# Patient Record
Sex: FEMALE | Race: WHITE | Employment: UNEMPLOYED | URBAN - NONMETROPOLITAN AREA
[De-identification: names, ages, dates, MRNs, and addresses within clinical notes are randomized per-mention and may not be internally consistent; named-entity substitution may affect disease eponyms.]

---

## 2017-09-12 ENCOUNTER — HOSPITAL ENCOUNTER (INPATIENT)
Age: 47
LOS: 5 days | Discharge: HOME | DRG: 291 | End: 2017-09-17
Attending: EMERGENCY MEDICINE | Admitting: INTERNAL MEDICINE

## 2017-09-12 ENCOUNTER — APPOINTMENT (OUTPATIENT)
Dept: GENERAL RADIOLOGY | Age: 47
DRG: 291 | End: 2017-09-12

## 2017-09-12 DIAGNOSIS — R60.0 PEDAL EDEMA: ICD-10-CM

## 2017-09-12 DIAGNOSIS — E11.65 TYPE 2 DIABETES MELLITUS WITH HYPERGLYCEMIA, UNSPECIFIED LONG TERM INSULIN USE STATUS: ICD-10-CM

## 2017-09-12 DIAGNOSIS — J18.9 PNEUMONIA DUE TO ORGANISM: ICD-10-CM

## 2017-09-12 DIAGNOSIS — I50.9 CONGESTIVE HEART FAILURE, UNSPECIFIED CONGESTIVE HEART FAILURE CHRONICITY, UNSPECIFIED CONGESTIVE HEART FAILURE TYPE: Primary | ICD-10-CM

## 2017-09-12 DIAGNOSIS — E87.3 METABOLIC ALKALOSIS: ICD-10-CM

## 2017-09-12 LAB
ALBUMIN SERPL-MCNC: 2.8 G/DL (ref 3.5–5.2)
ALP BLD-CCNC: 441 U/L (ref 35–104)
ALT SERPL-CCNC: 54 U/L (ref 5–33)
ANION GAP SERPL CALCULATED.3IONS-SCNC: 13 MMOL/L (ref 7–19)
AST SERPL-CCNC: 36 U/L (ref 5–32)
BACTERIA: NEGATIVE /HPF
BASE EXCESS ARTERIAL: 5 MMOL/L (ref -2–2)
BASOPHILS ABSOLUTE: 0.1 K/UL (ref 0–0.2)
BASOPHILS RELATIVE PERCENT: 0.7 % (ref 0–1)
BILIRUB SERPL-MCNC: 0.4 MG/DL (ref 0.2–1.2)
BILIRUBIN URINE: NEGATIVE
BLOOD, URINE: ABNORMAL
BUN BLDV-MCNC: 9 MG/DL (ref 6–20)
CALCIUM SERPL-MCNC: 8.8 MG/DL (ref 8.6–10)
CARBOXYHEMOGLOBIN ARTERIAL: 3.7 % (ref 0–5)
CHLORIDE BLD-SCNC: 95 MMOL/L (ref 98–111)
CLARITY: CLEAR
CO2: 26 MMOL/L (ref 22–29)
COLOR: YELLOW
CREAT SERPL-MCNC: 0.5 MG/DL (ref 0.5–0.9)
EOSINOPHILS ABSOLUTE: 0.1 K/UL (ref 0–0.6)
EOSINOPHILS RELATIVE PERCENT: 0.7 % (ref 0–5)
EPITHELIAL CELLS, UA: 1 /HPF (ref 0–5)
GFR NON-AFRICAN AMERICAN: >60
GLUCOSE BLD-MCNC: 333 MG/DL (ref 74–109)
GLUCOSE URINE: >=1000 MG/DL
HCO3 ARTERIAL: 27.9 MMOL/L (ref 22–26)
HCT VFR BLD CALC: 46.7 % (ref 37–47)
HEMOGLOBIN, ART, EXTENDED: 15.4 G/DL (ref 12–16)
HEMOGLOBIN: 15.2 G/DL (ref 12–16)
HYALINE CASTS: 0 /HPF (ref 0–8)
INR BLD: 1.01 (ref 0.88–1.18)
KETONES, URINE: NEGATIVE MG/DL
LACTIC ACID: 1.6 MG/DL (ref 0.5–1.9)
LEUKOCYTE ESTERASE, URINE: NEGATIVE
LIPASE: 20 U/L (ref 13–60)
LYMPHOCYTES ABSOLUTE: 2.1 K/UL (ref 1.1–4.5)
LYMPHOCYTES RELATIVE PERCENT: 20.6 % (ref 20–40)
MCH RBC QN AUTO: 27.5 PG (ref 27–31)
MCHC RBC AUTO-ENTMCNC: 32.5 G/DL (ref 33–37)
MCV RBC AUTO: 84.6 FL (ref 81–99)
METHEMOGLOBIN ARTERIAL: 1 %
MONOCYTES ABSOLUTE: 1 K/UL (ref 0–0.9)
MONOCYTES RELATIVE PERCENT: 9.5 % (ref 0–10)
NEUTROPHILS ABSOLUTE: 6.9 K/UL (ref 1.5–7.5)
NEUTROPHILS RELATIVE PERCENT: 68 % (ref 50–65)
NITRITE, URINE: NEGATIVE
O2 CONTENT ARTERIAL: 20.2 ML/DL
O2 SAT, ARTERIAL: 93.2 %
O2 THERAPY: ABNORMAL
PCO2 ARTERIAL: 35 MMHG (ref 35–45)
PDW BLD-RTO: 14.4 % (ref 11.5–14.5)
PERFORMED ON: NORMAL
PH ARTERIAL: 7.51 (ref 7.35–7.45)
PH UA: 7.5
PLATELET # BLD: 378 K/UL (ref 130–400)
PMV BLD AUTO: 9.4 FL (ref 9.4–12.3)
PO2 ARTERIAL: 81 MMHG (ref 80–100)
POC TROPONIN I: 0.01 NG/ML (ref 0–0.08)
POTASSIUM SERPL-SCNC: 4 MMOL/L (ref 3.5–5)
POTASSIUM, WHOLE BLOOD: 3.8
PRO-BNP: 5489 PG/ML (ref 0–450)
PROTEIN UA: 30 MG/DL
PROTHROMBIN TIME: 13.2 SEC (ref 12–14.6)
RBC # BLD: 5.52 M/UL (ref 4.2–5.4)
RBC UA: 10 /HPF (ref 0–4)
SODIUM BLD-SCNC: 134 MMOL/L (ref 136–145)
SPECIFIC GRAVITY UA: 1.01
TOTAL PROTEIN: 7.2 G/DL (ref 6.6–8.7)
TSH SERPL DL<=0.05 MIU/L-ACNC: 2.88 UIU/ML (ref 0.27–4.2)
UROBILINOGEN, URINE: 1 E.U./DL
WBC # BLD: 10.2 K/UL (ref 4.8–10.8)
WBC UA: 0 /HPF (ref 0–5)

## 2017-09-12 PROCEDURE — 99284 EMERGENCY DEPT VISIT MOD MDM: CPT | Performed by: EMERGENCY MEDICINE

## 2017-09-12 PROCEDURE — 82803 BLOOD GASES ANY COMBINATION: CPT

## 2017-09-12 PROCEDURE — 36415 COLL VENOUS BLD VENIPUNCTURE: CPT

## 2017-09-12 PROCEDURE — 99285 EMERGENCY DEPT VISIT HI MDM: CPT

## 2017-09-12 PROCEDURE — 85025 COMPLETE CBC W/AUTO DIFF WBC: CPT

## 2017-09-12 PROCEDURE — 80053 COMPREHEN METABOLIC PANEL: CPT

## 2017-09-12 PROCEDURE — 84443 ASSAY THYROID STIM HORMONE: CPT

## 2017-09-12 PROCEDURE — 83690 ASSAY OF LIPASE: CPT

## 2017-09-12 PROCEDURE — 81001 URINALYSIS AUTO W/SCOPE: CPT

## 2017-09-12 PROCEDURE — 2140000000 HC CCU INTERMEDIATE R&B

## 2017-09-12 PROCEDURE — 93005 ELECTROCARDIOGRAM TRACING: CPT

## 2017-09-12 PROCEDURE — 83605 ASSAY OF LACTIC ACID: CPT

## 2017-09-12 PROCEDURE — 71020 XR CHEST STANDARD TWO VW: CPT

## 2017-09-12 PROCEDURE — 36600 WITHDRAWAL OF ARTERIAL BLOOD: CPT

## 2017-09-12 PROCEDURE — 84132 ASSAY OF SERUM POTASSIUM: CPT

## 2017-09-12 PROCEDURE — 6360000002 HC RX W HCPCS: Performed by: EMERGENCY MEDICINE

## 2017-09-12 PROCEDURE — 87040 BLOOD CULTURE FOR BACTERIA: CPT

## 2017-09-12 PROCEDURE — 85610 PROTHROMBIN TIME: CPT

## 2017-09-12 PROCEDURE — 84484 ASSAY OF TROPONIN QUANT: CPT

## 2017-09-12 PROCEDURE — 83880 ASSAY OF NATRIURETIC PEPTIDE: CPT

## 2017-09-12 RX ORDER — FUROSEMIDE 10 MG/ML
80 INJECTION INTRAMUSCULAR; INTRAVENOUS ONCE
Status: COMPLETED | OUTPATIENT
Start: 2017-09-12 | End: 2017-09-12

## 2017-09-12 RX ORDER — FUROSEMIDE 40 MG/1
40 TABLET ORAL 2 TIMES DAILY
Status: ON HOLD | COMMUNITY
End: 2017-09-17 | Stop reason: HOSPADM

## 2017-09-12 RX ORDER — POTASSIUM CHLORIDE 1.5 G/1.77G
20 POWDER, FOR SOLUTION ORAL 2 TIMES DAILY
Status: ON HOLD | COMMUNITY
End: 2017-09-17

## 2017-09-12 RX ORDER — ENALAPRIL MALEATE 20 MG/1
20 TABLET ORAL 2 TIMES DAILY
COMMUNITY

## 2017-09-12 RX ORDER — SERTRALINE HYDROCHLORIDE 100 MG/1
100 TABLET, FILM COATED ORAL DAILY
COMMUNITY

## 2017-09-12 RX ORDER — SUCRALFATE 1 G/1
1 TABLET ORAL 4 TIMES DAILY
COMMUNITY

## 2017-09-12 RX ORDER — AMLODIPINE BESYLATE 5 MG/1
5 TABLET ORAL DAILY
Status: ON HOLD | COMMUNITY
End: 2017-09-17

## 2017-09-12 RX ORDER — ASPIRIN 81 MG/1
81 TABLET, CHEWABLE ORAL DAILY
COMMUNITY

## 2017-09-12 RX ORDER — LEVOTHYROXINE SODIUM 0.15 MG/1
150 TABLET ORAL DAILY
COMMUNITY

## 2017-09-12 RX ORDER — HYDROCHLOROTHIAZIDE 25 MG/1
25 TABLET ORAL DAILY
Status: ON HOLD | COMMUNITY
End: 2017-09-17 | Stop reason: HOSPADM

## 2017-09-12 RX ORDER — OMEPRAZOLE 20 MG/1
20 CAPSULE, DELAYED RELEASE ORAL 2 TIMES DAILY
COMMUNITY

## 2017-09-12 RX ORDER — METOPROLOL TARTRATE 100 MG/1
100 TABLET ORAL 2 TIMES DAILY
Status: ON HOLD | COMMUNITY
End: 2017-09-17

## 2017-09-12 RX ADMIN — TAZOBACTAM SODIUM AND PIPERACILLIN SODIUM 3.38 G: 375; 3 INJECTION, SOLUTION INTRAVENOUS at 22:19

## 2017-09-12 ASSESSMENT — ENCOUNTER SYMPTOMS
SHORTNESS OF BREATH: 1
VOMITING: 0
NAUSEA: 0
BACK PAIN: 0
DIARRHEA: 0
CHOKING: 0
COUGH: 0
ABDOMINAL PAIN: 1

## 2017-09-13 ENCOUNTER — APPOINTMENT (OUTPATIENT)
Dept: ULTRASOUND IMAGING | Age: 47
DRG: 291 | End: 2017-09-13

## 2017-09-13 PROBLEM — R60.1 GENERALIZED EDEMA: Status: ACTIVE | Noted: 2017-09-13

## 2017-09-13 PROBLEM — R74.01 TRANSAMINITIS: Status: ACTIVE | Noted: 2017-09-13

## 2017-09-13 PROBLEM — E88.09 HYPOALBUMINEMIA: Status: ACTIVE | Noted: 2017-09-13

## 2017-09-13 PROBLEM — J18.9 PNEUMONIA OF RIGHT LUNG DUE TO INFECTIOUS ORGANISM: Status: ACTIVE | Noted: 2017-09-13

## 2017-09-13 PROBLEM — I10 ESSENTIAL HYPERTENSION: Status: ACTIVE | Noted: 2017-09-13

## 2017-09-13 PROBLEM — K76.89 HEPATOCELLULAR DYSFUNCTION: Status: ACTIVE | Noted: 2017-09-13

## 2017-09-13 LAB
ANION GAP SERPL CALCULATED.3IONS-SCNC: 11 MMOL/L (ref 7–19)
BUN BLDV-MCNC: 7 MG/DL (ref 6–20)
CALCIUM SERPL-MCNC: 8.4 MG/DL (ref 8.6–10)
CHLORIDE BLD-SCNC: 94 MMOL/L (ref 98–111)
CO2: 30 MMOL/L (ref 22–29)
CREAT SERPL-MCNC: 0.4 MG/DL (ref 0.5–0.9)
GFR NON-AFRICAN AMERICAN: >60
GLUCOSE BLD-MCNC: 189 MG/DL (ref 70–99)
GLUCOSE BLD-MCNC: 262 MG/DL (ref 70–99)
GLUCOSE BLD-MCNC: 298 MG/DL (ref 70–99)
GLUCOSE BLD-MCNC: 341 MG/DL (ref 70–99)
GLUCOSE BLD-MCNC: 361 MG/DL (ref 74–109)
LV EF: 23 %
LVEF MODALITY: NORMAL
MAGNESIUM: 1.6 MG/DL (ref 1.6–2.6)
PERFORMED ON: ABNORMAL
POTASSIUM SERPL-SCNC: 3.6 MMOL/L (ref 3.5–5)
SODIUM BLD-SCNC: 135 MMOL/L (ref 136–145)
TROPONIN: <0.01 NG/ML (ref 0–0.03)
TROPONIN: <0.01 NG/ML (ref 0–0.03)
TSH SERPL DL<=0.05 MIU/L-ACNC: 2.44 UIU/ML (ref 0.27–4.2)

## 2017-09-13 PROCEDURE — 2140000000 HC CCU INTERMEDIATE R&B

## 2017-09-13 PROCEDURE — 93306 TTE W/DOPPLER COMPLETE: CPT

## 2017-09-13 PROCEDURE — 76705 ECHO EXAM OF ABDOMEN: CPT

## 2017-09-13 PROCEDURE — 84145 PROCALCITONIN (PCT): CPT

## 2017-09-13 PROCEDURE — 83735 ASSAY OF MAGNESIUM: CPT

## 2017-09-13 PROCEDURE — 82948 REAGENT STRIP/BLOOD GLUCOSE: CPT

## 2017-09-13 PROCEDURE — 84484 ASSAY OF TROPONIN QUANT: CPT

## 2017-09-13 PROCEDURE — 83036 HEMOGLOBIN GLYCOSYLATED A1C: CPT

## 2017-09-13 PROCEDURE — 84443 ASSAY THYROID STIM HORMONE: CPT

## 2017-09-13 PROCEDURE — 99223 1ST HOSP IP/OBS HIGH 75: CPT | Performed by: INTERNAL MEDICINE

## 2017-09-13 PROCEDURE — 80048 BASIC METABOLIC PNL TOTAL CA: CPT

## 2017-09-13 PROCEDURE — 6370000000 HC RX 637 (ALT 250 FOR IP): Performed by: INTERNAL MEDICINE

## 2017-09-13 PROCEDURE — 6360000002 HC RX W HCPCS: Performed by: INTERNAL MEDICINE

## 2017-09-13 PROCEDURE — 36415 COLL VENOUS BLD VENIPUNCTURE: CPT

## 2017-09-13 PROCEDURE — 99232 SBSQ HOSP IP/OBS MODERATE 35: CPT | Performed by: INTERNAL MEDICINE

## 2017-09-13 PROCEDURE — 2580000003 HC RX 258: Performed by: INTERNAL MEDICINE

## 2017-09-13 RX ORDER — ASPIRIN 81 MG/1
81 TABLET ORAL DAILY
Status: DISCONTINUED | OUTPATIENT
Start: 2017-09-13 | End: 2017-09-17 | Stop reason: HOSPADM

## 2017-09-13 RX ORDER — POTASSIUM CHLORIDE 20 MEQ/1
40 TABLET, EXTENDED RELEASE ORAL PRN
Status: DISCONTINUED | OUTPATIENT
Start: 2017-09-13 | End: 2017-09-17 | Stop reason: HOSPADM

## 2017-09-13 RX ORDER — SERTRALINE HYDROCHLORIDE 100 MG/1
100 TABLET, FILM COATED ORAL DAILY
Status: DISCONTINUED | OUTPATIENT
Start: 2017-09-13 | End: 2017-09-17 | Stop reason: HOSPADM

## 2017-09-13 RX ORDER — OMEPRAZOLE 20 MG/1
20 CAPSULE, DELAYED RELEASE ORAL
Status: DISCONTINUED | OUTPATIENT
Start: 2017-09-13 | End: 2017-09-17 | Stop reason: HOSPADM

## 2017-09-13 RX ORDER — MAGNESIUM SULFATE 1 G/100ML
1 INJECTION INTRAVENOUS PRN
Status: DISCONTINUED | OUTPATIENT
Start: 2017-09-13 | End: 2017-09-17 | Stop reason: HOSPADM

## 2017-09-13 RX ORDER — SODIUM CHLORIDE 0.9 % (FLUSH) 0.9 %
10 SYRINGE (ML) INJECTION PRN
Status: DISCONTINUED | OUTPATIENT
Start: 2017-09-13 | End: 2017-09-17 | Stop reason: HOSPADM

## 2017-09-13 RX ORDER — PANTOPRAZOLE SODIUM 40 MG/1
40 TABLET, DELAYED RELEASE ORAL
Status: DISCONTINUED | OUTPATIENT
Start: 2017-09-13 | End: 2017-09-13

## 2017-09-13 RX ORDER — SODIUM CHLORIDE 0.9 % (FLUSH) 0.9 %
10 SYRINGE (ML) INJECTION EVERY 12 HOURS SCHEDULED
Status: DISCONTINUED | OUTPATIENT
Start: 2017-09-13 | End: 2017-09-17 | Stop reason: HOSPADM

## 2017-09-13 RX ORDER — ACETAMINOPHEN 325 MG/1
650 TABLET ORAL EVERY 4 HOURS PRN
Status: DISCONTINUED | OUTPATIENT
Start: 2017-09-13 | End: 2017-09-17 | Stop reason: HOSPADM

## 2017-09-13 RX ORDER — ONDANSETRON 2 MG/ML
4 INJECTION INTRAMUSCULAR; INTRAVENOUS EVERY 6 HOURS PRN
Status: DISCONTINUED | OUTPATIENT
Start: 2017-09-13 | End: 2017-09-17 | Stop reason: HOSPADM

## 2017-09-13 RX ORDER — POTASSIUM CHLORIDE 20MEQ/15ML
40 LIQUID (ML) ORAL PRN
Status: DISCONTINUED | OUTPATIENT
Start: 2017-09-13 | End: 2017-09-17 | Stop reason: HOSPADM

## 2017-09-13 RX ORDER — DEXTROSE MONOHYDRATE 25 G/50ML
12.5 INJECTION, SOLUTION INTRAVENOUS PRN
Status: DISCONTINUED | OUTPATIENT
Start: 2017-09-13 | End: 2017-09-17 | Stop reason: HOSPADM

## 2017-09-13 RX ORDER — SUCRALFATE 1 G/1
1 TABLET ORAL 4 TIMES DAILY
Status: DISCONTINUED | OUTPATIENT
Start: 2017-09-13 | End: 2017-09-17 | Stop reason: HOSPADM

## 2017-09-13 RX ORDER — FUROSEMIDE 10 MG/ML
40 INJECTION INTRAMUSCULAR; INTRAVENOUS DAILY
Status: DISCONTINUED | OUTPATIENT
Start: 2017-09-13 | End: 2017-09-16

## 2017-09-13 RX ORDER — LEVOTHYROXINE SODIUM 0.15 MG/1
150 TABLET ORAL DAILY
Status: DISCONTINUED | OUTPATIENT
Start: 2017-09-13 | End: 2017-09-17 | Stop reason: HOSPADM

## 2017-09-13 RX ORDER — NICOTINE POLACRILEX 4 MG
15 LOZENGE BUCCAL PRN
Status: DISCONTINUED | OUTPATIENT
Start: 2017-09-13 | End: 2017-09-17 | Stop reason: HOSPADM

## 2017-09-13 RX ORDER — AMLODIPINE BESYLATE 5 MG/1
5 TABLET ORAL DAILY
Status: DISCONTINUED | OUTPATIENT
Start: 2017-09-13 | End: 2017-09-17

## 2017-09-13 RX ORDER — POTASSIUM CHLORIDE 7.45 MG/ML
10 INJECTION INTRAVENOUS PRN
Status: DISCONTINUED | OUTPATIENT
Start: 2017-09-13 | End: 2017-09-17 | Stop reason: HOSPADM

## 2017-09-13 RX ORDER — METOPROLOL TARTRATE 100 MG/1
100 TABLET ORAL 2 TIMES DAILY
Status: DISCONTINUED | OUTPATIENT
Start: 2017-09-13 | End: 2017-09-14

## 2017-09-13 RX ORDER — DEXTROSE MONOHYDRATE 50 MG/ML
100 INJECTION, SOLUTION INTRAVENOUS PRN
Status: DISCONTINUED | OUTPATIENT
Start: 2017-09-13 | End: 2017-09-17 | Stop reason: HOSPADM

## 2017-09-13 RX ORDER — ENALAPRIL MALEATE 20 MG/1
20 TABLET ORAL 2 TIMES DAILY
Status: DISCONTINUED | OUTPATIENT
Start: 2017-09-13 | End: 2017-09-17 | Stop reason: HOSPADM

## 2017-09-13 RX ADMIN — AMLODIPINE BESYLATE 5 MG: 5 TABLET ORAL at 09:27

## 2017-09-13 RX ADMIN — ACETAMINOPHEN 650 MG: 325 TABLET, FILM COATED ORAL at 06:27

## 2017-09-13 RX ADMIN — METOPROLOL TARTRATE 100 MG: 100 TABLET ORAL at 09:25

## 2017-09-13 RX ADMIN — ENALAPRIL MALEATE 20 MG: 20 TABLET ORAL at 20:45

## 2017-09-13 RX ADMIN — INSULIN LISPRO 3 UNITS: 100 INJECTION, SOLUTION INTRAVENOUS; SUBCUTANEOUS at 21:58

## 2017-09-13 RX ADMIN — LEVOTHYROXINE SODIUM 150 MCG: 0.15 TABLET ORAL at 06:27

## 2017-09-13 RX ADMIN — SUCRALFATE 1 G: 1 TABLET ORAL at 09:26

## 2017-09-13 RX ADMIN — FUROSEMIDE 40 MG: 10 INJECTION, SOLUTION INTRAMUSCULAR; INTRAVENOUS at 09:28

## 2017-09-13 RX ADMIN — TAZOBACTAM SODIUM AND PIPERACILLIN SODIUM 3.38 G: 375; 3 INJECTION, SOLUTION INTRAVENOUS at 17:58

## 2017-09-13 RX ADMIN — ACETAMINOPHEN 650 MG: 325 TABLET, FILM COATED ORAL at 20:44

## 2017-09-13 RX ADMIN — Medication 10 ML: at 09:28

## 2017-09-13 RX ADMIN — TAZOBACTAM SODIUM AND PIPERACILLIN SODIUM 3.38 G: 375; 3 INJECTION, SOLUTION INTRAVENOUS at 09:27

## 2017-09-13 RX ADMIN — ASPIRIN 81 MG: 81 TABLET ORAL at 09:26

## 2017-09-13 RX ADMIN — INSULIN LISPRO 8 UNITS: 100 INJECTION, SOLUTION INTRAVENOUS; SUBCUTANEOUS at 09:29

## 2017-09-13 RX ADMIN — SERTRALINE HYDROCHLORIDE 100 MG: 100 TABLET, FILM COATED ORAL at 09:27

## 2017-09-13 RX ADMIN — SUCRALFATE 1 G: 1 TABLET ORAL at 12:51

## 2017-09-13 RX ADMIN — OMEPRAZOLE 20 MG: 20 CAPSULE, DELAYED RELEASE ORAL at 17:58

## 2017-09-13 RX ADMIN — METOPROLOL TARTRATE 100 MG: 100 TABLET ORAL at 20:45

## 2017-09-13 RX ADMIN — ENALAPRIL MALEATE 20 MG: 20 TABLET ORAL at 09:26

## 2017-09-13 RX ADMIN — INSULIN LISPRO 25 UNITS: 100 INJECTION, SUSPENSION SUBCUTANEOUS at 20:41

## 2017-09-13 RX ADMIN — INSULIN LISPRO 2 UNITS: 100 INJECTION, SOLUTION INTRAVENOUS; SUBCUTANEOUS at 17:59

## 2017-09-13 RX ADMIN — Medication 10 ML: at 20:48

## 2017-09-13 RX ADMIN — SUCRALFATE 1 G: 1 TABLET ORAL at 17:59

## 2017-09-13 RX ADMIN — SUCRALFATE 1 G: 1 TABLET ORAL at 20:45

## 2017-09-13 RX ADMIN — ENOXAPARIN SODIUM 40 MG: 40 INJECTION SUBCUTANEOUS at 09:27

## 2017-09-13 RX ADMIN — INSULIN LISPRO 6 UNITS: 100 INJECTION, SOLUTION INTRAVENOUS; SUBCUTANEOUS at 12:51

## 2017-09-13 RX ADMIN — OMEPRAZOLE 20 MG: 20 CAPSULE, DELAYED RELEASE ORAL at 06:27

## 2017-09-13 ASSESSMENT — PAIN SCALES - GENERAL
PAINLEVEL_OUTOF10: 0
PAINLEVEL_OUTOF10: 0
PAINLEVEL_OUTOF10: 5
PAINLEVEL_OUTOF10: 0
PAINLEVEL_OUTOF10: 5
PAINLEVEL_OUTOF10: 0

## 2017-09-13 ASSESSMENT — ENCOUNTER SYMPTOMS
ORTHOPNEA: 1
ABDOMINAL PAIN: 0
DIARRHEA: 0
VOMITING: 0
PHOTOPHOBIA: 0
BACK PAIN: 0
BLURRED VISION: 0
NAUSEA: 0
EYE PAIN: 0
HEARTBURN: 0
DOUBLE VISION: 0
SHORTNESS OF BREATH: 1

## 2017-09-14 PROBLEM — I50.23 ACUTE ON CHRONIC SYSTOLIC CONGESTIVE HEART FAILURE (HCC): Status: ACTIVE | Noted: 2017-09-14

## 2017-09-14 LAB
ANION GAP SERPL CALCULATED.3IONS-SCNC: 11 MMOL/L (ref 7–19)
BUN BLDV-MCNC: 12 MG/DL (ref 6–20)
CALCIUM SERPL-MCNC: 8.5 MG/DL (ref 8.6–10)
CHLORIDE BLD-SCNC: 100 MMOL/L (ref 98–111)
CO2: 31 MMOL/L (ref 22–29)
CREAT SERPL-MCNC: 0.5 MG/DL (ref 0.5–0.9)
GFR NON-AFRICAN AMERICAN: >60
GLUCOSE BLD-MCNC: 108 MG/DL (ref 70–99)
GLUCOSE BLD-MCNC: 142 MG/DL (ref 70–99)
GLUCOSE BLD-MCNC: 160 MG/DL (ref 74–109)
GLUCOSE BLD-MCNC: 182 MG/DL (ref 70–99)
GLUCOSE BLD-MCNC: 242 MG/DL (ref 70–99)
PERFORMED ON: ABNORMAL
POTASSIUM SERPL-SCNC: 3.7 MMOL/L (ref 3.5–5)
PROCALCITONIN: <0.07 NG/ML
PROCALCITONIN: <0.07 NG/ML
SODIUM BLD-SCNC: 142 MMOL/L (ref 136–145)

## 2017-09-14 PROCEDURE — 82948 REAGENT STRIP/BLOOD GLUCOSE: CPT

## 2017-09-14 PROCEDURE — 6360000002 HC RX W HCPCS: Performed by: INTERNAL MEDICINE

## 2017-09-14 PROCEDURE — 80048 BASIC METABOLIC PNL TOTAL CA: CPT

## 2017-09-14 PROCEDURE — 36415 COLL VENOUS BLD VENIPUNCTURE: CPT

## 2017-09-14 PROCEDURE — 2580000003 HC RX 258: Performed by: INTERNAL MEDICINE

## 2017-09-14 PROCEDURE — 2140000000 HC CCU INTERMEDIATE R&B

## 2017-09-14 PROCEDURE — 2500000003 HC RX 250 WO HCPCS: Performed by: INTERNAL MEDICINE

## 2017-09-14 PROCEDURE — 99232 SBSQ HOSP IP/OBS MODERATE 35: CPT | Performed by: INTERNAL MEDICINE

## 2017-09-14 PROCEDURE — 6370000000 HC RX 637 (ALT 250 FOR IP): Performed by: INTERNAL MEDICINE

## 2017-09-14 RX ORDER — METOPROLOL TARTRATE 100 MG/1
50 TABLET ORAL 2 TIMES DAILY
Status: DISCONTINUED | OUTPATIENT
Start: 2017-09-14 | End: 2017-09-15

## 2017-09-14 RX ORDER — METOPROLOL TARTRATE 50 MG/1
50 TABLET, FILM COATED ORAL 2 TIMES DAILY
Status: DISCONTINUED | OUTPATIENT
Start: 2017-09-14 | End: 2017-09-14

## 2017-09-14 RX ADMIN — TAZOBACTAM SODIUM AND PIPERACILLIN SODIUM 3.38 G: 375; 3 INJECTION, SOLUTION INTRAVENOUS at 01:59

## 2017-09-14 RX ADMIN — SUCRALFATE 1 G: 1 TABLET ORAL at 17:25

## 2017-09-14 RX ADMIN — INSULIN LISPRO 25 UNITS: 100 INJECTION, SUSPENSION SUBCUTANEOUS at 08:19

## 2017-09-14 RX ADMIN — METOPROLOL TARTRATE 50 MG: 100 TABLET ORAL at 22:43

## 2017-09-14 RX ADMIN — METOPROLOL TARTRATE 100 MG: 100 TABLET ORAL at 08:19

## 2017-09-14 RX ADMIN — ENOXAPARIN SODIUM 40 MG: 40 INJECTION SUBCUTANEOUS at 08:34

## 2017-09-14 RX ADMIN — FUROSEMIDE 40 MG: 10 INJECTION, SOLUTION INTRAMUSCULAR; INTRAVENOUS at 08:35

## 2017-09-14 RX ADMIN — LEVOTHYROXINE SODIUM 150 MCG: 0.15 TABLET ORAL at 05:35

## 2017-09-14 RX ADMIN — INSULIN LISPRO 2 UNITS: 100 INJECTION, SOLUTION INTRAVENOUS; SUBCUTANEOUS at 12:51

## 2017-09-14 RX ADMIN — TAZOBACTAM SODIUM AND PIPERACILLIN SODIUM 3.38 G: 375; 3 INJECTION, SOLUTION INTRAVENOUS at 08:18

## 2017-09-14 RX ADMIN — SUCRALFATE 1 G: 1 TABLET ORAL at 22:43

## 2017-09-14 RX ADMIN — INSULIN LISPRO 6 UNITS: 100 INJECTION, SOLUTION INTRAVENOUS; SUBCUTANEOUS at 17:27

## 2017-09-14 RX ADMIN — ENALAPRIL MALEATE 20 MG: 20 TABLET ORAL at 22:43

## 2017-09-14 RX ADMIN — Medication 10 ML: at 22:43

## 2017-09-14 RX ADMIN — TAZOBACTAM SODIUM AND PIPERACILLIN SODIUM 3.38 G: 375; 3 INJECTION, SOLUTION INTRAVENOUS at 17:25

## 2017-09-14 RX ADMIN — INSULIN LISPRO 25 UNITS: 100 INJECTION, SUSPENSION SUBCUTANEOUS at 17:26

## 2017-09-14 RX ADMIN — Medication 10 ML: at 08:18

## 2017-09-14 RX ADMIN — SUCRALFATE 1 G: 1 TABLET ORAL at 12:52

## 2017-09-14 RX ADMIN — AMLODIPINE BESYLATE 5 MG: 5 TABLET ORAL at 08:19

## 2017-09-14 RX ADMIN — OMEPRAZOLE 20 MG: 20 CAPSULE, DELAYED RELEASE ORAL at 05:35

## 2017-09-14 RX ADMIN — ASPIRIN 81 MG: 81 TABLET ORAL at 08:19

## 2017-09-14 RX ADMIN — SUCRALFATE 1 G: 1 TABLET ORAL at 08:19

## 2017-09-14 RX ADMIN — ENALAPRIL MALEATE 20 MG: 20 TABLET ORAL at 08:19

## 2017-09-14 RX ADMIN — DOXYCYCLINE 100 MG: 100 INJECTION, POWDER, LYOPHILIZED, FOR SOLUTION INTRAVENOUS at 23:27

## 2017-09-14 RX ADMIN — ACETAMINOPHEN 650 MG: 325 TABLET, FILM COATED ORAL at 12:55

## 2017-09-14 RX ADMIN — OMEPRAZOLE 20 MG: 20 CAPSULE, DELAYED RELEASE ORAL at 17:25

## 2017-09-14 RX ADMIN — SERTRALINE HYDROCHLORIDE 100 MG: 100 TABLET, FILM COATED ORAL at 08:19

## 2017-09-14 ASSESSMENT — PAIN SCALES - GENERAL
PAINLEVEL_OUTOF10: 0
PAINLEVEL_OUTOF10: 5
PAINLEVEL_OUTOF10: 0

## 2017-09-15 ENCOUNTER — APPOINTMENT (OUTPATIENT)
Dept: GENERAL RADIOLOGY | Age: 47
DRG: 291 | End: 2017-09-15

## 2017-09-15 LAB
ALBUMIN SERPL-MCNC: 2.4 G/DL (ref 3.5–5.2)
ALP BLD-CCNC: 305 U/L (ref 35–104)
ALT SERPL-CCNC: 29 U/L (ref 5–33)
ANION GAP SERPL CALCULATED.3IONS-SCNC: 11 MMOL/L (ref 7–19)
AST SERPL-CCNC: 20 U/L (ref 5–32)
BILIRUB SERPL-MCNC: 0.3 MG/DL (ref 0.2–1.2)
BILIRUBIN DIRECT: 0.1 MG/DL (ref 0–0.3)
BILIRUBIN, INDIRECT: 0.2 MG/DL (ref 0.1–1)
BUN BLDV-MCNC: 12 MG/DL (ref 6–20)
CALCIUM SERPL-MCNC: 8.6 MG/DL (ref 8.6–10)
CHLORIDE BLD-SCNC: 98 MMOL/L (ref 98–111)
CO2: 30 MMOL/L (ref 22–29)
CREAT SERPL-MCNC: 0.4 MG/DL (ref 0.5–0.9)
GFR NON-AFRICAN AMERICAN: >60
GLUCOSE BLD-MCNC: 127 MG/DL (ref 70–99)
GLUCOSE BLD-MCNC: 152 MG/DL (ref 70–99)
GLUCOSE BLD-MCNC: 153 MG/DL (ref 74–109)
GLUCOSE BLD-MCNC: 189 MG/DL (ref 70–99)
GLUCOSE BLD-MCNC: 206 MG/DL (ref 70–99)
MAGNESIUM: 1.9 MG/DL (ref 1.6–2.6)
PERFORMED ON: ABNORMAL
POTASSIUM SERPL-SCNC: 3.8 MMOL/L (ref 3.5–5)
SODIUM BLD-SCNC: 139 MMOL/L (ref 136–145)
TOTAL PROTEIN: 5.9 G/DL (ref 6.6–8.7)

## 2017-09-15 PROCEDURE — 6360000002 HC RX W HCPCS: Performed by: INTERNAL MEDICINE

## 2017-09-15 PROCEDURE — 99232 SBSQ HOSP IP/OBS MODERATE 35: CPT | Performed by: INTERNAL MEDICINE

## 2017-09-15 PROCEDURE — 2140000000 HC CCU INTERMEDIATE R&B

## 2017-09-15 PROCEDURE — 6370000000 HC RX 637 (ALT 250 FOR IP): Performed by: INTERNAL MEDICINE

## 2017-09-15 PROCEDURE — 2500000003 HC RX 250 WO HCPCS: Performed by: INTERNAL MEDICINE

## 2017-09-15 PROCEDURE — 83735 ASSAY OF MAGNESIUM: CPT

## 2017-09-15 PROCEDURE — 71020 XR CHEST STANDARD TWO VW: CPT

## 2017-09-15 PROCEDURE — 80076 HEPATIC FUNCTION PANEL: CPT

## 2017-09-15 PROCEDURE — 36415 COLL VENOUS BLD VENIPUNCTURE: CPT

## 2017-09-15 PROCEDURE — 2580000003 HC RX 258: Performed by: INTERNAL MEDICINE

## 2017-09-15 PROCEDURE — 80048 BASIC METABOLIC PNL TOTAL CA: CPT

## 2017-09-15 PROCEDURE — 82948 REAGENT STRIP/BLOOD GLUCOSE: CPT

## 2017-09-15 RX ORDER — SPIRONOLACTONE 25 MG/1
25 TABLET ORAL DAILY
Status: DISCONTINUED | OUTPATIENT
Start: 2017-09-15 | End: 2017-09-17 | Stop reason: HOSPADM

## 2017-09-15 RX ORDER — METOPROLOL TARTRATE 100 MG/1
100 TABLET ORAL 2 TIMES DAILY
Status: DISCONTINUED | OUTPATIENT
Start: 2017-09-15 | End: 2017-09-17 | Stop reason: HOSPADM

## 2017-09-15 RX ADMIN — ENALAPRIL MALEATE 20 MG: 20 TABLET ORAL at 08:11

## 2017-09-15 RX ADMIN — FUROSEMIDE 40 MG: 10 INJECTION, SOLUTION INTRAMUSCULAR; INTRAVENOUS at 08:13

## 2017-09-15 RX ADMIN — SUCRALFATE 1 G: 1 TABLET ORAL at 16:55

## 2017-09-15 RX ADMIN — DOXYCYCLINE 100 MG: 100 INJECTION, POWDER, LYOPHILIZED, FOR SOLUTION INTRAVENOUS at 09:46

## 2017-09-15 RX ADMIN — CEFTRIAXONE SODIUM 1 G: 1 INJECTION, POWDER, FOR SOLUTION INTRAMUSCULAR; INTRAVENOUS at 13:07

## 2017-09-15 RX ADMIN — INSULIN LISPRO 25 UNITS: 100 INJECTION, SUSPENSION SUBCUTANEOUS at 16:56

## 2017-09-15 RX ADMIN — SUCRALFATE 1 G: 1 TABLET ORAL at 12:13

## 2017-09-15 RX ADMIN — METOPROLOL TARTRATE 50 MG: 100 TABLET ORAL at 08:08

## 2017-09-15 RX ADMIN — SUCRALFATE 1 G: 1 TABLET ORAL at 20:14

## 2017-09-15 RX ADMIN — ENALAPRIL MALEATE 20 MG: 20 TABLET ORAL at 20:14

## 2017-09-15 RX ADMIN — Medication 10 ML: at 20:14

## 2017-09-15 RX ADMIN — SPIRONOLACTONE 25 MG: 25 TABLET, FILM COATED ORAL at 16:55

## 2017-09-15 RX ADMIN — DOXYCYCLINE 100 MG: 100 INJECTION, POWDER, LYOPHILIZED, FOR SOLUTION INTRAVENOUS at 20:14

## 2017-09-15 RX ADMIN — SERTRALINE HYDROCHLORIDE 100 MG: 100 TABLET, FILM COATED ORAL at 08:08

## 2017-09-15 RX ADMIN — AMLODIPINE BESYLATE 5 MG: 5 TABLET ORAL at 08:08

## 2017-09-15 RX ADMIN — SUCRALFATE 1 G: 1 TABLET ORAL at 08:11

## 2017-09-15 RX ADMIN — METOPROLOL TARTRATE 100 MG: 100 TABLET ORAL at 20:14

## 2017-09-15 RX ADMIN — OMEPRAZOLE 20 MG: 20 CAPSULE, DELAYED RELEASE ORAL at 06:10

## 2017-09-15 RX ADMIN — INSULIN LISPRO 25 UNITS: 100 INJECTION, SUSPENSION SUBCUTANEOUS at 08:32

## 2017-09-15 RX ADMIN — ACETAMINOPHEN 650 MG: 325 TABLET, FILM COATED ORAL at 02:05

## 2017-09-15 RX ADMIN — ASPIRIN 81 MG: 81 TABLET ORAL at 08:09

## 2017-09-15 RX ADMIN — ENOXAPARIN SODIUM 40 MG: 40 INJECTION SUBCUTANEOUS at 08:13

## 2017-09-15 RX ADMIN — INSULIN LISPRO 2 UNITS: 100 INJECTION, SOLUTION INTRAVENOUS; SUBCUTANEOUS at 12:14

## 2017-09-15 RX ADMIN — OMEPRAZOLE 20 MG: 20 CAPSULE, DELAYED RELEASE ORAL at 16:55

## 2017-09-15 RX ADMIN — LEVOTHYROXINE SODIUM 150 MCG: 0.15 TABLET ORAL at 06:10

## 2017-09-15 ASSESSMENT — PAIN SCALES - GENERAL
PAINLEVEL_OUTOF10: 1
PAINLEVEL_OUTOF10: 0
PAINLEVEL_OUTOF10: 0
PAINLEVEL_OUTOF10: 1
PAINLEVEL_OUTOF10: 3

## 2017-09-16 LAB
C DIFFICILE TOXIN, EIA: NORMAL
GLUCOSE BLD-MCNC: 114 MG/DL (ref 70–99)
GLUCOSE BLD-MCNC: 126 MG/DL (ref 70–99)
GLUCOSE BLD-MCNC: 145 MG/DL (ref 70–99)
GLUCOSE BLD-MCNC: 166 MG/DL (ref 70–99)
GLUCOSE BLD-MCNC: 271 MG/DL (ref 70–99)
GLUCOSE BLD-MCNC: 68 MG/DL (ref 70–99)
GLUCOSE BLD-MCNC: 90 MG/DL (ref 70–99)
MISCELLANEOUS LAB TEST ORDER: ABNORMAL
PERFORMED ON: ABNORMAL
PERFORMED ON: NORMAL

## 2017-09-16 PROCEDURE — 2580000003 HC RX 258: Performed by: INTERNAL MEDICINE

## 2017-09-16 PROCEDURE — 87324 CLOSTRIDIUM AG IA: CPT

## 2017-09-16 PROCEDURE — 99232 SBSQ HOSP IP/OBS MODERATE 35: CPT | Performed by: INTERNAL MEDICINE

## 2017-09-16 PROCEDURE — 6370000000 HC RX 637 (ALT 250 FOR IP): Performed by: INTERNAL MEDICINE

## 2017-09-16 PROCEDURE — 82948 REAGENT STRIP/BLOOD GLUCOSE: CPT

## 2017-09-16 PROCEDURE — 2500000003 HC RX 250 WO HCPCS: Performed by: INTERNAL MEDICINE

## 2017-09-16 PROCEDURE — 6360000002 HC RX W HCPCS: Performed by: INTERNAL MEDICINE

## 2017-09-16 PROCEDURE — 2140000000 HC CCU INTERMEDIATE R&B

## 2017-09-16 RX ORDER — HYDRALAZINE HYDROCHLORIDE 50 MG/1
50 TABLET, FILM COATED ORAL EVERY 12 HOURS SCHEDULED
Status: DISCONTINUED | OUTPATIENT
Start: 2017-09-16 | End: 2017-09-17 | Stop reason: HOSPADM

## 2017-09-16 RX ORDER — FUROSEMIDE 40 MG/1
40 TABLET ORAL DAILY
Status: DISCONTINUED | OUTPATIENT
Start: 2017-09-17 | End: 2017-09-17 | Stop reason: HOSPADM

## 2017-09-16 RX ORDER — HYDRALAZINE HYDROCHLORIDE 25 MG/1
25 TABLET, FILM COATED ORAL EVERY 8 HOURS SCHEDULED
Status: DISCONTINUED | OUTPATIENT
Start: 2017-09-16 | End: 2017-09-16

## 2017-09-16 RX ADMIN — SUCRALFATE 1 G: 1 TABLET ORAL at 11:43

## 2017-09-16 RX ADMIN — DOXYCYCLINE 100 MG: 100 INJECTION, POWDER, LYOPHILIZED, FOR SOLUTION INTRAVENOUS at 08:27

## 2017-09-16 RX ADMIN — ENALAPRIL MALEATE 20 MG: 20 TABLET ORAL at 20:11

## 2017-09-16 RX ADMIN — Medication 10 ML: at 07:52

## 2017-09-16 RX ADMIN — OMEPRAZOLE 20 MG: 20 CAPSULE, DELAYED RELEASE ORAL at 06:12

## 2017-09-16 RX ADMIN — SUCRALFATE 1 G: 1 TABLET ORAL at 07:51

## 2017-09-16 RX ADMIN — OMEPRAZOLE 20 MG: 20 CAPSULE, DELAYED RELEASE ORAL at 17:13

## 2017-09-16 RX ADMIN — SERTRALINE HYDROCHLORIDE 100 MG: 100 TABLET, FILM COATED ORAL at 07:50

## 2017-09-16 RX ADMIN — Medication 10 ML: at 20:13

## 2017-09-16 RX ADMIN — ENOXAPARIN SODIUM 40 MG: 40 INJECTION SUBCUTANEOUS at 07:52

## 2017-09-16 RX ADMIN — FUROSEMIDE 40 MG: 10 INJECTION, SOLUTION INTRAMUSCULAR; INTRAVENOUS at 07:51

## 2017-09-16 RX ADMIN — ACETAMINOPHEN 650 MG: 325 TABLET, FILM COATED ORAL at 07:48

## 2017-09-16 RX ADMIN — ENALAPRIL MALEATE 20 MG: 20 TABLET ORAL at 07:49

## 2017-09-16 RX ADMIN — ASPIRIN 81 MG: 81 TABLET ORAL at 07:49

## 2017-09-16 RX ADMIN — SUCRALFATE 1 G: 1 TABLET ORAL at 17:13

## 2017-09-16 RX ADMIN — SUCRALFATE 1 G: 1 TABLET ORAL at 20:10

## 2017-09-16 RX ADMIN — LEVOTHYROXINE SODIUM 150 MCG: 0.15 TABLET ORAL at 06:12

## 2017-09-16 RX ADMIN — INSULIN LISPRO 6 UNITS: 100 INJECTION, SOLUTION INTRAVENOUS; SUBCUTANEOUS at 11:53

## 2017-09-16 RX ADMIN — METOPROLOL TARTRATE 100 MG: 100 TABLET ORAL at 07:50

## 2017-09-16 RX ADMIN — CEFTRIAXONE SODIUM 1 G: 1 INJECTION, POWDER, FOR SOLUTION INTRAMUSCULAR; INTRAVENOUS at 11:42

## 2017-09-16 RX ADMIN — DOXYCYCLINE 100 MG: 100 INJECTION, POWDER, LYOPHILIZED, FOR SOLUTION INTRAVENOUS at 20:09

## 2017-09-16 RX ADMIN — METOPROLOL TARTRATE 100 MG: 100 TABLET ORAL at 20:10

## 2017-09-16 RX ADMIN — INSULIN LISPRO 25 UNITS: 100 INJECTION, SUSPENSION SUBCUTANEOUS at 17:38

## 2017-09-16 RX ADMIN — AMLODIPINE BESYLATE 5 MG: 5 TABLET ORAL at 07:49

## 2017-09-16 RX ADMIN — HYDRALAZINE HYDROCHLORIDE 50 MG: 50 TABLET, FILM COATED ORAL at 21:06

## 2017-09-16 RX ADMIN — INSULIN LISPRO 25 UNITS: 100 INJECTION, SUSPENSION SUBCUTANEOUS at 08:27

## 2017-09-16 RX ADMIN — SPIRONOLACTONE 25 MG: 25 TABLET, FILM COATED ORAL at 07:49

## 2017-09-16 ASSESSMENT — PAIN SCALES - GENERAL
PAINLEVEL_OUTOF10: 0
PAINLEVEL_OUTOF10: 4

## 2017-09-17 VITALS
SYSTOLIC BLOOD PRESSURE: 162 MMHG | HEART RATE: 53 BPM | HEIGHT: 61 IN | DIASTOLIC BLOOD PRESSURE: 83 MMHG | OXYGEN SATURATION: 99 % | BODY MASS INDEX: 39.8 KG/M2 | WEIGHT: 210.8 LBS | TEMPERATURE: 97.3 F | RESPIRATION RATE: 16 BRPM

## 2017-09-17 LAB
ANION GAP SERPL CALCULATED.3IONS-SCNC: 10 MMOL/L (ref 7–19)
BUN BLDV-MCNC: 11 MG/DL (ref 6–20)
CALCIUM SERPL-MCNC: 9 MG/DL (ref 8.6–10)
CHLORIDE BLD-SCNC: 99 MMOL/L (ref 98–111)
CO2: 28 MMOL/L (ref 22–29)
CREAT SERPL-MCNC: 0.4 MG/DL (ref 0.5–0.9)
GFR NON-AFRICAN AMERICAN: >60
GLUCOSE BLD-MCNC: 249 MG/DL (ref 70–99)
GLUCOSE BLD-MCNC: 95 MG/DL (ref 74–109)
GLUCOSE BLD-MCNC: 97 MG/DL (ref 70–99)
MAGNESIUM: 2 MG/DL (ref 1.6–2.6)
PERFORMED ON: ABNORMAL
PERFORMED ON: NORMAL
POTASSIUM SERPL-SCNC: 3.9 MMOL/L (ref 3.5–5)
SODIUM BLD-SCNC: 137 MMOL/L (ref 136–145)

## 2017-09-17 PROCEDURE — 2580000003 HC RX 258: Performed by: INTERNAL MEDICINE

## 2017-09-17 PROCEDURE — 6370000000 HC RX 637 (ALT 250 FOR IP): Performed by: INTERNAL MEDICINE

## 2017-09-17 PROCEDURE — 36415 COLL VENOUS BLD VENIPUNCTURE: CPT

## 2017-09-17 PROCEDURE — 99239 HOSP IP/OBS DSCHRG MGMT >30: CPT | Performed by: INTERNAL MEDICINE

## 2017-09-17 PROCEDURE — 82948 REAGENT STRIP/BLOOD GLUCOSE: CPT

## 2017-09-17 PROCEDURE — 6360000002 HC RX W HCPCS: Performed by: INTERNAL MEDICINE

## 2017-09-17 PROCEDURE — 83735 ASSAY OF MAGNESIUM: CPT

## 2017-09-17 PROCEDURE — 80048 BASIC METABOLIC PNL TOTAL CA: CPT

## 2017-09-17 RX ORDER — AMLODIPINE BESYLATE 5 MG/1
10 TABLET ORAL DAILY
Status: DISCONTINUED | OUTPATIENT
Start: 2017-09-18 | End: 2017-09-17 | Stop reason: HOSPADM

## 2017-09-17 RX ORDER — CEFDINIR 300 MG/1
300 CAPSULE ORAL EVERY 12 HOURS SCHEDULED
Status: DISCONTINUED | OUTPATIENT
Start: 2017-09-17 | End: 2017-09-17 | Stop reason: HOSPADM

## 2017-09-17 RX ORDER — POTASSIUM CHLORIDE 1.5 G/1.77G
20 POWDER, FOR SOLUTION ORAL 2 TIMES DAILY
Qty: 60 EACH | Refills: 0 | Status: SHIPPED | OUTPATIENT
Start: 2017-09-17

## 2017-09-17 RX ORDER — METOPROLOL TARTRATE 100 MG/1
50 TABLET ORAL 2 TIMES DAILY
Qty: 30 TABLET | Refills: 0 | Status: SHIPPED | OUTPATIENT
Start: 2017-09-17

## 2017-09-17 RX ORDER — FUROSEMIDE 40 MG/1
40 TABLET ORAL DAILY
Qty: 30 TABLET | Refills: 0 | Status: SHIPPED | OUTPATIENT
Start: 2017-09-17

## 2017-09-17 RX ORDER — SPIRONOLACTONE 25 MG/1
25 TABLET ORAL DAILY
Qty: 30 TABLET | Refills: 0 | Status: SHIPPED | OUTPATIENT
Start: 2017-09-17

## 2017-09-17 RX ORDER — HYDRALAZINE HYDROCHLORIDE 50 MG/1
50 TABLET, FILM COATED ORAL EVERY 12 HOURS SCHEDULED
Qty: 60 TABLET | Refills: 0 | Status: SHIPPED | OUTPATIENT
Start: 2017-09-17

## 2017-09-17 RX ORDER — ENALAPRIL MALEATE 20 MG/1
20 TABLET ORAL 2 TIMES DAILY
Qty: 60 TABLET | Refills: 0 | Status: SHIPPED | OUTPATIENT
Start: 2017-09-17

## 2017-09-17 RX ORDER — AMLODIPINE BESYLATE 5 MG/1
10 TABLET ORAL DAILY
Qty: 60 TABLET | Refills: 0 | Status: SHIPPED | OUTPATIENT
Start: 2017-09-17

## 2017-09-17 RX ORDER — CEFDINIR 300 MG/1
300 CAPSULE ORAL EVERY 12 HOURS SCHEDULED
Qty: 14 CAPSULE | Refills: 0 | Status: SHIPPED | OUTPATIENT
Start: 2017-09-17 | End: 2017-09-24

## 2017-09-17 RX ADMIN — ACETAMINOPHEN 650 MG: 325 TABLET, FILM COATED ORAL at 00:19

## 2017-09-17 RX ADMIN — LEVOTHYROXINE SODIUM 150 MCG: 0.15 TABLET ORAL at 06:12

## 2017-09-17 RX ADMIN — FUROSEMIDE 40 MG: 40 TABLET ORAL at 10:04

## 2017-09-17 RX ADMIN — OMEPRAZOLE 20 MG: 20 CAPSULE, DELAYED RELEASE ORAL at 06:12

## 2017-09-17 RX ADMIN — ENALAPRIL MALEATE 20 MG: 20 TABLET ORAL at 07:30

## 2017-09-17 RX ADMIN — Medication 10 ML: at 10:14

## 2017-09-17 RX ADMIN — ACETAMINOPHEN 650 MG: 325 TABLET, FILM COATED ORAL at 07:33

## 2017-09-17 RX ADMIN — AMLODIPINE BESYLATE 5 MG: 5 TABLET ORAL at 07:30

## 2017-09-17 RX ADMIN — SERTRALINE HYDROCHLORIDE 100 MG: 100 TABLET, FILM COATED ORAL at 10:11

## 2017-09-17 RX ADMIN — CEFDINIR 300 MG: 300 CAPSULE ORAL at 10:04

## 2017-09-17 RX ADMIN — SPIRONOLACTONE 25 MG: 25 TABLET, FILM COATED ORAL at 10:04

## 2017-09-17 RX ADMIN — HYDRALAZINE HYDROCHLORIDE 50 MG: 50 TABLET, FILM COATED ORAL at 07:29

## 2017-09-17 RX ADMIN — ENOXAPARIN SODIUM 40 MG: 40 INJECTION SUBCUTANEOUS at 10:03

## 2017-09-17 RX ADMIN — METOPROLOL TARTRATE 100 MG: 100 TABLET ORAL at 11:03

## 2017-09-17 RX ADMIN — ASPIRIN 81 MG: 81 TABLET ORAL at 10:10

## 2017-09-17 RX ADMIN — SUCRALFATE 1 G: 1 TABLET ORAL at 10:10

## 2017-09-17 ASSESSMENT — PAIN SCALES - GENERAL
PAINLEVEL_OUTOF10: 4
PAINLEVEL_OUTOF10: 5
PAINLEVEL_OUTOF10: 3
PAINLEVEL_OUTOF10: 4

## 2017-09-17 ASSESSMENT — PAIN DESCRIPTION - LOCATION: LOCATION: HEAD

## 2017-09-17 ASSESSMENT — PAIN DESCRIPTION - PAIN TYPE: TYPE: ACUTE PAIN

## 2017-09-18 LAB
BLOOD CULTURE, ROUTINE: NORMAL
CULTURE, BLOOD 2: NORMAL
EKG P AXIS: 71 DEGREES
EKG P-R INTERVAL: 144 MS
EKG Q-T INTERVAL: 400 MS
EKG QRS DURATION: 88 MS
EKG QTC CALCULATION (BAZETT): 436 MS
EKG T AXIS: 61 DEGREES

## 2017-09-19 LAB — HBA1C MFR BLD: 14.8 %

## 2021-03-17 ENCOUNTER — HOSPITAL ENCOUNTER (INPATIENT)
Facility: HOSPITAL | Age: 51
LOS: 7 days | Discharge: HOME | End: 2021-03-24
Attending: THORACIC SURGERY (CARDIOTHORACIC VASCULAR SURGERY) | Admitting: THORACIC SURGERY (CARDIOTHORACIC VASCULAR SURGERY)

## 2021-03-17 DIAGNOSIS — Z74.09 IMPAIRED FUNCTIONAL MOBILITY AND ACTIVITY TOLERANCE: ICD-10-CM

## 2021-03-17 DIAGNOSIS — E87.8 ELECTROLYTE ABNORMALITY: ICD-10-CM

## 2021-03-17 DIAGNOSIS — I25.110 CORONARY ARTERY DISEASE INVOLVING NATIVE CORONARY ARTERY OF NATIVE HEART WITH UNSTABLE ANGINA PECTORIS (HCC): Primary | ICD-10-CM

## 2021-03-17 DIAGNOSIS — I21.4 NSTEMI (NON-ST ELEVATED MYOCARDIAL INFARCTION) (HCC): ICD-10-CM

## 2021-03-17 LAB — GLUCOSE BLDC GLUCOMTR-MCNC: 243 MG/DL (ref 70–130)

## 2021-03-17 PROCEDURE — 82962 GLUCOSE BLOOD TEST: CPT

## 2021-03-17 RX ORDER — ASPIRIN 81 MG/1
81 TABLET ORAL DAILY
Status: DISCONTINUED | OUTPATIENT
Start: 2021-03-18 | End: 2021-03-19 | Stop reason: HOSPADM

## 2021-03-17 RX ORDER — CARVEDILOL 6.25 MG/1
6.25 TABLET ORAL 2 TIMES DAILY WITH MEALS
Status: DISCONTINUED | OUTPATIENT
Start: 2021-03-18 | End: 2021-03-19 | Stop reason: HOSPADM

## 2021-03-17 RX ORDER — SERTRALINE HYDROCHLORIDE 100 MG/1
100 TABLET, FILM COATED ORAL DAILY
COMMUNITY

## 2021-03-17 RX ORDER — CARVEDILOL 6.25 MG/1
6.25 TABLET ORAL 2 TIMES DAILY WITH MEALS
COMMUNITY
End: 2021-03-24 | Stop reason: HOSPADM

## 2021-03-17 RX ORDER — METOPROLOL TARTRATE 50 MG/1
50 TABLET, FILM COATED ORAL EVERY 12 HOURS SCHEDULED
Status: DISCONTINUED | OUTPATIENT
Start: 2021-03-18 | End: 2021-03-19 | Stop reason: HOSPADM

## 2021-03-17 RX ORDER — ENALAPRIL MALEATE 20 MG/1
20 TABLET ORAL 2 TIMES DAILY
COMMUNITY
End: 2021-03-24 | Stop reason: HOSPADM

## 2021-03-17 RX ORDER — ATORVASTATIN CALCIUM 40 MG/1
40 TABLET, FILM COATED ORAL NIGHTLY
Status: DISCONTINUED | OUTPATIENT
Start: 2021-03-18 | End: 2021-03-19 | Stop reason: HOSPADM

## 2021-03-17 RX ORDER — ENALAPRIL MALEATE 10 MG/1
20 TABLET ORAL EVERY 12 HOURS SCHEDULED
Status: DISCONTINUED | OUTPATIENT
Start: 2021-03-18 | End: 2021-03-19 | Stop reason: HOSPADM

## 2021-03-17 RX ORDER — LEVOTHYROXINE SODIUM 0.15 MG/1
150 TABLET ORAL
Status: DISCONTINUED | OUTPATIENT
Start: 2021-03-18 | End: 2021-03-19 | Stop reason: HOSPADM

## 2021-03-17 RX ORDER — NITROGLYCERIN 0.4 MG/1
0.4 TABLET SUBLINGUAL
Status: DISCONTINUED | OUTPATIENT
Start: 2021-03-17 | End: 2021-03-19 | Stop reason: HOSPADM

## 2021-03-17 RX ORDER — METOPROLOL TARTRATE 50 MG/1
50 TABLET, FILM COATED ORAL 2 TIMES DAILY
Status: ON HOLD | COMMUNITY
End: 2021-03-24 | Stop reason: SDUPTHER

## 2021-03-17 RX ORDER — LEVOTHYROXINE SODIUM 0.15 MG/1
150 TABLET ORAL DAILY
COMMUNITY

## 2021-03-17 RX ORDER — PRAVASTATIN SODIUM 40 MG
40 TABLET ORAL NIGHTLY
COMMUNITY
End: 2021-03-24 | Stop reason: HOSPADM

## 2021-03-17 RX ORDER — NICOTINE POLACRILEX 4 MG
15 LOZENGE BUCCAL
Status: DISCONTINUED | OUTPATIENT
Start: 2021-03-17 | End: 2021-03-19 | Stop reason: HOSPADM

## 2021-03-17 RX ORDER — SERTRALINE HYDROCHLORIDE 100 MG/1
100 TABLET, FILM COATED ORAL DAILY
Status: DISCONTINUED | OUTPATIENT
Start: 2021-03-18 | End: 2021-03-19 | Stop reason: HOSPADM

## 2021-03-17 RX ORDER — ASPIRIN 81 MG/1
81 TABLET, CHEWABLE ORAL DAILY
COMMUNITY

## 2021-03-17 RX ORDER — DEXTROSE MONOHYDRATE 25 G/50ML
25 INJECTION, SOLUTION INTRAVENOUS
Status: DISCONTINUED | OUTPATIENT
Start: 2021-03-17 | End: 2021-03-19 | Stop reason: HOSPADM

## 2021-03-17 RX ADMIN — METOPROLOL TARTRATE 50 MG: 50 TABLET, FILM COATED ORAL at 23:57

## 2021-03-17 RX ADMIN — ATORVASTATIN CALCIUM 40 MG: 40 TABLET, FILM COATED ORAL at 23:57

## 2021-03-17 RX ADMIN — INSULIN DETEMIR 75 UNITS: 100 INJECTION, SOLUTION SUBCUTANEOUS at 23:57

## 2021-03-17 RX ADMIN — ENALAPRIL MALEATE 20 MG: 10 TABLET ORAL at 23:57

## 2021-03-18 ENCOUNTER — APPOINTMENT (OUTPATIENT)
Dept: ULTRASOUND IMAGING | Facility: HOSPITAL | Age: 51
End: 2021-03-18

## 2021-03-18 ENCOUNTER — APPOINTMENT (OUTPATIENT)
Dept: CT IMAGING | Facility: HOSPITAL | Age: 51
End: 2021-03-18

## 2021-03-18 ENCOUNTER — APPOINTMENT (OUTPATIENT)
Dept: PULMONOLOGY | Facility: HOSPITAL | Age: 51
End: 2021-03-18

## 2021-03-18 ENCOUNTER — APPOINTMENT (OUTPATIENT)
Dept: CARDIOLOGY | Facility: HOSPITAL | Age: 51
End: 2021-03-18

## 2021-03-18 PROBLEM — Z72.0 TOBACCO USE: Status: ACTIVE | Noted: 2021-03-18

## 2021-03-18 PROBLEM — I10 HYPERTENSION: Status: ACTIVE | Noted: 2021-03-18

## 2021-03-18 PROBLEM — K27.9 PEPTIC ULCER: Status: ACTIVE | Noted: 2021-03-18

## 2021-03-18 PROBLEM — I50.20 SYSTOLIC CONGESTIVE HEART FAILURE: Status: ACTIVE | Noted: 2021-03-18

## 2021-03-18 PROBLEM — M19.90 OSTEOARTHRITIS: Status: ACTIVE | Noted: 2021-03-18

## 2021-03-18 PROBLEM — R91.8 LUNG NODULES: Status: ACTIVE | Noted: 2021-03-18

## 2021-03-18 PROBLEM — E66.813 CLASS 3 SEVERE OBESITY DUE TO EXCESS CALORIES WITH SERIOUS COMORBIDITY AND BODY MASS INDEX (BMI) OF 40.0 TO 44.9 IN ADULT: Status: ACTIVE | Noted: 2021-03-18

## 2021-03-18 PROBLEM — I25.110 CORONARY ARTERY DISEASE INVOLVING NATIVE CORONARY ARTERY OF NATIVE HEART WITH UNSTABLE ANGINA PECTORIS: Status: ACTIVE | Noted: 2021-03-18

## 2021-03-18 PROBLEM — F32.A ANXIETY AND DEPRESSION: Status: ACTIVE | Noted: 2021-03-18

## 2021-03-18 PROBLEM — E11.65 POORLY CONTROLLED DIABETES MELLITUS: Status: ACTIVE | Noted: 2021-03-18

## 2021-03-18 PROBLEM — E66.01 CLASS 3 SEVERE OBESITY DUE TO EXCESS CALORIES WITH SERIOUS COMORBIDITY AND BODY MASS INDEX (BMI) OF 40.0 TO 44.9 IN ADULT (HCC): Status: ACTIVE | Noted: 2021-03-18

## 2021-03-18 PROBLEM — F41.9 ANXIETY AND DEPRESSION: Status: ACTIVE | Noted: 2021-03-18

## 2021-03-18 PROBLEM — I21.4 NSTEMI (NON-ST ELEVATED MYOCARDIAL INFARCTION): Status: ACTIVE | Noted: 2021-03-18

## 2021-03-18 PROBLEM — E03.9 HYPOTHYROID: Status: ACTIVE | Noted: 2021-03-18

## 2021-03-18 LAB
ABO GROUP BLD: NORMAL
ALBUMIN SERPL-MCNC: 3.2 G/DL (ref 3.5–5.2)
ALBUMIN/GLOB SERPL: 0.9 G/DL
ALP SERPL-CCNC: 71 U/L (ref 39–117)
ALT SERPL W P-5'-P-CCNC: 16 U/L (ref 1–33)
ANION GAP SERPL CALCULATED.3IONS-SCNC: 9 MMOL/L (ref 5–15)
ARTERIAL PATENCY WRIST A: POSITIVE
AST SERPL-CCNC: 17 U/L (ref 1–32)
ATMOSPHERIC PRESS: 744 MMHG
BASE EXCESS BLDA CALC-SCNC: 2.8 MMOL/L (ref 0–2)
BDY SITE: ABNORMAL
BILIRUB SERPL-MCNC: 0.3 MG/DL (ref 0–1.2)
BLD GP AB SCN SERPL QL: NEGATIVE
BODY TEMPERATURE: 37 C
BUN SERPL-MCNC: 12 MG/DL (ref 6–20)
BUN/CREAT SERPL: 27.9 (ref 7–25)
CALCIUM SPEC-SCNC: 9.1 MG/DL (ref 8.6–10.5)
CHLORIDE SERPL-SCNC: 102 MMOL/L (ref 98–107)
CO2 SERPL-SCNC: 26 MMOL/L (ref 22–29)
CREAT SERPL-MCNC: 0.43 MG/DL (ref 0.57–1)
DEPRECATED RDW RBC AUTO: 36.8 FL (ref 37–54)
ERYTHROCYTE [DISTWIDTH] IN BLOOD BY AUTOMATED COUNT: 12.6 % (ref 12.3–15.4)
GFR SERPL CREATININE-BSD FRML MDRD: >150 ML/MIN/1.73
GLOBULIN UR ELPH-MCNC: 3.5 GM/DL
GLUCOSE BLDC GLUCOMTR-MCNC: 276 MG/DL (ref 70–130)
GLUCOSE BLDC GLUCOMTR-MCNC: 80 MG/DL (ref 70–130)
GLUCOSE BLDC GLUCOMTR-MCNC: 83 MG/DL (ref 70–130)
GLUCOSE BLDC GLUCOMTR-MCNC: 94 MG/DL (ref 70–130)
GLUCOSE SERPL-MCNC: 203 MG/DL (ref 65–99)
HBA1C MFR BLD: 12.8 % (ref 4.8–5.6)
HCO3 BLDA-SCNC: 26.8 MMOL/L (ref 20–26)
HCT VFR BLD AUTO: 35.8 % (ref 34–46.6)
HGB BLD-MCNC: 12 G/DL (ref 12–15.9)
Lab: ABNORMAL
MCH RBC QN AUTO: 27.1 PG (ref 26.6–33)
MCHC RBC AUTO-ENTMCNC: 33.5 G/DL (ref 31.5–35.7)
MCV RBC AUTO: 80.8 FL (ref 79–97)
MODALITY: ABNORMAL
PCO2 BLDA: 38.2 MM HG (ref 35–45)
PCO2 TEMP ADJ BLD: 38.2 MM HG (ref 35–45)
PH BLDA: 7.46 PH UNITS (ref 7.35–7.45)
PH, TEMP CORRECTED: 7.46 PH UNITS (ref 7.35–7.45)
PLATELET # BLD AUTO: 365 10*3/MM3 (ref 140–450)
PMV BLD AUTO: 10.2 FL (ref 6–12)
PO2 BLDA: 89.9 MM HG (ref 83–108)
PO2 TEMP ADJ BLD: 89.9 MM HG (ref 83–108)
POTASSIUM SERPL-SCNC: 3.8 MMOL/L (ref 3.5–5.2)
PROT SERPL-MCNC: 6.7 G/DL (ref 6–8.5)
RBC # BLD AUTO: 4.43 10*6/MM3 (ref 3.77–5.28)
RH BLD: POSITIVE
SAO2 % BLDCOA: 96.2 % (ref 94–99)
SODIUM SERPL-SCNC: 137 MMOL/L (ref 136–145)
T&S EXPIRATION DATE: NORMAL
VENTILATOR MODE: ABNORMAL
WBC # BLD AUTO: 7.74 10*3/MM3 (ref 3.4–10.8)

## 2021-03-18 PROCEDURE — 86920 COMPATIBILITY TEST SPIN: CPT

## 2021-03-18 PROCEDURE — 63710000001 INSULIN DETEMIR PER 5 UNITS: Performed by: THORACIC SURGERY (CARDIOTHORACIC VASCULAR SURGERY)

## 2021-03-18 PROCEDURE — 99222 1ST HOSP IP/OBS MODERATE 55: CPT | Performed by: NURSE PRACTITIONER

## 2021-03-18 PROCEDURE — 82803 BLOOD GASES ANY COMBINATION: CPT

## 2021-03-18 PROCEDURE — 86900 BLOOD TYPING SEROLOGIC ABO: CPT | Performed by: NURSE PRACTITIONER

## 2021-03-18 PROCEDURE — 63710000001 INSULIN DETEMIR PER 5 UNITS: Performed by: NURSE PRACTITIONER

## 2021-03-18 PROCEDURE — 94729 DIFFUSING CAPACITY: CPT

## 2021-03-18 PROCEDURE — 82962 GLUCOSE BLOOD TEST: CPT

## 2021-03-18 PROCEDURE — 94729 DIFFUSING CAPACITY: CPT | Performed by: INTERNAL MEDICINE

## 2021-03-18 PROCEDURE — 93880 EXTRACRANIAL BILAT STUDY: CPT

## 2021-03-18 PROCEDURE — 86900 BLOOD TYPING SEROLOGIC ABO: CPT

## 2021-03-18 PROCEDURE — 94060 EVALUATION OF WHEEZING: CPT

## 2021-03-18 PROCEDURE — 86901 BLOOD TYPING SEROLOGIC RH(D): CPT

## 2021-03-18 PROCEDURE — 83036 HEMOGLOBIN GLYCOSYLATED A1C: CPT | Performed by: THORACIC SURGERY (CARDIOTHORACIC VASCULAR SURGERY)

## 2021-03-18 PROCEDURE — 94726 PLETHYSMOGRAPHY LUNG VOLUMES: CPT | Performed by: INTERNAL MEDICINE

## 2021-03-18 PROCEDURE — 86901 BLOOD TYPING SEROLOGIC RH(D): CPT | Performed by: NURSE PRACTITIONER

## 2021-03-18 PROCEDURE — 80053 COMPREHEN METABOLIC PANEL: CPT | Performed by: THORACIC SURGERY (CARDIOTHORACIC VASCULAR SURGERY)

## 2021-03-18 PROCEDURE — 36600 WITHDRAWAL OF ARTERIAL BLOOD: CPT

## 2021-03-18 PROCEDURE — 94060 EVALUATION OF WHEEZING: CPT | Performed by: INTERNAL MEDICINE

## 2021-03-18 PROCEDURE — 94799 UNLISTED PULMONARY SVC/PX: CPT

## 2021-03-18 PROCEDURE — 71275 CT ANGIOGRAPHY CHEST: CPT

## 2021-03-18 PROCEDURE — 0 IOPAMIDOL PER 1 ML: Performed by: THORACIC SURGERY (CARDIOTHORACIC VASCULAR SURGERY)

## 2021-03-18 PROCEDURE — 85027 COMPLETE CBC AUTOMATED: CPT | Performed by: THORACIC SURGERY (CARDIOTHORACIC VASCULAR SURGERY)

## 2021-03-18 PROCEDURE — 93306 TTE W/DOPPLER COMPLETE: CPT | Performed by: INTERNAL MEDICINE

## 2021-03-18 PROCEDURE — 94726 PLETHYSMOGRAPHY LUNG VOLUMES: CPT

## 2021-03-18 PROCEDURE — 86850 RBC ANTIBODY SCREEN: CPT | Performed by: NURSE PRACTITIONER

## 2021-03-18 PROCEDURE — 93306 TTE W/DOPPLER COMPLETE: CPT

## 2021-03-18 PROCEDURE — 93880 EXTRACRANIAL BILAT STUDY: CPT | Performed by: SURGERY

## 2021-03-18 RX ORDER — ALBUTEROL SULFATE 2.5 MG/3ML
2.5 SOLUTION RESPIRATORY (INHALATION) ONCE
Status: COMPLETED | OUTPATIENT
Start: 2021-03-18 | End: 2021-03-18

## 2021-03-18 RX ORDER — NYSTATIN 100000 [USP'U]/G
POWDER TOPICAL EVERY 12 HOURS SCHEDULED
Status: DISCONTINUED | OUTPATIENT
Start: 2021-03-18 | End: 2021-03-19 | Stop reason: HOSPADM

## 2021-03-18 RX ORDER — FAMOTIDINE 20 MG/1
20 TABLET, FILM COATED ORAL
Status: DISCONTINUED | OUTPATIENT
Start: 2021-03-18 | End: 2021-03-19 | Stop reason: HOSPADM

## 2021-03-18 RX ADMIN — NYSTATIN: 100000 POWDER TOPICAL at 22:38

## 2021-03-18 RX ADMIN — ENALAPRIL MALEATE 20 MG: 10 TABLET ORAL at 22:19

## 2021-03-18 RX ADMIN — ALBUTEROL SULFATE 2.5 MG: 2.5 SOLUTION RESPIRATORY (INHALATION) at 16:12

## 2021-03-18 RX ADMIN — LEVOTHYROXINE SODIUM 150 MCG: 150 TABLET ORAL at 06:09

## 2021-03-18 RX ADMIN — CARVEDILOL 6.25 MG: 6.25 TABLET, FILM COATED ORAL at 09:37

## 2021-03-18 RX ADMIN — FAMOTIDINE 20 MG: 20 TABLET, FILM COATED ORAL at 18:37

## 2021-03-18 RX ADMIN — INSULIN DETEMIR 50 UNITS: 100 INJECTION, SOLUTION SUBCUTANEOUS at 22:18

## 2021-03-18 RX ADMIN — SERTRALINE 100 MG: 100 TABLET, FILM COATED ORAL at 09:37

## 2021-03-18 RX ADMIN — CARVEDILOL 6.25 MG: 6.25 TABLET, FILM COATED ORAL at 18:37

## 2021-03-18 RX ADMIN — ATORVASTATIN CALCIUM 40 MG: 40 TABLET, FILM COATED ORAL at 22:19

## 2021-03-18 RX ADMIN — METOPROLOL TARTRATE 50 MG: 50 TABLET, FILM COATED ORAL at 09:37

## 2021-03-18 RX ADMIN — METOPROLOL TARTRATE 50 MG: 50 TABLET, FILM COATED ORAL at 22:20

## 2021-03-18 RX ADMIN — IOPAMIDOL 100 ML: 755 INJECTION, SOLUTION INTRAVENOUS at 11:13

## 2021-03-18 RX ADMIN — ENALAPRIL MALEATE 20 MG: 10 TABLET ORAL at 09:37

## 2021-03-18 RX ADMIN — ASPIRIN 81 MG: 81 TABLET, COATED ORAL at 09:37

## 2021-03-18 NOTE — PLAN OF CARE
Problem: Adult Inpatient Plan of Care  Goal: Plan of Care Review  Outcome: Ongoing, Progressing  Flowsheets (Taken 3/17/2021 0604)  Plan of Care Reviewed With:   patient   spouse  Outcome Summary: Patient direct admit from [facility] after having positive troponin that peaked at 7.37.  Patient was swabbed for covid 3/12 and was positive.  swabbed again 12/16 and 12/17 and both were negative.  Patient remained asymptomatic the whole time.  She had a Right groin Heart Cath that showed severe multivessel disease and an EF of 40 %.  Since she was negative today, she was transfered here to .  currently awaiting orders from admitting physician.   Goal Outcome Evaluation:  Plan of Care Reviewed With: patient, spouse     Outcome Summary: Patient direct admit from [facility] after having positive troponin that peaked at 7.37.  Patient was swabbed for covid 3/12 and was positive.  swabbed again 12/16 and 12/17 and both were negative.  Patient remained asymptomatic the whole time.  She had a Right groin Heart Cath that showed severe multivessel disease and an EF of 40 %.  Since she was negative today, she was transfered here to .  currently awaiting orders from admitting physician.

## 2021-03-18 NOTE — ACP (ADVANCE CARE PLANNING)
Date of First Steps ACP interview: 3/18/2021  Location of interview: Pt's room  First Steps ACP Facilitator: Carla Garcia RN  Referral Source: consult  Present for facilitation: Patient    SUMMARY OF ADVANCE CARE PLANNING DISCUSSION:  Krys visited for consult for First Steps facilitation. We reviewed purpose and goals for advance care planning.    Krys states she did not request but will be willing to hear information concerning ACP.  We reviewed purpose and selection of healthcare surrogates.  She was very quiet and states she will review the information with her family.    Goals of medical care for severe, permanent brain injury were explored: Yes     Education materials were provided on: Advance Care Planning and Healthcare Agent Selection    Each section of the advance care/living will document was reviewed and discussed.    Advance care/living will document finished during this facilitation? no    Time spent on preparation, facilitation and documentation was under 30 minutes.    RECOMMENDATIONS/FOLLOW-UP:  Pt will review information with her family.  Contact information for facilitators given.    CONSULT/NOTE ROUTED  yes    Carla Garcia RN

## 2021-03-18 NOTE — PROGRESS NOTES
Continued Stay Note   Bubba     Patient Name: Krys Cox  MRN: [id number]  Today's Date: 3/18/2021    Admit Date: 3/17/2021    Discharge Plan     Row Name 03/18/21 0925       Plan    Plan Comments  REFERRAL CALLED TO [facility] FOR NO-INS SCREENING TO DETERMINE IF PT. ELIGIBLE FOR [place].  WILL FOLLOW OUTCOME.        Discharge Codes    No documentation.             SINDHU Mcguire

## 2021-03-18 NOTE — CONSULTS
"Pt awake and alert.  Admitted with A1C of 12.8%.  Up in chair for meal.  Pt states her  was laid off over a year ago.  She has been getting her medical care at [facility] and they have made arrangement for Insulin.  She does admit that she has not been following a meal plan.  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  Discussed with pt that she was going to have to make lifestyle changes if she wanted a good outcome from pending surgery.  She was told today that her  makes too much money to qualify for a medical card.  She has had a bad year and realizes that she has been trying to take care of everyone else and not herself.  She was tearful.  Will cont to pursue options and help with regimen changes.  "

## 2021-03-18 NOTE — H&P
History and Physical     CC: chest pain, nstemi     Subjective     History of Present Illness     Mrs. Krys Cox is a 51 year old female transferred from [facility] on 3/17/2021 for evaluation by cardiothoracic surgery for possible coronary artery bypass grafting. One week ago, 3/11/2021, she developed indigestion type symptoms. She believed this was just reflux. She did not seek evaluation until the following day, 3/12/2021, when the indigestion progressed to diaphoresis, nausea, and chest pain. She presented to [facility] emergency department and was ultimately admitted for NSTEMI workup. Initial troponin was 4.11 and blood glucose was 289. BNP was elevated. Cardiology was consulted with heart cath planned but placed on hold due to a positive covid test on 3/12. She indicates she remained chest pain free that time. She was given oral vitamin C, D, and zinc. 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 She required an insulin gtt for hyperglycemia during that time. She was retested for covid on 3/16 and tested negative. Cardiac catheterization was completed on 3/17/2021 by Dr. Gavin. Findings identified multivessel coronary artery disease with depressed LV function (formal cath report not available for review). Dr. Gavin contacted Dr. Burgos with decision-making made to transfer her to [facility] for higher level of care. A 3rd covid test was performed prior to transfer and was reported to be negative as well.     Currently, Mrs. Cox is sitting up in the chair. Denies chest pain and shortness of breath now. She follows with Karolina DIMAS from [facility] in [place], [place] for primary care. Her hemoglobin A1c is 12.8% upon admission. She indicates this is much improved as she thinks her previous one was 16%. She takes Levemir 100 units nightly with Humalog sliding scale three times per day. Her  "blood sugar runs around 400 at home. She does not have medical insurance but indicates she is able to get her medicines and insulin from [facility]. She has been an insulin diabetic for 20 years.     She smokes 1 ppd of cigarettes for approximately 30 years. She has tried to quit smoking but has been unsuccessful. Says she is active and takes care of her grandchildren. Previous surgical history includes 3 caesarean sections, D&C, and tubal ligation. Denies alcohol or illicit drug use. Denies knowledge of liver or kidney disease. She was hospitalized for 1 week at [facility] in 2017 for shortness of breath and volume overload from congestive heart failure.       Review of Systems   Constitutional: Positive for activity change and fatigue. Negative for appetite change, chills, diaphoresis, fever and unexpected weight change.   HENT: Negative for sinus pressure, sinus pain, sore throat, trouble swallowing and voice change.         History of long term loss of smell \"runs in the family\"   Eyes: Negative for visual disturbance.   Respiratory: Positive for shortness of breath.    Cardiovascular: Positive for chest pain. Negative for palpitations and leg swelling.   Gastrointestinal: Positive for nausea. Negative for abdominal pain, constipation, diarrhea and vomiting.   Genitourinary: Negative for difficulty urinating.   Musculoskeletal: Positive for arthralgias. Negative for gait problem and myalgias.   Skin: Negative for pallor, rash and wound.   Neurological: Negative for dizziness, tremors, seizures, syncope, speech difficulty, weakness and numbness.   Hematological: Does not bruise/bleed easily.   Psychiatric/Behavioral: Negative for agitation and confusion. The patient is nervous/anxious.           Past Medical History:   Diagnosis Date   • Arthritis    • CHF (congestive heart failure) (CMS/[facility])    • Diabetes mellitus (CMS/[facility])    • Disease of thyroid gland    • Elevated cholesterol    • Hypertension  "     Past Surgical History:   Procedure Laterality Date   • CARDIAC CATHETERIZATION     •  SECTION      she has had 4   • DILATATION AND CURETTAGE     • TUBAL ABDOMINAL LIGATION       No family history on file.  Social History     Tobacco Use   • Smoking status: Current Every Day Smoker     Packs/day: 1.00     Years: 30.00     Pack years: 30.00     Types: Cigarettes     Start date: 3/17/1986   • Tobacco comment: she want to quit but dont want counseling   Vaping Use   • Vaping Use: Never used   Substance Use Topics   • Alcohol use: Not Currently   • Drug use: Never     Medications Prior to Admission   Medication Sig Dispense Refill Last Dose   • aspirin 81 MG chewable tablet Chew 81 mg Daily.      • carvedilol (COREG) 6.25 MG tablet Take 6.25 mg by mouth 2 (Two) Times a Day With Meals.      • enalapril (VASOTEC) 20 MG tablet Take 20 mg by mouth 2 (Two) Times a Day.      • insulin detemir (LEVEMIR) 100 UNIT/ML injection Inject 100 Units under the skin into the appropriate area as directed Every Night.      • insulin lispro (humaLOG) 100 UNIT/ML injection Inject  under the skin into the appropriate area as directed 3 (Three) Times a Day Before Meals. BS-100/20+#unit      • levothyroxine (SYNTHROID, LEVOTHROID) 150 MCG tablet Take 150 mcg by mouth Daily.      • metoprolol tartrate (LOPRESSOR) 50 MG tablet Take 50 mg by mouth 2 (Two) Times a Day.      • pravastatin (PRAVACHOL) 40 MG tablet Take 40 mg by mouth Every Night.      • sertraline (ZOLOFT) 100 MG tablet Take 100 mg by mouth Daily.      • vitamin D3 125 MCG (5000 UT) capsule capsule Take 10,000 Units by mouth Daily.        Allergies:  Patient has no known allergies.    Objective      Vital Signs  Temp:  [98.1 °F (36.7 °C)-98.4 °F (36.9 °C)] 98.4 °F (36.9 °C)  Heart Rate:  [64-76] 64  Resp:  [18] 18  BP: (117-147)/(48-69) 126/69    Physical Exam  Vitals and nursing note reviewed.   Constitutional:       General: She is not in acute distress.      Appearance: She is well-developed. She is obese. She is not toxic-appearing or diaphoretic.   HENT:      Head: Normocephalic and atraumatic.   Eyes:      Pupils: Pupils are equal, round, and reactive to light.   Neck:      Vascular: No JVD.   Cardiovascular:      Rate and Rhythm: Normal rate and regular rhythm.      Heart sounds: Normal heart sounds. No murmur heard.   No friction rub.   Pulmonary:      Effort: Pulmonary effort is normal. No respiratory distress.      Breath sounds: Normal breath sounds. No stridor. No wheezing or rales.   Abdominal:      General: There is no distension.      Palpations: Abdomen is soft.      Tenderness: There is no abdominal tenderness.   Musculoskeletal:      Cervical back: Normal range of motion and neck supple.      Right lower leg: No edema.      Left lower leg: No edema.   Skin:     General: Skin is warm and dry.      Coloration: Skin is not pale.      Findings: No erythema or rash.      Comments: Right upper arm tender to palpation from previous PICC line from outlying facility. PICC line was previously removed and gauze dressing in place.    Neurological:      Mental Status: She is alert and oriented to person, place, and time.      Cranial Nerves: No cranial nerve deficit.   Psychiatric:         Behavior: Behavior normal.         Results Review:     Admission on 03/17/2021   Component Date Value Ref Range Status   • Glucose 03/17/2021 243* 70 - 130 mg/dL Final    : [id number] Galo MilliganahMeter ID: [id number]   • WBC 03/18/2021 7.74  3.40 - 10.80 10*3/mm3 Final   • RBC 03/18/2021 4.43  3.77 - 5.28 10*6/mm3 Final   • Hemoglobin 03/18/2021 12.0  12.0 - 15.9 g/dL Final   • Hematocrit 03/18/2021 35.8  34.0 - 46.6 % Final   • MCV 03/18/2021 80.8  79.0 - 97.0 fL Final   • MCH 03/18/2021 27.1  26.6 - 33.0 pg Final   • MCHC 03/18/2021 33.5  31.5 - 35.7 g/dL Final   • RDW 03/18/2021 12.6  12.3 - 15.4 % Final   • RDW-SD 03/18/2021 36.8* 37.0 - 54.0 fl Final   • MPV 03/18/2021  10.2  6.0 - 12.0 fL Final   • Platelets 03/18/2021 365  140 - 450 10*3/mm3 Final   • Glucose 03/18/2021 203* 65 - 99 mg/dL Final   • BUN 03/18/2021 12  6 - 20 mg/dL Final   • Creatinine 03/18/2021 0.43* 0.57 - 1.00 mg/dL Final   • Sodium 03/18/2021 137  136 - 145 mmol/L Final   • Potassium 03/18/2021 3.8  3.5 - 5.2 mmol/L Final   • Chloride 03/18/2021 102  98 - 107 mmol/L Final   • CO2 03/18/2021 26.0  22.0 - 29.0 mmol/L Final   • Calcium 03/18/2021 9.1  8.6 - 10.5 mg/dL Final   • Total Protein 03/18/2021 6.7  6.0 - 8.5 g/dL Final   • Albumin 03/18/2021 3.20* 3.50 - 5.20 g/dL Final   • ALT (SGPT) 03/18/2021 16  1 - 33 U/L Final   • AST (SGOT) 03/18/2021 17  1 - 32 U/L Final   • Alkaline Phosphatase 03/18/2021 71  39 - 117 U/L Final   • Total Bilirubin 03/18/2021 0.3  0.0 - 1.2 mg/dL Final   • eGFR Non African Amer 03/18/2021 >150  >60 mL/min/1.73 Final   • Globulin 03/18/2021 3.5  gm/dL Final   • A/G Ratio 03/18/2021 0.9  g/dL Final   • BUN/Creatinine Ratio 03/18/2021 27.9* 7.0 - 25.0 Final   • Anion Gap 03/18/2021 9.0  5.0 - 15.0 mmol/L Final   • Hemoglobin A1C 03/18/2021 12.80* 4.80 - 5.60 % Final   • Glucose 03/18/2021 94  70 - 130 mg/dL Final    : [id number] Stephane Covarrubias ID: [id number]   • Glucose 03/18/2021 83  70 - 130 mg/dL Final    : [id number] Romario De La Rosa ID: [id number]       I reviewed the patient's new clinical results.  Discussed with patient      Assessment/Plan       NSTEMI (non-ST elevated myocardial infarction) (CMS/[facility])    Coronary artery disease involving native coronary artery of native heart with unstable angina pectoris (CMS/[facility])    Hypertension    Poorly controlled diabetes mellitus (CMS/[facility])    Class 3 severe obesity due to excess calories with serious comorbidity and body mass index (BMI) of 40.0 to 44.9 in adult (CMS/[facility])    Hypothyroid    Tobacco use    Osteoarthritis    Anxiety    Systolic congestive heart failure (CMS/[facility])    Peptic ulcer      I discussed the  patients findings and my recommendations with patient. We discussed the options for treatment of coronary artery disease to include medical therapy, coronary stenting, and surgical revascularization.  We discussed the pros and cons of each option and how it pertains to the current case. Preoperative testing has been ordered. We discussed the criticality of glycemic control for both now and long term along with tobacco cessation. Diabetes and nutrition education have been ordered. She remains chest pain free and was instructed to notify nursing should this change.     We discussed the operative conduct and expected hospital and outpatient recovery.  Risks were discussed to include but not limited to bleeding, infection, stroke, heart attack, need for additional procedures, anesthesia risk, organ dysfunction and/or death, prolonged mechanical ventilation, prolonged ICU stay, chronic pain syndromes, sternal nonunion, and/or death.  We discussed the need to utilize all medical treatments additionally prescribed post surgery.         Will follow up this afternoon after testing completed for final recommendations. Will attempt to obtain formal catheterization report and COVID test results from [facility]. Will attempt to obtain records from her PCP regarding diabetic history. Social work consult.     DW patient and nursing. All questions answered to the best of my ability. Final recommendations forthcoming.       Karina Platt, APRN  03/18/21  11:45 CDT

## 2021-03-18 NOTE — PLAN OF CARE
Problem: Adult Inpatient Plan of Care  Goal: Plan of Care Review  3/18/2021 0503 by Leta Dunn, RN  Outcome: Ongoing, Progressing  Flowsheets (Taken 3/18/2021 0503)  Outcome Summary: Patient remains NPO.  No Pain.  Heart Cath site c/d/i.  Dressing removed.  Labs put in for this morning.  Con.t to monitor.  call for concerns.  3/17/2021 2250 by Leta Dunn RN  Outcome: Ongoing, Progressing  Flowsheets (Taken 3/17/2021 2250)  Plan of Care Reviewed With:   patient   spouse  Outcome Summary: Patient direct [place] [facility] [place] having positive troponin that peaked at 7.37.  Patient was swabbed for covid 3/12 and was positive.  swabbed again 12/16 and 12/17 and both were negative.  Patient remained asymptomatic the whole time.  She had a Right groin Heart Cath that showed severe multivessel disease and an EF of 40 %.  Since she was negative today, she was transfered here to .  currently awaiting orders from admitting physician.   Goal Outcome Evaluation:  Plan of Care Reviewed With: patient, spouse     Outcome Summary: Patient remains NPO.  No Pain.  Heart Cath site c/d/i.  Dressing removed.  Labs put in for this morning.  Con.t to monitor.  call for concerns.

## 2021-03-18 NOTE — PLAN OF CARE
Problem: Adult Inpatient Plan of Care  Goal: Plan of Care Review  Outcome: Ongoing, Progressing  Flowsheets (Taken 3/17/2021 7709)  Plan of Care Reviewed With:   patient   spouse  Outcome Summary: Patient direct admit from [facility] after having positive troponin that peaked at 7.37.  Patient was swabbed for covid 3/12 and was positive.  swabbed again 12/16 and 12/17 and both were negative.  Patient remained asymptomatic the whole time.  She had a Right groin Heart Cath that showed severe multivessel disease and an EF of 40 %.  Since she was negative today, she was transfered here to .  currently awaiting orders from admitting physician.   Goal Outcome Evaluation:

## 2021-03-19 ENCOUNTER — ANESTHESIA (OUTPATIENT)
Dept: PERIOP | Facility: HOSPITAL | Age: 51
End: 2021-03-19

## 2021-03-19 ENCOUNTER — APPOINTMENT (OUTPATIENT)
Dept: CARDIOLOGY | Facility: HOSPITAL | Age: 51
End: 2021-03-19

## 2021-03-19 ENCOUNTER — APPOINTMENT (OUTPATIENT)
Dept: GENERAL RADIOLOGY | Facility: HOSPITAL | Age: 51
End: 2021-03-19

## 2021-03-19 ENCOUNTER — ANESTHESIA EVENT (OUTPATIENT)
Dept: PERIOP | Facility: HOSPITAL | Age: 51
End: 2021-03-19

## 2021-03-19 LAB
A-A DO2: 105.5 MMHG
A-A DO2: 117.9 MMHG
A-A DO2: 157.6 MMHG
A-A DO2: 186.5 MMHG
A-A DO2: 289.6 MMHG
A-A DO2: 453.2 MMHG
ALBUMIN SERPL-MCNC: 2.8 G/DL (ref 3.5–5.2)
ALBUMIN SERPL-MCNC: 3.2 G/DL (ref 3.5–5.2)
ANION GAP SERPL CALCULATED.3IONS-SCNC: 5 MMOL/L (ref 5–15)
ANION GAP SERPL CALCULATED.3IONS-SCNC: 9 MMOL/L (ref 5–15)
APTT PPP: 36 SECONDS (ref 24.1–35)
ARTERIAL PATENCY WRIST A: ABNORMAL
ATMOSPHERIC PRESS: 757 MMHG
ATMOSPHERIC PRESS: 757 MMHG
ATMOSPHERIC PRESS: 758 MMHG
ATMOSPHERIC PRESS: 759 MMHG
BASE EXCESS BLDA CALC-SCNC: 0 MMOL/L (ref 0–2)
BASE EXCESS BLDA CALC-SCNC: 0.1 MMOL/L (ref 0–2)
BASE EXCESS BLDA CALC-SCNC: 1.5 MMOL/L (ref 0–2)
BASE EXCESS BLDA CALC-SCNC: 1.6 MMOL/L (ref 0–2)
BASE EXCESS BLDA CALC-SCNC: 2.2 MMOL/L (ref 0–2)
BASE EXCESS BLDA CALC-SCNC: 2.5 MMOL/L (ref 0–2)
BASE EXCESS BLDA CALC-SCNC: 2.5 MMOL/L (ref 0–2)
BASE EXCESS BLDA CALC-SCNC: 3.3 MMOL/L (ref 0–2)
BASE EXCESS BLDA CALC-SCNC: 5.4 MMOL/L (ref 0–2)
BDY SITE: ABNORMAL
BH CV ECHO MEAS - AO MAX PG (FULL): 9.4 MMHG
BH CV ECHO MEAS - AO MAX PG: 11.3 MMHG
BH CV ECHO MEAS - AO MEAN PG (FULL): 4 MMHG
BH CV ECHO MEAS - AO MEAN PG: 5 MMHG
BH CV ECHO MEAS - AO ROOT AREA (BSA CORRECTED): 1.6
BH CV ECHO MEAS - AO ROOT AREA: 8 CM^2
BH CV ECHO MEAS - AO ROOT DIAM: 3.2 CM
BH CV ECHO MEAS - AO V2 MAX: 168 CM/SEC
BH CV ECHO MEAS - AO V2 MEAN: 100 CM/SEC
BH CV ECHO MEAS - AO V2 VTI: 32.6 CM
BH CV ECHO MEAS - AVA(I,A): 1.6 CM^2
BH CV ECHO MEAS - AVA(I,D): 1.6 CM^2
BH CV ECHO MEAS - AVA(V,A): 1.3 CM^2
BH CV ECHO MEAS - AVA(V,D): 1.3 CM^2
BH CV ECHO MEAS - BSA(HAYCOCK): 2.2 M^2
BH CV ECHO MEAS - BSA: 2 M^2
BH CV ECHO MEAS - BZI_BMI: 44.6 KILOGRAMS/M^2
BH CV ECHO MEAS - BZI_METRIC_HEIGHT: 154.9 CM
BH CV ECHO MEAS - BZI_METRIC_WEIGHT: 107.1 KG
BH CV ECHO MEAS - EDV(CUBED): 147.2 ML
BH CV ECHO MEAS - EDV(MOD-SP4): 111 ML
BH CV ECHO MEAS - EDV(TEICH): 134.2 ML
BH CV ECHO MEAS - EF(CUBED): 76.2 %
BH CV ECHO MEAS - EF(MOD-SP4): 53.1 %
BH CV ECHO MEAS - EF(TEICH): 67.8 %
BH CV ECHO MEAS - ESV(CUBED): 35 ML
BH CV ECHO MEAS - ESV(MOD-SP4): 52.1 ML
BH CV ECHO MEAS - ESV(TEICH): 43.2 ML
BH CV ECHO MEAS - FS: 38.1 %
BH CV ECHO MEAS - IVS/LVPW: 0.95
BH CV ECHO MEAS - IVSD: 1.1 CM
BH CV ECHO MEAS - LA DIMENSION: 3.9 CM
BH CV ECHO MEAS - LA/AO: 1.2
BH CV ECHO MEAS - LAT PEAK E' VEL: 5.5 CM/SEC
BH CV ECHO MEAS - LV DIASTOLIC VOL/BSA (35-75): 54.8 ML/M^2
BH CV ECHO MEAS - LV MASS(C)D: 222.2 GRAMS
BH CV ECHO MEAS - LV MASS(C)DI: 109.6 GRAMS/M^2
BH CV ECHO MEAS - LV MAX PG: 1.9 MMHG
BH CV ECHO MEAS - LV MEAN PG: 1 MMHG
BH CV ECHO MEAS - LV SYSTOLIC VOL/BSA (12-30): 25.7 ML/M^2
BH CV ECHO MEAS - LV V1 MAX: 68.7 CM/SEC
BH CV ECHO MEAS - LV V1 MEAN: 45.3 CM/SEC
BH CV ECHO MEAS - LV V1 VTI: 16.2 CM
BH CV ECHO MEAS - LVIDD: 5.3 CM
BH CV ECHO MEAS - LVIDS: 3.3 CM
BH CV ECHO MEAS - LVLD AP4: 8.9 CM
BH CV ECHO MEAS - LVLS AP4: 7.9 CM
BH CV ECHO MEAS - LVOT AREA (M): 3.1 CM^2
BH CV ECHO MEAS - LVOT AREA: 3.1 CM^2
BH CV ECHO MEAS - LVOT DIAM: 2 CM
BH CV ECHO MEAS - LVPWD: 1.1 CM
BH CV ECHO MEAS - MED PEAK E' VEL: 4.5 CM/SEC
BH CV ECHO MEAS - MV A MAX VEL: 92.6 CM/SEC
BH CV ECHO MEAS - MV DEC SLOPE: 551.5 CM/SEC^2
BH CV ECHO MEAS - MV DEC TIME: 0.2 SEC
BH CV ECHO MEAS - MV E MAX VEL: 110 CM/SEC
BH CV ECHO MEAS - MV E/A: 1.2
BH CV ECHO MEAS - MV P1/2T MAX VEL: 143 CM/SEC
BH CV ECHO MEAS - MV P1/2T: 75.9 MSEC
BH CV ECHO MEAS - MVA P1/2T LCG: 1.5 CM^2
BH CV ECHO MEAS - MVA(P1/2T): 2.9 CM^2
BH CV ECHO MEAS - PA MAX PG: 4.5 MMHG
BH CV ECHO MEAS - PA V2 MAX: 106 CM/SEC
BH CV ECHO MEAS - RAP SYSTOLE: 5 MMHG
BH CV ECHO MEAS - RVSP: 20.1 MMHG
BH CV ECHO MEAS - SI(AO): 129.4 ML/M^2
BH CV ECHO MEAS - SI(CUBED): 55.4 ML/M^2
BH CV ECHO MEAS - SI(LVOT): 25.1 ML/M^2
BH CV ECHO MEAS - SI(MOD-SP4): 29.1 ML/M^2
BH CV ECHO MEAS - SI(TEICH): 44.9 ML/M^2
BH CV ECHO MEAS - SV(AO): 262.2 ML
BH CV ECHO MEAS - SV(CUBED): 112.2 ML
BH CV ECHO MEAS - SV(LVOT): 50.9 ML
BH CV ECHO MEAS - SV(MOD-SP4): 58.9 ML
BH CV ECHO MEAS - SV(TEICH): 91 ML
BH CV ECHO MEAS - TR MAX VEL: 194 CM/SEC
BH CV ECHO MEASUREMENTS AVERAGE E/E' RATIO: 22
BODY TEMPERATURE: 37 C
BUN SERPL-MCNC: 12 MG/DL (ref 6–20)
BUN SERPL-MCNC: 13 MG/DL (ref 6–20)
BUN/CREAT SERPL: 28.3 (ref 7–25)
BUN/CREAT SERPL: 30.8 (ref 7–25)
CA-I BLD-MCNC: 4.06 MG/DL (ref 4.6–5.4)
CA-I BLD-MCNC: 4.4 MG/DL (ref 4.6–5.4)
CA-I BLD-MCNC: 4.5 MG/DL (ref 4.6–5.4)
CA-I BLD-MCNC: 4.55 MG/DL (ref 4.6–5.4)
CA-I BLD-MCNC: 4.6 MG/DL (ref 4.6–5.4)
CA-I BLD-MCNC: 4.79 MG/DL (ref 4.6–5.4)
CA-I BLD-MCNC: 4.82 MG/DL (ref 4.6–5.4)
CA-I BLD-MCNC: 5.01 MG/DL (ref 4.6–5.4)
CALCIUM SPEC-SCNC: 7.8 MG/DL (ref 8.6–10.5)
CALCIUM SPEC-SCNC: 9 MG/DL (ref 8.6–10.5)
CHLORIDE SERPL-SCNC: 108 MMOL/L (ref 98–107)
CHLORIDE SERPL-SCNC: 112 MMOL/L (ref 98–107)
CO2 SERPL-SCNC: 22 MMOL/L (ref 22–29)
CO2 SERPL-SCNC: 25 MMOL/L (ref 22–29)
COHGB MFR BLD: 0.2 % (ref 0–5)
COHGB MFR BLD: 0.4 % (ref 0–5)
COHGB MFR BLD: 0.4 % (ref 0–5)
COHGB MFR BLD: 0.5 % (ref 0–5)
COHGB MFR BLD: 0.6 % (ref 0–5)
COHGB MFR BLD: 0.8 % (ref 0–5)
CREAT SERPL-MCNC: 0.39 MG/DL (ref 0.57–1)
CREAT SERPL-MCNC: 0.46 MG/DL (ref 0.57–1)
DEPRECATED RDW RBC AUTO: 36.9 FL (ref 37–54)
DEPRECATED RDW RBC AUTO: 37.5 FL (ref 37–54)
ERYTHROCYTE [DISTWIDTH] IN BLOOD BY AUTOMATED COUNT: 12.7 % (ref 12.3–15.4)
ERYTHROCYTE [DISTWIDTH] IN BLOOD BY AUTOMATED COUNT: 12.7 % (ref 12.3–15.4)
GFR SERPL CREATININE-BSD FRML MDRD: 144 ML/MIN/1.73
GFR SERPL CREATININE-BSD FRML MDRD: >150 ML/MIN/1.73
GLUCOSE BLDC GLUCOMTR-MCNC: 100 MG/DL (ref 70–130)
GLUCOSE BLDC GLUCOMTR-MCNC: 100 MG/DL (ref 70–130)
GLUCOSE BLDC GLUCOMTR-MCNC: 131 MG/DL (ref 70–130)
GLUCOSE BLDC GLUCOMTR-MCNC: 159 MG/DL (ref 70–130)
GLUCOSE BLDC GLUCOMTR-MCNC: 175 MG/DL (ref 70–130)
GLUCOSE BLDC GLUCOMTR-MCNC: 184 MG/DL (ref 70–130)
GLUCOSE BLDC GLUCOMTR-MCNC: 193 MG/DL (ref 70–130)
GLUCOSE BLDC GLUCOMTR-MCNC: 97 MG/DL (ref 70–130)
GLUCOSE SERPL-MCNC: 160 MG/DL (ref 65–99)
GLUCOSE SERPL-MCNC: 170 MG/DL (ref 65–99)
HCO3 BLDA-SCNC: 24.1 MMOL/L (ref 20–26)
HCO3 BLDA-SCNC: 25.8 MMOL/L (ref 20–26)
HCO3 BLDA-SCNC: 26.1 MMOL/L (ref 20–26)
HCO3 BLDA-SCNC: 26.1 MMOL/L (ref 20–26)
HCO3 BLDA-SCNC: 26.3 MMOL/L (ref 20–26)
HCO3 BLDA-SCNC: 26.3 MMOL/L (ref 20–26)
HCO3 BLDA-SCNC: 27.1 MMOL/L (ref 20–26)
HCO3 BLDA-SCNC: 27.7 MMOL/L (ref 20–26)
HCO3 BLDA-SCNC: 29.1 MMOL/L (ref 20–26)
HCT VFR BLD AUTO: 31.2 % (ref 34–46.6)
HCT VFR BLD AUTO: 33.2 % (ref 34–46.6)
HCT VFR BLD CALC: 26.4 % (ref 38–51)
HCT VFR BLD CALC: 26.9 % (ref 38–51)
HCT VFR BLD CALC: 27.3 % (ref 38–51)
HCT VFR BLD CALC: 28.6 % (ref 38–51)
HCT VFR BLD CALC: 35.1 % (ref 38–51)
HCT VFR BLD CALC: 36.1 % (ref 38–51)
HGB BLD-MCNC: 10.5 G/DL (ref 12–15.9)
HGB BLD-MCNC: 11.1 G/DL (ref 12–15.9)
HGB BLDA-MCNC: 11.5 G/DL (ref 12–16)
HGB BLDA-MCNC: 11.8 G/DL (ref 12–16)
HGB BLDA-MCNC: 8.6 G/DL (ref 12–16)
HGB BLDA-MCNC: 8.8 G/DL (ref 12–16)
HGB BLDA-MCNC: 8.9 G/DL (ref 12–16)
HGB BLDA-MCNC: 9.3 G/DL (ref 12–16)
HOLD SPECIMEN: NORMAL
INHALED O2 CONCENTRATION: 100 %
INHALED O2 CONCENTRATION: 30 %
INHALED O2 CONCENTRATION: 45 %
INHALED O2 CONCENTRATION: 55 %
INHALED O2 CONCENTRATION: 90 %
INR PPP: 1.21 (ref 0.91–1.09)
LEFT ATRIUM VOLUME INDEX: 54 ML/M2
Lab: ABNORMAL
Lab: NORMAL
Lab: NORMAL
MAXIMAL PREDICTED HEART RATE: 170 BPM
MCH RBC QN AUTO: 27 PG (ref 26.6–33)
MCH RBC QN AUTO: 27.3 PG (ref 26.6–33)
MCHC RBC AUTO-ENTMCNC: 33.4 G/DL (ref 31.5–35.7)
MCHC RBC AUTO-ENTMCNC: 33.7 G/DL (ref 31.5–35.7)
MCV RBC AUTO: 80.8 FL (ref 79–97)
MCV RBC AUTO: 81.3 FL (ref 79–97)
METHGB BLD QL: 0.4 % (ref 0–3)
METHGB BLD QL: 0.7 % (ref 0–3)
METHGB BLD QL: 1.1 % (ref 0–3)
METHGB BLD QL: 1.4 % (ref 0–3)
MODALITY: ABNORMAL
NOTE: ABNORMAL
OXYHGB MFR BLDV: 97.8 % (ref 94–99)
OXYHGB MFR BLDV: 98.1 % (ref 94–99)
OXYHGB MFR BLDV: 98.1 % (ref 94–99)
OXYHGB MFR BLDV: 98.2 % (ref 94–99)
OXYHGB MFR BLDV: 98.2 % (ref 94–99)
OXYHGB MFR BLDV: 98.4 % (ref 94–99)
PCO2 BLDA: 30.6 MM HG (ref 35–45)
PCO2 BLDA: 37.1 MM HG (ref 35–45)
PCO2 BLDA: 37.7 MM HG (ref 35–45)
PCO2 BLDA: 38.1 MM HG (ref 35–45)
PCO2 BLDA: 40.4 MM HG (ref 35–45)
PCO2 BLDA: 40.5 MM HG (ref 35–45)
PCO2 BLDA: 41.4 MM HG (ref 35–45)
PCO2 BLDA: 45.6 MM HG (ref 35–45)
PCO2 BLDA: 47.4 MM HG (ref 35–45)
PCO2 TEMP ADJ BLD: 30.6 MM HG (ref 35–45)
PCO2 TEMP ADJ BLD: 37.1 MM HG (ref 35–45)
PCO2 TEMP ADJ BLD: 37.7 MM HG (ref 35–45)
PCO2 TEMP ADJ BLD: 38.1 MM HG (ref 35–45)
PCO2 TEMP ADJ BLD: 40.4 MM HG (ref 35–45)
PCO2 TEMP ADJ BLD: 40.5 MM HG (ref 35–45)
PCO2 TEMP ADJ BLD: 41.4 MM HG (ref 35–45)
PCO2 TEMP ADJ BLD: 45.6 MM HG (ref 35–45)
PCO2 TEMP ADJ BLD: 47.4 MM HG (ref 35–45)
PEEP RESPIRATORY: 5 CM[H2O]
PEEP RESPIRATORY: 8 CM[H2O]
PEEP RESPIRATORY: 8 CM[H2O]
PH BLDA: 7.35 PH UNITS (ref 7.35–7.45)
PH BLDA: 7.36 PH UNITS (ref 7.35–7.45)
PH BLDA: 7.42 PH UNITS (ref 7.35–7.45)
PH BLDA: 7.42 PH UNITS (ref 7.35–7.45)
PH BLDA: 7.44 PH UNITS (ref 7.35–7.45)
PH BLDA: 7.45 PH UNITS (ref 7.35–7.45)
PH BLDA: 7.46 PH UNITS (ref 7.35–7.45)
PH BLDA: 7.49 PH UNITS (ref 7.35–7.45)
PH BLDA: 7.5 PH UNITS (ref 7.35–7.45)
PH, TEMP CORRECTED: 7.35 PH UNITS (ref 7.35–7.45)
PH, TEMP CORRECTED: 7.36 PH UNITS (ref 7.35–7.45)
PH, TEMP CORRECTED: 7.42 PH UNITS (ref 7.35–7.45)
PH, TEMP CORRECTED: 7.42 PH UNITS (ref 7.35–7.45)
PH, TEMP CORRECTED: 7.44 PH UNITS (ref 7.35–7.45)
PH, TEMP CORRECTED: 7.45 PH UNITS (ref 7.35–7.45)
PH, TEMP CORRECTED: 7.46 PH UNITS (ref 7.35–7.45)
PH, TEMP CORRECTED: 7.49 PH UNITS (ref 7.35–7.45)
PH, TEMP CORRECTED: 7.5 PH UNITS (ref 7.35–7.45)
PHOSPHATE SERPL-MCNC: 3.6 MG/DL (ref 2.5–4.5)
PHOSPHATE SERPL-MCNC: 4 MG/DL (ref 2.5–4.5)
PLATELET # BLD AUTO: 260 10*3/MM3 (ref 140–450)
PLATELET # BLD AUTO: 300 10*3/MM3 (ref 140–450)
PMV BLD AUTO: 10.1 FL (ref 6–12)
PMV BLD AUTO: 10.2 FL (ref 6–12)
PO2 BLDA: 101 MM HG (ref 83–108)
PO2 BLDA: 130 MM HG (ref 83–108)
PO2 BLDA: 220 MM HG (ref 83–108)
PO2 BLDA: 384 MM HG (ref 83–108)
PO2 BLDA: 444 MM HG (ref 83–108)
PO2 BLDA: 477 MM HG (ref 83–108)
PO2 BLDA: 480 MM HG (ref 83–108)
PO2 BLDA: 494 MM HG (ref 83–108)
PO2 BLDA: 82.9 MM HG (ref 83–108)
PO2 TEMP ADJ BLD: 101 MM HG (ref 83–108)
PO2 TEMP ADJ BLD: 130 MM HG (ref 83–108)
PO2 TEMP ADJ BLD: 220 MM HG (ref 83–108)
PO2 TEMP ADJ BLD: 384 MM HG (ref 83–108)
PO2 TEMP ADJ BLD: 444 MM HG (ref 83–108)
PO2 TEMP ADJ BLD: 477 MM HG (ref 83–108)
PO2 TEMP ADJ BLD: 480 MM HG (ref 83–108)
PO2 TEMP ADJ BLD: 494 MM HG (ref 83–108)
PO2 TEMP ADJ BLD: 82.9 MM HG (ref 83–108)
POTASSIUM BLDA-SCNC: 3.4 MMOL/L (ref 3.5–5.2)
POTASSIUM BLDA-SCNC: 3.9 MMOL/L (ref 3.5–5.2)
POTASSIUM BLDA-SCNC: 4.3 MMOL/L (ref 3.5–5.2)
POTASSIUM BLDA-SCNC: 4.5 MMOL/L (ref 3.5–5.2)
POTASSIUM SERPL-SCNC: 3.8 MMOL/L (ref 3.5–5.2)
POTASSIUM SERPL-SCNC: 4.5 MMOL/L (ref 3.5–5.2)
PROTHROMBIN TIME: 14.9 SECONDS (ref 11.9–14.6)
PSV: 10 CMH2O
RBC # BLD AUTO: 3.84 10*6/MM3 (ref 3.77–5.28)
RBC # BLD AUTO: 4.11 10*6/MM3 (ref 3.77–5.28)
SAO2 % BLDCOA: 100 % (ref 94–99)
SAO2 % BLDCOA: 100 % (ref 94–99)
SAO2 % BLDCOA: 96.7 % (ref 94–99)
SAO2 % BLDCOA: 97.5 % (ref 94–99)
SAO2 % BLDCOA: 98.8 % (ref 94–99)
SAO2 % BLDCOA: 98.9 % (ref 94–99)
SAO2 % BLDCOA: 99.2 % (ref 94–99)
SAO2 % BLDCOA: 99.9 % (ref 94–99)
SAO2 % BLDCOA: 99.9 % (ref 94–99)
SET MECH RESP RATE: 10
SET MECH RESP RATE: 10
SODIUM BLDA-SCNC: 140 MMOL/L (ref 136–145)
SODIUM BLDA-SCNC: 142 MMOL/L (ref 136–145)
SODIUM BLDA-SCNC: 143 MMOL/L (ref 136–145)
SODIUM BLDA-SCNC: 144 MMOL/L (ref 136–145)
SODIUM SERPL-SCNC: 139 MMOL/L (ref 136–145)
SODIUM SERPL-SCNC: 142 MMOL/L (ref 136–145)
STRESS TARGET HR: 145 BPM
VENTILATOR MODE: ABNORMAL
VENTILATOR MODE: AC
VT ON VENT VENT: 650 ML
VT ON VENT VENT: 650 ML
WBC # BLD AUTO: 8.06 10*3/MM3 (ref 3.4–10.8)
WBC # BLD AUTO: 9.52 10*3/MM3 (ref 3.4–10.8)
WHOLE BLOOD HOLD SPECIMEN: NORMAL

## 2021-03-19 PROCEDURE — C1713 ANCHOR/SCREW BN/BN,TIS/BN: HCPCS | Performed by: THORACIC SURGERY (CARDIOTHORACIC VASCULAR SURGERY)

## 2021-03-19 PROCEDURE — 5A1221Z PERFORMANCE OF CARDIAC OUTPUT, CONTINUOUS: ICD-10-PCS | Performed by: THORACIC SURGERY (CARDIOTHORACIC VASCULAR SURGERY)

## 2021-03-19 PROCEDURE — 85610 PROTHROMBIN TIME: CPT | Performed by: THORACIC SURGERY (CARDIOTHORACIC VASCULAR SURGERY)

## 2021-03-19 PROCEDURE — 93312 ECHO TRANSESOPHAGEAL: CPT | Performed by: INTERNAL MEDICINE

## 2021-03-19 PROCEDURE — 25010000002 MANNITOL PER 50 ML

## 2021-03-19 PROCEDURE — 25010000002 PHENYLEPHRINE 10 MG/ML SOLUTION 1 ML VIAL: Performed by: NURSE ANESTHETIST, CERTIFIED REGISTERED

## 2021-03-19 PROCEDURE — 94799 UNLISTED PULMONARY SVC/PX: CPT

## 2021-03-19 PROCEDURE — 25010000002 MIDAZOLAM PER 1 MG: Performed by: NURSE ANESTHETIST, CERTIFIED REGISTERED

## 2021-03-19 PROCEDURE — 33518 CABG ARTERY-VEIN TWO: CPT | Performed by: THORACIC SURGERY (CARDIOTHORACIC VASCULAR SURGERY)

## 2021-03-19 PROCEDURE — 85730 THROMBOPLASTIN TIME PARTIAL: CPT | Performed by: THORACIC SURGERY (CARDIOTHORACIC VASCULAR SURGERY)

## 2021-03-19 PROCEDURE — 82330 ASSAY OF CALCIUM: CPT

## 2021-03-19 PROCEDURE — 5A2204Z RESTORATION OF CARDIAC RHYTHM, SINGLE: ICD-10-PCS | Performed by: THORACIC SURGERY (CARDIOTHORACIC VASCULAR SURGERY)

## 2021-03-19 PROCEDURE — 25010000002 VANCOMYCIN 1 G RECONSTITUTED SOLUTION: Performed by: NURSE ANESTHETIST, CERTIFIED REGISTERED

## 2021-03-19 PROCEDURE — 25010000002 PAPAVERINE PER 60 MG: Performed by: THORACIC SURGERY (CARDIOTHORACIC VASCULAR SURGERY)

## 2021-03-19 PROCEDURE — 25010000002 PHENYLEPHRINE HCL 0.8 MG/10ML SOLUTION PREFILLED SYRINGE: Performed by: NURSE ANESTHETIST, CERTIFIED REGISTERED

## 2021-03-19 PROCEDURE — 25010000003 POTASSIUM CHLORIDE PER 2 MEQ: Performed by: THORACIC SURGERY (CARDIOTHORACIC VASCULAR SURGERY)

## 2021-03-19 PROCEDURE — 33533 CABG ARTERIAL SINGLE: CPT | Performed by: THORACIC SURGERY (CARDIOTHORACIC VASCULAR SURGERY)

## 2021-03-19 PROCEDURE — 02HQ32Z INSERTION OF MONITORING DEVICE INTO RIGHT PULMONARY ARTERY, PERCUTANEOUS APPROACH: ICD-10-PCS | Performed by: ANESTHESIOLOGY

## 2021-03-19 PROCEDURE — 25010000002 MIDAZOLAM PER 1 MG

## 2021-03-19 PROCEDURE — 83050 HGB METHEMOGLOBIN QUAN: CPT

## 2021-03-19 PROCEDURE — 25010000002 VANCOMYCIN 10 G RECONSTITUTED SOLUTION: Performed by: THORACIC SURGERY (CARDIOTHORACIC VASCULAR SURGERY)

## 2021-03-19 PROCEDURE — 94640 AIRWAY INHALATION TREATMENT: CPT

## 2021-03-19 PROCEDURE — 25010000002 HEPARIN (PORCINE) PER 1000 UNITS: Performed by: NURSE ANESTHETIST, CERTIFIED REGISTERED

## 2021-03-19 PROCEDURE — 25010000002 HEPARIN (PORCINE) PER 1000 UNITS

## 2021-03-19 PROCEDURE — 25010000002 MORPHINE SULFATE (PF) 2 MG/ML SOLUTION: Performed by: THORACIC SURGERY (CARDIOTHORACIC VASCULAR SURGERY)

## 2021-03-19 PROCEDURE — 25010000002 PROTAMINE SULFATE PER 10 MG: Performed by: NURSE ANESTHETIST, CERTIFIED REGISTERED

## 2021-03-19 PROCEDURE — B24BZZ4 ULTRASONOGRAPHY OF HEART WITH AORTA, TRANSESOPHAGEAL: ICD-10-PCS | Performed by: THORACIC SURGERY (CARDIOTHORACIC VASCULAR SURGERY)

## 2021-03-19 PROCEDURE — 82375 ASSAY CARBOXYHB QUANT: CPT

## 2021-03-19 PROCEDURE — 82805 BLOOD GASES W/O2 SATURATION: CPT

## 2021-03-19 PROCEDURE — 06BQ0ZZ EXCISION OF LEFT SAPHENOUS VEIN, OPEN APPROACH: ICD-10-PCS | Performed by: THORACIC SURGERY (CARDIOTHORACIC VASCULAR SURGERY)

## 2021-03-19 PROCEDURE — A4648 IMPLANTABLE TISSUE MARKER: HCPCS | Performed by: THORACIC SURGERY (CARDIOTHORACIC VASCULAR SURGERY)

## 2021-03-19 PROCEDURE — 25010000002 AMIODARONE IN DEXTROSE 5% 360-4.14 MG/200ML-% SOLUTION: Performed by: THORACIC SURGERY (CARDIOTHORACIC VASCULAR SURGERY)

## 2021-03-19 PROCEDURE — 25010000002 CEFAZOLIN PER 500 MG: Performed by: NURSE PRACTITIONER

## 2021-03-19 PROCEDURE — 25010000002 PHENYLEPHRINE 10 MG/ML SOLUTION

## 2021-03-19 PROCEDURE — 85027 COMPLETE CBC AUTOMATED: CPT | Performed by: THORACIC SURGERY (CARDIOTHORACIC VASCULAR SURGERY)

## 2021-03-19 PROCEDURE — 93005 ELECTROCARDIOGRAM TRACING: CPT | Performed by: THORACIC SURGERY (CARDIOTHORACIC VASCULAR SURGERY)

## 2021-03-19 PROCEDURE — 3E080GC INTRODUCTION OF OTHER THERAPEUTIC SUBSTANCE INTO HEART, OPEN APPROACH: ICD-10-PCS | Performed by: THORACIC SURGERY (CARDIOTHORACIC VASCULAR SURGERY)

## 2021-03-19 PROCEDURE — 25010000002 CEFAZOLIN PER 500 MG: Performed by: THORACIC SURGERY (CARDIOTHORACIC VASCULAR SURGERY)

## 2021-03-19 PROCEDURE — 25010000002 VANCOMYCIN 1 G RECONSTITUTED SOLUTION: Performed by: THORACIC SURGERY (CARDIOTHORACIC VASCULAR SURGERY)

## 2021-03-19 PROCEDURE — 94002 VENT MGMT INPAT INIT DAY: CPT

## 2021-03-19 PROCEDURE — 82962 GLUCOSE BLOOD TEST: CPT

## 2021-03-19 PROCEDURE — 93325 DOPPLER ECHO COLOR FLOW MAPG: CPT | Performed by: INTERNAL MEDICINE

## 2021-03-19 PROCEDURE — 25010000002 PROPRANOLOL PER 1 MG: Performed by: NURSE ANESTHETIST, CERTIFIED REGISTERED

## 2021-03-19 PROCEDURE — 021109W BYPASS CORONARY ARTERY, TWO ARTERIES FROM AORTA WITH AUTOLOGOUS VENOUS TISSUE, OPEN APPROACH: ICD-10-PCS | Performed by: THORACIC SURGERY (CARDIOTHORACIC VASCULAR SURGERY)

## 2021-03-19 PROCEDURE — 25010000002 FUROSEMIDE PER 20 MG

## 2021-03-19 PROCEDURE — 93318 ECHO TRANSESOPHAGEAL INTRAOP: CPT | Performed by: NURSE ANESTHETIST, CERTIFIED REGISTERED

## 2021-03-19 PROCEDURE — 25010000002 PROTAMINE SULFATE PER 10 MG

## 2021-03-19 PROCEDURE — 02100Z9 BYPASS CORONARY ARTERY, ONE ARTERY FROM LEFT INTERNAL MAMMARY, OPEN APPROACH: ICD-10-PCS | Performed by: THORACIC SURGERY (CARDIOTHORACIC VASCULAR SURGERY)

## 2021-03-19 PROCEDURE — 06BP0ZZ EXCISION OF RIGHT SAPHENOUS VEIN, OPEN APPROACH: ICD-10-PCS | Performed by: THORACIC SURGERY (CARDIOTHORACIC VASCULAR SURGERY)

## 2021-03-19 PROCEDURE — 25010000002 HEPARIN (PORCINE) PER 1000 UNITS: Performed by: THORACIC SURGERY (CARDIOTHORACIC VASCULAR SURGERY)

## 2021-03-19 PROCEDURE — C1751 CATH, INF, PER/CENT/MIDLINE: HCPCS | Performed by: NURSE ANESTHETIST, CERTIFIED REGISTERED

## 2021-03-19 PROCEDURE — 82803 BLOOD GASES ANY COMBINATION: CPT

## 2021-03-19 PROCEDURE — 25810000003 DEXTROSE 5 % WITH KCL 20 MEQ 20-5 MEQ/L-% SOLUTION: Performed by: THORACIC SURGERY (CARDIOTHORACIC VASCULAR SURGERY)

## 2021-03-19 PROCEDURE — 80069 RENAL FUNCTION PANEL: CPT | Performed by: THORACIC SURGERY (CARDIOTHORACIC VASCULAR SURGERY)

## 2021-03-19 PROCEDURE — 03HY32Z INSERTION OF MONITORING DEVICE INTO UPPER ARTERY, PERCUTANEOUS APPROACH: ICD-10-PCS | Performed by: ANESTHESIOLOGY

## 2021-03-19 PROCEDURE — 93010 ELECTROCARDIOGRAM REPORT: CPT | Performed by: INTERNAL MEDICINE

## 2021-03-19 PROCEDURE — 25010000003 METHYLENE BLUE 50 MG/10ML SOLUTION: Performed by: THORACIC SURGERY (CARDIOTHORACIC VASCULAR SURGERY)

## 2021-03-19 PROCEDURE — 25010000002 POTASSIUM CHLORIDE PER 2 MEQ OF POTASSIUM

## 2021-03-19 PROCEDURE — 71045 X-RAY EXAM CHEST 1 VIEW: CPT

## 2021-03-19 DEVICE — SUT MF STL K60 SZ5 18X6IN PK/6: Type: IMPLANTABLE DEVICE | Site: STERNUM | Status: FUNCTIONAL

## 2021-03-19 DEVICE — DISK-SHAPED STYLE, SILICONE (1 PER STERILE PKG)
Type: IMPLANTABLE DEVICE | Site: AORTA | Status: FUNCTIONAL
Brand: SCANLAN® RADIOMARK® GRAFT MARKERS

## 2021-03-19 DEVICE — WR SUT NONABS MF SS CCS 1/2CIR CUT/CONV 5/0 18IN M657G: Type: IMPLANTABLE DEVICE | Site: STERNUM | Status: FUNCTIONAL

## 2021-03-19 DEVICE — PLEDGET INCISIONLINE REINF TFE SFT PTFE 1.5X3X7MM WHT: Type: IMPLANTABLE DEVICE | Site: HEART | Status: FUNCTIONAL

## 2021-03-19 DEVICE — SUT STL 2/0 CV SH 24IN TPW32: Type: IMPLANTABLE DEVICE | Site: HEART | Status: FUNCTIONAL

## 2021-03-19 RX ORDER — MIDAZOLAM HYDROCHLORIDE 1 MG/ML
INJECTION INTRAMUSCULAR; INTRAVENOUS
Status: COMPLETED
Start: 2021-03-19 | End: 2021-03-19

## 2021-03-19 RX ORDER — MORPHINE SULFATE 2 MG/ML
2 INJECTION, SOLUTION INTRAMUSCULAR; INTRAVENOUS
Status: DISCONTINUED | OUTPATIENT
Start: 2021-03-19 | End: 2021-03-20

## 2021-03-19 RX ORDER — SODIUM CHLORIDE, SODIUM LACTATE, POTASSIUM CHLORIDE, CALCIUM CHLORIDE 600; 310; 30; 20 MG/100ML; MG/100ML; MG/100ML; MG/100ML
1000 INJECTION, SOLUTION INTRAVENOUS CONTINUOUS
Status: DISCONTINUED | OUTPATIENT
Start: 2021-03-19 | End: 2021-03-19 | Stop reason: HOSPADM

## 2021-03-19 RX ORDER — DEXTROSE MONOHYDRATE 25 G/50ML
25-50 INJECTION, SOLUTION INTRAVENOUS
Status: DISCONTINUED | OUTPATIENT
Start: 2021-03-19 | End: 2021-03-20 | Stop reason: SDUPTHER

## 2021-03-19 RX ORDER — SODIUM CHLORIDE 9 MG/ML
INJECTION, SOLUTION INTRAVENOUS AS NEEDED
Status: DISCONTINUED | OUTPATIENT
Start: 2021-03-19 | End: 2021-03-19 | Stop reason: HOSPADM

## 2021-03-19 RX ORDER — MIDAZOLAM HYDROCHLORIDE 1 MG/ML
1 INJECTION INTRAMUSCULAR; INTRAVENOUS
Status: DISCONTINUED | OUTPATIENT
Start: 2021-03-19 | End: 2021-03-19 | Stop reason: HOSPADM

## 2021-03-19 RX ORDER — SODIUM CHLORIDE, SODIUM LACTATE, POTASSIUM CHLORIDE, CALCIUM CHLORIDE 600; 310; 30; 20 MG/100ML; MG/100ML; MG/100ML; MG/100ML
100 INJECTION, SOLUTION INTRAVENOUS CONTINUOUS
Status: DISCONTINUED | OUTPATIENT
Start: 2021-03-19 | End: 2021-03-19 | Stop reason: HOSPADM

## 2021-03-19 RX ORDER — MEPERIDINE HYDROCHLORIDE 50 MG/ML
25 INJECTION INTRAMUSCULAR; INTRAVENOUS; SUBCUTANEOUS
Status: ACTIVE | OUTPATIENT
Start: 2021-03-19 | End: 2021-03-20

## 2021-03-19 RX ORDER — CHLORHEXIDINE GLUCONATE 0.12 MG/ML
15 RINSE ORAL EVERY 12 HOURS SCHEDULED
Status: DISCONTINUED | OUTPATIENT
Start: 2021-03-19 | End: 2021-03-19 | Stop reason: SDUPTHER

## 2021-03-19 RX ORDER — VANCOMYCIN HYDROCHLORIDE 1 G/20ML
INJECTION, POWDER, LYOPHILIZED, FOR SOLUTION INTRAVENOUS AS NEEDED
Status: DISCONTINUED | OUTPATIENT
Start: 2021-03-19 | End: 2021-03-19 | Stop reason: SURG

## 2021-03-19 RX ORDER — NEOSTIGMINE METHYLSULFATE 5 MG/5 ML
SYRINGE (ML) INTRAVENOUS AS NEEDED
Status: DISCONTINUED | OUTPATIENT
Start: 2021-03-19 | End: 2021-03-19 | Stop reason: SURG

## 2021-03-19 RX ORDER — LIDOCAINE HYDROCHLORIDE 10 MG/ML
0.5 INJECTION, SOLUTION EPIDURAL; INFILTRATION; INTRACAUDAL; PERINEURAL ONCE AS NEEDED
Status: DISCONTINUED | OUTPATIENT
Start: 2021-03-19 | End: 2021-03-19 | Stop reason: HOSPADM

## 2021-03-19 RX ORDER — MAGNESIUM HYDROXIDE 1200 MG/15ML
LIQUID ORAL AS NEEDED
Status: DISCONTINUED | OUTPATIENT
Start: 2021-03-19 | End: 2021-03-19 | Stop reason: HOSPADM

## 2021-03-19 RX ORDER — POTASSIUM CHLORIDE 29.8 MG/ML
20 INJECTION INTRAVENOUS
Status: COMPLETED | OUTPATIENT
Start: 2021-03-19 | End: 2021-03-19

## 2021-03-19 RX ORDER — HEPARIN SODIUM 1000 [USP'U]/ML
INJECTION, SOLUTION INTRAVENOUS; SUBCUTANEOUS AS NEEDED
Status: DISCONTINUED | OUTPATIENT
Start: 2021-03-19 | End: 2021-03-19 | Stop reason: SURG

## 2021-03-19 RX ORDER — SODIUM CHLORIDE 0.9 % (FLUSH) 0.9 %
3-10 SYRINGE (ML) INJECTION AS NEEDED
Status: DISCONTINUED | OUTPATIENT
Start: 2021-03-19 | End: 2021-03-19 | Stop reason: HOSPADM

## 2021-03-19 RX ORDER — ATORVASTATIN CALCIUM 10 MG/1
20 TABLET, FILM COATED ORAL NIGHTLY
Status: DISCONTINUED | OUTPATIENT
Start: 2021-03-20 | End: 2021-03-24 | Stop reason: HOSPADM

## 2021-03-19 RX ORDER — SODIUM CHLORIDE 9 MG/ML
INJECTION, SOLUTION INTRAVENOUS CONTINUOUS PRN
Status: DISCONTINUED | OUTPATIENT
Start: 2021-03-19 | End: 2021-03-19 | Stop reason: SURG

## 2021-03-19 RX ORDER — SODIUM CHLORIDE 0.9 % (FLUSH) 0.9 %
3 SYRINGE (ML) INJECTION EVERY 12 HOURS SCHEDULED
Status: DISCONTINUED | OUTPATIENT
Start: 2021-03-19 | End: 2021-03-19 | Stop reason: HOSPADM

## 2021-03-19 RX ORDER — POTASSIUM CHLORIDE, DEXTROSE MONOHYDRATE 150; 5 MG/100ML; G/100ML
30 INJECTION, SOLUTION INTRAVENOUS CONTINUOUS
Status: DISCONTINUED | OUTPATIENT
Start: 2021-03-19 | End: 2021-03-22

## 2021-03-19 RX ORDER — ONDANSETRON 2 MG/ML
4 INJECTION INTRAMUSCULAR; INTRAVENOUS EVERY 6 HOURS PRN
Status: DISCONTINUED | OUTPATIENT
Start: 2021-03-19 | End: 2021-03-24 | Stop reason: HOSPADM

## 2021-03-19 RX ORDER — VECURONIUM BROMIDE 1 MG/ML
INJECTION, POWDER, LYOPHILIZED, FOR SOLUTION INTRAVENOUS AS NEEDED
Status: DISCONTINUED | OUTPATIENT
Start: 2021-03-19 | End: 2021-03-19 | Stop reason: SURG

## 2021-03-19 RX ORDER — MIDAZOLAM HYDROCHLORIDE 1 MG/ML
INJECTION INTRAMUSCULAR; INTRAVENOUS AS NEEDED
Status: DISCONTINUED | OUTPATIENT
Start: 2021-03-19 | End: 2021-03-19 | Stop reason: SURG

## 2021-03-19 RX ORDER — DEXMEDETOMIDINE HYDROCHLORIDE 4 UG/ML
.2-1.5 INJECTION, SOLUTION INTRAVENOUS
Status: DISCONTINUED | OUTPATIENT
Start: 2021-03-19 | End: 2021-03-22

## 2021-03-19 RX ORDER — NITROGLYCERIN 20 MG/100ML
5 INJECTION INTRAVENOUS CONTINUOUS
Status: DISCONTINUED | OUTPATIENT
Start: 2021-03-19 | End: 2021-03-22

## 2021-03-19 RX ORDER — PHENYLEPHRINE HCL IN 0.9% NACL 0.8MG/10ML
SYRINGE (ML) INTRAVENOUS AS NEEDED
Status: DISCONTINUED | OUTPATIENT
Start: 2021-03-19 | End: 2021-03-19 | Stop reason: SURG

## 2021-03-19 RX ORDER — BUPIVACAINE HCL/0.9 % NACL/PF 0.1 %
2 PLASTIC BAG, INJECTION (ML) EPIDURAL ONCE
Status: COMPLETED | OUTPATIENT
Start: 2021-03-19 | End: 2021-03-19

## 2021-03-19 RX ORDER — BUPIVACAINE HCL/0.9 % NACL/PF 0.1 %
2 PLASTIC BAG, INJECTION (ML) EPIDURAL EVERY 8 HOURS
Status: COMPLETED | OUTPATIENT
Start: 2021-03-19 | End: 2021-03-21

## 2021-03-19 RX ORDER — MIDAZOLAM HYDROCHLORIDE 1 MG/ML
2 INJECTION INTRAMUSCULAR; INTRAVENOUS
Status: DISCONTINUED | OUTPATIENT
Start: 2021-03-19 | End: 2021-03-19 | Stop reason: HOSPADM

## 2021-03-19 RX ORDER — ASPIRIN 81 MG/1
162 TABLET, CHEWABLE ORAL ONCE
Status: COMPLETED | OUTPATIENT
Start: 2021-03-20 | End: 2021-03-20

## 2021-03-19 RX ORDER — AMINOCAPROIC ACID 250 MG/ML
INJECTION, SOLUTION INTRAVENOUS AS NEEDED
Status: DISCONTINUED | OUTPATIENT
Start: 2021-03-19 | End: 2021-03-19 | Stop reason: SURG

## 2021-03-19 RX ORDER — SODIUM CHLORIDE 0.9 % (FLUSH) 0.9 %
10 SYRINGE (ML) INJECTION AS NEEDED
Status: DISCONTINUED | OUTPATIENT
Start: 2021-03-19 | End: 2021-03-19 | Stop reason: HOSPADM

## 2021-03-19 RX ORDER — ASPIRIN 81 MG/1
81 TABLET, CHEWABLE ORAL DAILY
Status: DISCONTINUED | OUTPATIENT
Start: 2021-03-21 | End: 2021-03-24 | Stop reason: HOSPADM

## 2021-03-19 RX ORDER — CHLORHEXIDINE GLUCONATE 0.12 MG/ML
15 RINSE ORAL EVERY 12 HOURS
Status: DISCONTINUED | OUTPATIENT
Start: 2021-03-19 | End: 2021-03-21

## 2021-03-19 RX ORDER — ASPIRIN 81 MG/1
81 TABLET ORAL DAILY
Status: DISCONTINUED | OUTPATIENT
Start: 2021-03-21 | End: 2021-03-19 | Stop reason: SDUPTHER

## 2021-03-19 RX ORDER — PROPRANOLOL HYDROCHLORIDE 1 MG/ML
INJECTION, SOLUTION INTRAVENOUS AS NEEDED
Status: DISCONTINUED | OUTPATIENT
Start: 2021-03-19 | End: 2021-03-19 | Stop reason: SURG

## 2021-03-19 RX ORDER — SUFENTANIL CITRATE 50 UG/ML
INJECTION EPIDURAL; INTRAVENOUS AS NEEDED
Status: DISCONTINUED | OUTPATIENT
Start: 2021-03-19 | End: 2021-03-19 | Stop reason: SURG

## 2021-03-19 RX ORDER — SODIUM CHLORIDE, SODIUM LACTATE, POTASSIUM CHLORIDE, CALCIUM CHLORIDE 600; 310; 30; 20 MG/100ML; MG/100ML; MG/100ML; MG/100ML
INJECTION, SOLUTION INTRAVENOUS CONTINUOUS PRN
Status: DISCONTINUED | OUTPATIENT
Start: 2021-03-19 | End: 2021-03-19 | Stop reason: SURG

## 2021-03-19 RX ORDER — PROTAMINE SULFATE 10 MG/ML
INJECTION, SOLUTION INTRAVENOUS AS NEEDED
Status: DISCONTINUED | OUTPATIENT
Start: 2021-03-19 | End: 2021-03-19 | Stop reason: SURG

## 2021-03-19 RX ORDER — ROCURONIUM BROMIDE 10 MG/ML
INJECTION, SOLUTION INTRAVENOUS AS NEEDED
Status: DISCONTINUED | OUTPATIENT
Start: 2021-03-19 | End: 2021-03-19 | Stop reason: SURG

## 2021-03-19 RX ORDER — POTASSIUM CHLORIDE 29.8 MG/ML
20 INJECTION INTRAVENOUS
Status: DISCONTINUED | OUTPATIENT
Start: 2021-03-19 | End: 2021-03-22

## 2021-03-19 RX ORDER — CLOPIDOGREL BISULFATE 75 MG/1
75 TABLET ORAL DAILY
Status: DISCONTINUED | OUTPATIENT
Start: 2021-03-20 | End: 2021-03-24 | Stop reason: HOSPADM

## 2021-03-19 RX ORDER — SODIUM CHLORIDE 0.9 % (FLUSH) 0.9 %
3 SYRINGE (ML) INJECTION AS NEEDED
Status: DISCONTINUED | OUTPATIENT
Start: 2021-03-19 | End: 2021-03-19 | Stop reason: HOSPADM

## 2021-03-19 RX ORDER — ALBUTEROL SULFATE 2.5 MG/3ML
2.5 SOLUTION RESPIRATORY (INHALATION) EVERY 4 HOURS PRN
Status: ACTIVE | OUTPATIENT
Start: 2021-03-19 | End: 2021-03-20

## 2021-03-19 RX ORDER — AMIODARONE HYDROCHLORIDE 200 MG/1
200 TABLET ORAL EVERY 6 HOURS
Status: DISCONTINUED | OUTPATIENT
Start: 2021-03-20 | End: 2021-03-21

## 2021-03-19 RX ORDER — LEVOTHYROXINE SODIUM 0.15 MG/1
150 TABLET ORAL
Status: DISCONTINUED | OUTPATIENT
Start: 2021-03-20 | End: 2021-03-24 | Stop reason: HOSPADM

## 2021-03-19 RX ORDER — NITROGLYCERIN 20 MG/100ML
INJECTION INTRAVENOUS CONTINUOUS PRN
Status: DISCONTINUED | OUTPATIENT
Start: 2021-03-19 | End: 2021-03-19 | Stop reason: SURG

## 2021-03-19 RX ORDER — IPRATROPIUM BROMIDE AND ALBUTEROL SULFATE 2.5; .5 MG/3ML; MG/3ML
3 SOLUTION RESPIRATORY (INHALATION)
Status: DISCONTINUED | OUTPATIENT
Start: 2021-03-19 | End: 2021-03-21

## 2021-03-19 RX ORDER — OXYCODONE AND ACETAMINOPHEN 10; 325 MG/1; MG/1
1 TABLET ORAL
Status: DISCONTINUED | OUTPATIENT
Start: 2021-03-19 | End: 2021-03-23

## 2021-03-19 RX ORDER — OXYCODONE HYDROCHLORIDE AND ACETAMINOPHEN 5; 325 MG/1; MG/1
1 TABLET ORAL
Status: DISCONTINUED | OUTPATIENT
Start: 2021-03-19 | End: 2021-03-24 | Stop reason: HOSPADM

## 2021-03-19 RX ORDER — VANCOMYCIN HYDROCHLORIDE 1 G/20ML
INJECTION, POWDER, LYOPHILIZED, FOR SOLUTION INTRAVENOUS AS NEEDED
Status: DISCONTINUED | OUTPATIENT
Start: 2021-03-19 | End: 2021-03-19 | Stop reason: HOSPADM

## 2021-03-19 RX ADMIN — Medication 2 G: at 07:58

## 2021-03-19 RX ADMIN — AMINOCAPROIC ACID 1 G/HR: 250 INJECTION, SOLUTION INTRAVENOUS at 07:57

## 2021-03-19 RX ADMIN — SUFENTANIL CITRATE 50 MCG: 50 INJECTION EPIDURAL; INTRAVENOUS at 09:13

## 2021-03-19 RX ADMIN — SODIUM CHLORIDE 5 MG/HR: 9 INJECTION, SOLUTION INTRAVENOUS at 15:36

## 2021-03-19 RX ADMIN — OXYCODONE HYDROCHLORIDE AND ACETAMINOPHEN 1 TABLET: 5; 325 TABLET ORAL at 20:56

## 2021-03-19 RX ADMIN — AMIODARONE HYDROCHLORIDE 1 MG/MIN: 1.8 INJECTION, SOLUTION INTRAVENOUS at 15:30

## 2021-03-19 RX ADMIN — SUFENTANIL CITRATE 150 MCG: 50 INJECTION EPIDURAL; INTRAVENOUS at 12:43

## 2021-03-19 RX ADMIN — IPRATROPIUM BROMIDE AND ALBUTEROL SULFATE 3 ML: 2.5; .5 SOLUTION RESPIRATORY (INHALATION) at 15:27

## 2021-03-19 RX ADMIN — MORPHINE SULFATE 2 MG: 2 INJECTION, SOLUTION INTRAMUSCULAR; INTRAVENOUS at 18:03

## 2021-03-19 RX ADMIN — VANCOMYCIN HYDROCHLORIDE 1500 MG: 1 INJECTION, POWDER, LYOPHILIZED, FOR SOLUTION INTRAVENOUS at 07:58

## 2021-03-19 RX ADMIN — DEXMEDETOMIDINE 0.5 MCG/KG/HR: 100 INJECTION, SOLUTION, CONCENTRATE INTRAVENOUS at 13:56

## 2021-03-19 RX ADMIN — SUFENTANIL CITRATE 50 MCG: 50 INJECTION EPIDURAL; INTRAVENOUS at 13:51

## 2021-03-19 RX ADMIN — PHENYLEPHRINE HYDROCHLORIDE 1 MCG/KG/MIN: 10 INJECTION INTRAVENOUS at 10:01

## 2021-03-19 RX ADMIN — AMINOCAPROIC ACID 5 G: 250 INJECTION, SOLUTION INTRAVENOUS at 07:57

## 2021-03-19 RX ADMIN — Medication 80 MCG: at 09:13

## 2021-03-19 RX ADMIN — NITROGLYCERIN 10 MCG/MIN: 20 INJECTION INTRAVENOUS at 14:10

## 2021-03-19 RX ADMIN — VANCOMYCIN HYDROCHLORIDE 1500 MG: 10 INJECTION, POWDER, LYOPHILIZED, FOR SOLUTION INTRAVENOUS at 20:32

## 2021-03-19 RX ADMIN — OXYCODONE HYDROCHLORIDE AND ACETAMINOPHEN 1 TABLET: 10; 325 TABLET ORAL at 23:43

## 2021-03-19 RX ADMIN — POTASSIUM CHLORIDE AND DEXTROSE MONOHYDRATE 30 ML/HR: 150; 5 INJECTION, SOLUTION INTRAVENOUS at 15:24

## 2021-03-19 RX ADMIN — VECURONIUM BROMIDE 10 MG: 1 INJECTION, POWDER, LYOPHILIZED, FOR SOLUTION INTRAVENOUS at 11:35

## 2021-03-19 RX ADMIN — GLYCOPYRROLATE 0.4 MG: 0.2 INJECTION, SOLUTION INTRAMUSCULAR; INTRAVENOUS at 14:27

## 2021-03-19 RX ADMIN — MIDAZOLAM 5 MG: 1 INJECTION INTRAMUSCULAR; INTRAVENOUS at 12:43

## 2021-03-19 RX ADMIN — SUFENTANIL CITRATE 50 MCG: 50 INJECTION EPIDURAL; INTRAVENOUS at 09:33

## 2021-03-19 RX ADMIN — Medication 2 G: at 13:16

## 2021-03-19 RX ADMIN — SUFENTANIL CITRATE 50 MCG: 50 INJECTION EPIDURAL; INTRAVENOUS at 09:58

## 2021-03-19 RX ADMIN — PROTAMINE SULFATE 150 MG: 10 INJECTION, SOLUTION INTRAVENOUS at 13:14

## 2021-03-19 RX ADMIN — MIDAZOLAM 2 MG: 1 INJECTION INTRAMUSCULAR; INTRAVENOUS at 08:11

## 2021-03-19 RX ADMIN — MIDAZOLAM 2 MG: 1 INJECTION INTRAMUSCULAR; INTRAVENOUS at 07:53

## 2021-03-19 RX ADMIN — Medication 3 MG: at 14:27

## 2021-03-19 RX ADMIN — VECURONIUM BROMIDE 20 MG: 1 INJECTION, POWDER, LYOPHILIZED, FOR SOLUTION INTRAVENOUS at 08:02

## 2021-03-19 RX ADMIN — CEFAZOLIN SODIUM 2 G: 10 INJECTION, POWDER, FOR SOLUTION INTRAVENOUS at 20:32

## 2021-03-19 RX ADMIN — ROCURONIUM BROMIDE 5 MG: 10 INJECTION INTRAVENOUS at 07:55

## 2021-03-19 RX ADMIN — PROPRANOLOL HYDROCHLORIDE 1 MG: 1 INJECTION INTRAVENOUS at 07:54

## 2021-03-19 RX ADMIN — SUFENTANIL CITRATE 50 MCG: 50 INJECTION EPIDURAL; INTRAVENOUS at 09:46

## 2021-03-19 RX ADMIN — SODIUM CHLORIDE, POTASSIUM CHLORIDE, SODIUM LACTATE AND CALCIUM CHLORIDE: 600; 310; 30; 20 INJECTION, SOLUTION INTRAVENOUS at 08:10

## 2021-03-19 RX ADMIN — SUFENTANIL CITRATE 50 MCG: 50 INJECTION EPIDURAL; INTRAVENOUS at 09:37

## 2021-03-19 RX ADMIN — MIDAZOLAM HYDROCHLORIDE 2 MG: 1 INJECTION INTRAMUSCULAR; INTRAVENOUS at 07:26

## 2021-03-19 RX ADMIN — SUFENTANIL CITRATE 50 MCG: 50 INJECTION EPIDURAL; INTRAVENOUS at 09:41

## 2021-03-19 RX ADMIN — MUPIROCIN 1 APPLICATION: 20 OINTMENT TOPICAL at 17:24

## 2021-03-19 RX ADMIN — SUFENTANIL CITRATE 100 MCG: 50 INJECTION EPIDURAL; INTRAVENOUS at 07:55

## 2021-03-19 RX ADMIN — POTASSIUM CHLORIDE 20 MEQ: 29.8 INJECTION, SOLUTION INTRAVENOUS at 16:23

## 2021-03-19 RX ADMIN — IPRATROPIUM BROMIDE AND ALBUTEROL SULFATE 3 ML: 2.5; .5 SOLUTION RESPIRATORY (INHALATION) at 21:40

## 2021-03-19 RX ADMIN — MIDAZOLAM 2 MG: 1 INJECTION INTRAMUSCULAR; INTRAVENOUS at 07:26

## 2021-03-19 RX ADMIN — HEPARIN SODIUM 25000 UNITS: 1000 INJECTION, SOLUTION INTRAVENOUS; SUBCUTANEOUS at 10:36

## 2021-03-19 RX ADMIN — POTASSIUM CHLORIDE AND DEXTROSE MONOHYDRATE 30 ML/HR: 150; 5 INJECTION, SOLUTION INTRAVENOUS at 15:23

## 2021-03-19 RX ADMIN — SODIUM CHLORIDE, POTASSIUM CHLORIDE, SODIUM LACTATE AND CALCIUM CHLORIDE 1000 ML: 600; 310; 30; 20 INJECTION, SOLUTION INTRAVENOUS at 07:38

## 2021-03-19 RX ADMIN — SUFENTANIL CITRATE 50 MCG: 50 INJECTION EPIDURAL; INTRAVENOUS at 09:29

## 2021-03-19 RX ADMIN — MIDAZOLAM 1 MG: 1 INJECTION INTRAMUSCULAR; INTRAVENOUS at 07:55

## 2021-03-19 RX ADMIN — SUFENTANIL CITRATE 50 MCG: 50 INJECTION EPIDURAL; INTRAVENOUS at 09:20

## 2021-03-19 RX ADMIN — MORPHINE SULFATE 2 MG: 2 INJECTION, SOLUTION INTRAMUSCULAR; INTRAVENOUS at 15:39

## 2021-03-19 RX ADMIN — CHLORHEXIDINE GLUCONATE 0.12% ORAL RINSE 15 ML: 1.2 LIQUID ORAL at 17:24

## 2021-03-19 RX ADMIN — VECURONIUM BROMIDE 10 MG: 1 INJECTION, POWDER, LYOPHILIZED, FOR SOLUTION INTRAVENOUS at 12:43

## 2021-03-19 RX ADMIN — SUFENTANIL CITRATE 50 MCG: 50 INJECTION EPIDURAL; INTRAVENOUS at 13:33

## 2021-03-19 RX ADMIN — SODIUM CHLORIDE, POTASSIUM CHLORIDE, SODIUM LACTATE AND CALCIUM CHLORIDE 100 ML/HR: 600; 310; 30; 20 INJECTION, SOLUTION INTRAVENOUS at 07:36

## 2021-03-19 RX ADMIN — SODIUM CHLORIDE: 9 INJECTION, SOLUTION INTRAVENOUS at 08:10

## 2021-03-19 RX ADMIN — AMIODARONE HYDROCHLORIDE 0.5 MG/MIN: 1.8 INJECTION, SOLUTION INTRAVENOUS at 21:30

## 2021-03-19 NOTE — SIGNIFICANT NOTE
Patient arrived from OR to CCU8 approximately 1426. Tube placement 22lip/21teeth confirmed with CRNA 7.5. Vt of 650 confirmed with CRNA, tube stable and secure, pt placed on vent.

## 2021-03-19 NOTE — ANESTHESIA PROCEDURE NOTES
Arterial Line      Patient reassessed immediately prior to procedure    Patient location during procedure: pre-op  Start time: 3/19/2021 7:25 AM  Stop Time:3/19/2021 7:26 AM       Line placed for hemodynamic monitoring, ABGs/Labs/ISTAT and MD/Surgeon request.  Performed By   Anesthesiologist: Sue Smiley MD  Preanesthetic Checklist  Completed: patient identified, IV checked, site marked, risks and benefits discussed, surgical consent, monitors and equipment checked, pre-op evaluation and timeout performed  Arterial Line Prep   Sterile Tech: gloves, cap and mask  Prep: ChloraPrep  Patient monitoring: blood pressure monitoring, continuous pulse oximetry and EKG  Arterial Line Procedure   Laterality:left  Location:  radial artery  Catheter size: 20 G   Guidance: ultrasound guided  Number of attempts: 1  Successful placement: yes  Post Assessment   Dressing Type: occlusive dressing applied, secured with tape and wrist guard applied.   Complications no  Circ/Move/Sens Assessment: normal and unchanged.   Patient Tolerance: patient tolerated the procedure well with no apparent complications

## 2021-03-19 NOTE — ANESTHESIA PROCEDURE NOTES
Peripheral IV    Patient location during procedure: holding area  Start time: 3/19/2021 7:30 AM  End time: 3/19/2021 7:31 AM  Line placed for Difficult Access, Fluids/Medication Admin and Sedation.  Performed By   Anesthesiologist: Sue Smiley MD  Preanesthetic Checklist  Completed: patient identified, IV checked, site marked, risks and benefits discussed, surgical consent, monitors and equipment checked, pre-op evaluation and timeout performed  Peripheral IV Prep   Patient position: supine   Prep: ChloraPrep  Patient monitoring: continuous pulse ox and heart rate  Peripheral IV Procedure   Laterality:left  Location:  Antecubital  Catheter size: 18 G  Guidance: ultrasound guided         Post Assessment   Dressing Type: tape and transparent.    IV Dressing/Site: clean, dry and intact

## 2021-03-19 NOTE — ANESTHESIA PREPROCEDURE EVALUATION
" Anesthesia Evaluation     Patient summary reviewed   no history of anesthetic complications:  NPO Solid Status: > 8 hours  NPO Liquid Status: > 8 hours           Airway   Mallampati: I  TM distance: >3 FB  Neck ROM: full  No difficulty expected  Dental - normal exam     Pulmonary    (+) a smoker Current,   (-) COPD, asthma, sleep apnea  Cardiovascular     (+) hypertension, past MI , CAD, angina, CHF , hyperlipidemia,     ROS comment: Echo:  · Left ventricular ejection fraction appears to be 56 - 60%. Left ventricular systolic function is normal.  · Left ventricular diastolic function is consistent with (grade I) impaired relaxation.  · Left atrial volume is severely increased.  · Estimated right ventricular systolic pressure from tricuspid regurgitation is normal (<35 mmHg).    Neuro/Psych  (-) seizures, TIA, CVA  GI/Hepatic/Renal/Endo    (+) morbid obesity, PUD,  diabetes mellitus using insulin, thyroid problem hypothyroidism  (-) liver disease, no renal disease    Musculoskeletal     Abdominal    Substance History      OB/GYN          Other   arthritis,           From CT H&P:   \"Cardiology was consulted with heart cath planned but placed on hold due to a positive covid test on 3/12. She indicates she remained chest pain free that time. She was given oral vitamin C, D, and zinc. 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 She required an insulin gtt for hyperglycemia during that time. She was retested for covid on 3/16 and tested negative. Cardiac catheterization was completed on 3/17/2021 by Dr. Gavin. Findings identified multivessel coronary artery disease with depressed LV function (formal cath report not available for review). Dr. Gavin contacted Dr. Burgos with decision-making made to transfer her to [facility] for higher level of care. A 3rd covid test was performed prior to transfer and was reported to be negative as well. \"   "     Anesthesia Plan    ASA 4     general   (2 PIV, Tara, CVL/[place], SHARYN)  intravenous induction     Anesthetic plan, all risks, benefits, and alternatives have been provided, discussed and informed consent has been obtained with: patient.  Use of blood products discussed with patient  Consented to blood products.

## 2021-03-19 NOTE — PLAN OF CARE
Goal Outcome Evaluation:         Nutrition: Assessment completed. Pt is currently NPO for sx, average PO intake 100% of 1 meal. RDN will offer DM/heart healthy education once appropriate. A1C 12.80. RDN will continue to follow pt.

## 2021-03-19 NOTE — ANESTHESIA PROCEDURE NOTES
Central Line      Patient reassessed immediately prior to procedure    Patient location during procedure: OR  Start time: 3/19/2021 8:10 AM  Stop Time:3/19/2021 8:15 AM  Indications: vascular access, MD/Surgeon request and central pressure monitoring  Staff  Anesthesiologist: Sue Smiley MD  Preanesthetic Checklist  Completed: patient identified, IV checked, site marked, risks and benefits discussed, surgical consent, monitors and equipment checked, pre-op evaluation and timeout performed  Central Line Prep  Sterile Tech:cap, gloves, gown, mask and sterile barriers  Prep: chloraprep  Patient monitoring: blood pressure monitoring, continuous pulse oximetry and EKG  Central Line Procedure  Laterality:right  Location:internal jugular  Catheter Type:MAC and [place]  Catheter Size:9 Fr  Guidance:ultrasound guided and wire visualized in collapsible vessel prior to dilation  PROCEDURE NOTE/ULTRASOUND INTERPRETATION.  Using ultrasound guidance the potential vascular sites for insertion of the catheter were visualized to determine the patency of the vessel to be used for vascular access.  After selecting the appropriate site for insertion, the needle was visualized under ultrasound being inserted into the internal jugular vein, followed by ultrasound confirmation of wire and catheter placement. There were no abnormalities seen on ultrasound; an image was taken; and the patient tolerated the procedure with no complications.   Assessment  Post procedure:biopatch applied, line sutured and occlusive dressing applied  Assessement:blood return through all ports, chest x-ray ordered and free fluid flow  Complications:no  Patient Tolerance:patient tolerated the procedure well with no apparent complications

## 2021-03-19 NOTE — PLAN OF CARE
Admitted to ICUfollwoing cabg at 1426. Sedation/Paralytic reversed upon arrival. At 1600 began waking, following commands, COSME, PERRLA. Morphine X 2 for pain. NSR. On cardene for short time. UOP 750ml. MST 57 ml. MURALI 85 ml. Prevena wound vac X 3. Lungs clear.   Problem: Adult Inpatient Plan of Care  Goal: Plan of Care Review  3/19/2021 1807 by Malka Carrillo RN  Outcome: Ongoing, Progressing  3/19/2021 1806 by Malka Carrillo RN  Outcome: Ongoing, Progressing  Goal: Patient-Specific Goal (Individualized)  3/19/2021 1807 by Malka Carrillo RN  Outcome: Ongoing, Progressing  3/19/2021 1806 by Malka Carrillo RN  Outcome: Ongoing, Progressing  Goal: Absence of Hospital-Acquired Illness or Injury  3/19/2021 1807 by Malka Carrillo RN  Outcome: Ongoing, Progressing  3/19/2021 1806 by Malka Carrillo RN  Outcome: Ongoing, Progressing  Intervention: Identify and Manage Fall Risk  Recent Flowsheet Documentation  Taken 3/19/2021 1800 by Malka Carrillo RN  Safety Promotion/Fall Prevention:   fall prevention program maintained   safety round/check completed  Taken 3/19/2021 1700 by Malka Carrillo RN  Safety Promotion/Fall Prevention:   fall prevention program maintained   safety round/check completed  Taken 3/19/2021 1600 by Malka Carrillo RN  Safety Promotion/Fall Prevention:   fall prevention program maintained   safety round/check completed  Taken 3/19/2021 1500 by Malka Carrillo RN  Safety Promotion/Fall Prevention:   fall prevention program maintained   safety round/check completed  Taken 3/19/2021 1429 by Malka Carrillo RN  Safety Promotion/Fall Prevention:   fall prevention program maintained   safety round/check completed  Intervention: Prevent Skin Injury  Recent Flowsheet Documentation  Taken 3/19/2021 1800 by Malka Carrillo RN  Body Position:   tilted, right   upper extremity elevated, left   upper extremity elevated, right   lower extremity elevated, left   lower extremity elevated, right  Taken 3/19/2021 1600 by Malka Carrillo RN  Body Position:   tilted, left   upper  extremity elevated, left   upper extremity elevated, right   lower extremity elevated, left   lower extremity elevated, right  Taken 3/19/2021 1429 by Malka Carrillo RN  Body Position: position maintained  Intervention: Prevent and Manage VTE (venous thromboembolism) Risk  Recent Flowsheet Documentation  Taken 3/19/2021 1600 by Malka Carrillo RN  VTE Prevention/Management: compression stockings on  Taken 3/19/2021 1429 by Malka Carrillo RN  VTE Prevention/Management: compression stockings on  Goal: Optimal Comfort and Wellbeing  3/19/2021 1807 by Malka Carrillo RN  Outcome: Ongoing, Progressing  3/19/2021 1806 by Malka Carrillo RN  Outcome: Ongoing, Progressing  Intervention: Provide Person-Centered Care  Recent Flowsheet Documentation  Taken 3/19/2021 1600 by Malka Carrillo RN  Trust Relationship/Rapport:   care explained   thoughts/feelings acknowledged  Taken 3/19/2021 1429 by Malka Carrillo RN  Trust Relationship/Rapport:   care explained   reassurance provided  Goal: Readiness for Transition of Care  3/19/2021 1807 by Malka Carrillo RN  Outcome: Ongoing, Progressing  3/19/2021 1806 by Malka Carrillo RN  Outcome: Ongoing, Progressing     Problem: Adjustment to Illness (Acute Coronary Syndrome)  Goal: Optimal Adaptation to Illness  3/19/2021 1807 by Malka Carrillo RN  Outcome: Ongoing, Progressing  3/19/2021 1806 by Malka Carrillo RN  Outcome: Ongoing, Progressing  Intervention: Support Adjustment to Life-Changing Event  Recent Flowsheet Documentation  Taken 3/19/2021 1600 by Malka Carrillo RN  Family/Support System Care: self-care encouraged     Problem: Arrhythmia/Dysrhythmia (Acute Coronary Syndrome)  Goal: Normalized Cardiac Rhythm  3/19/2021 1807 by Malka Carrillo RN  Outcome: Ongoing, Progressing  3/19/2021 1806 by Malka Carrillo RN  Outcome: Ongoing, Progressing  Intervention: Monitor and Manage Cardiac Rhythm Effects  Recent Flowsheet Documentation  Taken 3/19/2021 1600 by Malka Carrillo RN  VTE Prevention/Management: compression stockings on  Taken 3/19/2021 1429  by Gerardo, Malka, RN  Dysrhythmia Management: other (see comments)  VTE Prevention/Management: compression stockings on     Problem: Cardiac-Related Pain (Acute Coronary Syndrome)  Goal: Absence of Cardiac-Related Pain  3/19/2021 1807 by Malka Carrillo RN  Outcome: Ongoing, Progressing  3/19/2021 1806 by Malka Carrillo RN  Outcome: Ongoing, Progressing     Problem: Hemodynamic Instability (Acute Coronary Syndrome)  Goal: Effective Cardiac Pump Function  3/19/2021 1807 by Malka Carrillo RN  Outcome: Ongoing, Progressing  3/19/2021 1806 by Malka Carrillo RN  Outcome: Ongoing, Progressing  Intervention: Optimize Cardiac Function and Blood Flow  Recent Flowsheet Documentation  Taken 3/19/2021 1600 by Malka Carrillo RN  Fluid/Electrolyte Management: intravenous fluid replacement initiated  Taken 3/19/2021 1429 by Malka Carrillo RN  Fluid/Electrolyte Management: intravenous fluid replacement initiated     Problem: Tissue Perfusion (Acute Coronary Syndrome)  Goal: Adequate Tissue Perfusion  3/19/2021 1807 by Malka Carrillo RN  Outcome: Ongoing, Progressing  3/19/2021 1806 by Malka Carrillo RN  Outcome: Ongoing, Progressing  Intervention: Optimize Cardiac Tissue Perfusion  Recent Flowsheet Documentation  Taken 3/19/2021 1600 by Malka Carrillo RN  Activity Management: bedrest  Taken 3/19/2021 1429 by Malka Carrillo RN  Activity Management: bedrest     Problem: Skin Injury Risk Increased  Goal: Skin Health and Integrity  3/19/2021 1807 by Malka Carrillo RN  Outcome: Ongoing, Progressing  3/19/2021 1806 by Malka Carrillo RN  Outcome: Ongoing, Progressing  Intervention: Optimize Skin Protection  Recent Flowsheet Documentation  Taken 3/19/2021 1800 by Malka Carrillo RN  Head of Bed (HOB): HOB at 30 degrees  Taken 3/19/2021 1600 by Malka Carrillo RN  Pressure Reduction Techniques:   frequent weight shift encouraged   heels elevated off bed   pressure points protected   positioned off wounds   weight shift assistance provided  Head of Bed (HOB): HOB at 30 degrees  Pressure Reduction  Devices: pressure-redistributing mattress utilized  Skin Protection:   adhesive use limited   incontinence pads utilized   skin-to-device areas padded   skin-to-skin areas padded   tubing/devices free from skin contact  Taken 3/19/2021 1429 by Malka Carrillo RN  Head of Bed (HOB): HOB at 30 degrees     Problem: Activity Intolerance (Cardiovascular Surgery)  Goal: Improved Activity Tolerance  Outcome: Ongoing, Progressing     Problem: Adjustment to Surgery (Cardiovascular Surgery)  Goal: Optimal Coping with Heart Surgery  Outcome: Ongoing, Progressing  Intervention: Support Patient/Family Psychosocial Response to Major Surgery  Recent Flowsheet Documentation  Taken 3/19/2021 1600 by Malka Carrillo RN  Family/Support System Care: self-care encouraged     Problem: Bleeding (Cardiovascular Surgery)  Goal: Absence of Bleeding  Outcome: Ongoing, Progressing     Problem: Bowel Elimination Impaired (Cardiovascular Surgery)  Goal: Effective Bowel Elimination  Outcome: Ongoing, Progressing     Problem: Cardiac Function Impaired (Cardiovascular Surgery)  Goal: Effective Cardiac Function  Outcome: Ongoing, Progressing  Intervention: Optimize Cardiac Output and Blood Flow  Recent Flowsheet Documentation  Taken 3/19/2021 1429 by Malka Carrillo RN  Dysrhythmia Management: other (see comments)     Problem: Cerebral Tissue Perfusion Risk (Cardiovascular Surgery)  Goal: Effective Cerebral Perfusion  Outcome: Ongoing, Progressing  Intervention: Protect and Optimize Cerebral Perfusion  Recent Flowsheet Documentation  Taken 3/19/2021 1600 by Malka Carrillo RN  Glycemic Management:   blood glucose monitoring   insulin infusion adjusted  Taken 3/19/2021 1429 by Malka Carrillo RN  Glycemic Management:   blood glucose monitoring   insulin infusion initiated     Problem: Fluid Imbalance (Cardiovascular Surgery)  Goal: Fluid Balance  Outcome: Ongoing, Progressing  Intervention: Monitor and Manage Fluid Balance  Recent Flowsheet Documentation  Taken 3/19/2021  1600 by Malka Carrillo RN  Fluid/Electrolyte Management: intravenous fluid replacement initiated  Taken 3/19/2021 1429 by Malka Carrillo RN  Fluid/Electrolyte Management: intravenous fluid replacement initiated     Problem: Infection (Cardiovascular Surgery)  Goal: Absence of Infection Signs and Symptoms  Outcome: Ongoing, Progressing  Intervention: Prevent or Manage Infection  Recent Flowsheet Documentation  Taken 3/19/2021 1600 by Malka Carrillo RN  Glycemic Management:   blood glucose monitoring   insulin infusion adjusted  Taken 3/19/2021 1429 by Malka Carrillo RN  Glycemic Management:   blood glucose monitoring   insulin infusion initiated     Problem: Ongoing Anesthesia Effects (Cardiovascular Surgery)  Goal: Anesthesia/Sedation Recovery  Outcome: Ongoing, Progressing  Intervention: Optimize Anesthesia Recovery  Recent Flowsheet Documentation  Taken 3/19/2021 1800 by Malka Carrillo RN  Safety Promotion/Fall Prevention:   fall prevention program maintained   safety round/check completed  Taken 3/19/2021 1700 by Malka Carrillo RN  Safety Promotion/Fall Prevention:   fall prevention program maintained   safety round/check completed  Taken 3/19/2021 1600 by Malka Carrillo RN  Safety Promotion/Fall Prevention:   fall prevention program maintained   safety round/check completed  Taken 3/19/2021 1500 by Malka Carrillo RN  Safety Promotion/Fall Prevention:   fall prevention program maintained   safety round/check completed  Taken 3/19/2021 1429 by Malka Carrillo RN  Safety Promotion/Fall Prevention:   fall prevention program maintained   safety round/check completed     Problem: Pain (Cardiovascular Surgery)  Goal: Acceptable Pain Control  Outcome: Ongoing, Progressing     Problem: Postoperative Nausea and Vomiting (Cardiovascular Surgery)  Goal: Nausea and Vomiting Relief  Outcome: Ongoing, Progressing     Problem: Postoperative Urinary Retention (Cardiovascular Surgery)  Goal: Effective Urinary Elimination  Outcome: Ongoing, Progressing     Problem:  Respiratory Compromise (Cardiovascular Surgery)  Goal: Effective Oxygenation and Ventilation  Outcome: Ongoing, Progressing  Intervention: Promote Airway Secretion Clearance  Recent Flowsheet Documentation  Taken 3/19/2021 1429 by Malka Carrillo RN  Cough And Deep Breathing: unable to perform   Goal Outcome Evaluation:

## 2021-03-19 NOTE — PERIOPERATIVE NURSING NOTE
Patient identified before entering OR using armbands, surgical consent verified, and armbands removed.  Cerebral oximeter pads placed on patient's forehead just above the eyebrows.  Patient transferred to OR table without difficulty. 5 lead EKG and balloon pump leads placed and covered with tape, and fast patches applied with tegaderm covers applied. Cerebral oximeter pads connected to fore site and fast patches connected to defibrillator prior to induction.      ICU NOTIFICATION  SURGERY START-0906  ON BYPASS-1115  OFF BYPASS-1311

## 2021-03-19 NOTE — PROGRESS NOTES
Continued Stay Note   [place]     Patient Name: Krys Cox  MRN: [id number]  Today's Date: 3/19/2021    Admit Date: 3/17/2021    Discharge Plan     Row Name 03/19/21 0852       Plan    Plan Comments  REC'D REPORT FROM CORKY AT [facility] THAT PT. IS $300 OVER INCOME LIMIT TO QUALIFY FOR [place] MEDICAID AND WILL BE SEEN BY A [facility] FINANCIAL COUNSELOR TO ASSIST WITH INDIGENT PROGRAMS THAT MAY BE AVAILABLE.  PT. GETS HER MEDS AND HEALTH CARE AT [facility].        Discharge Codes    No documentation.             SINDHU Mcguire

## 2021-03-19 NOTE — PLAN OF CARE
Problem: Adult Inpatient Plan of Care  Goal: Plan of Care Review  Outcome: Ongoing, Progressing  Flowsheets (Taken 3/19/2021 0644)  Progress: improving  Outcome Summary: npo after mn for Cabg today first cast.  vss.  consent signed and on chart.  cont. to monitor.  [place] rules given to patient and spouse.  cont. to monitor.  call for concerns.   Goal Outcome Evaluation:  Plan of Care Reviewed With: patient, spouse  Progress: improving  Outcome Summary: npo after mn for Cabg today first cast.  vss.  consent signed and on chart.  cont. to monitor.  [place] rules given to patient and spouse.  cont. to monitor.  call for concerns.

## 2021-03-19 NOTE — ANESTHESIA POSTPROCEDURE EVALUATION
"Patient: Krys Cox    Procedure Summary     Date: 03/19/21 Room / Location:  [facility] OR  /  [facility] OR    Anesthesia Start: 0753 Anesthesia Stop: 1428    Procedure: CORONARY ARTERY BYPASS GRAFT X 3 WITH LEFT INTERNAL MAMMARY ARTERY, BILATERAL LOWER EXTREMITY OPEN VEIN HARVEST, AND PERFUSION; APPLICATION OF PREVENA INCISIONAL WOUND VAC X 3; TRANSESOPHAGEAL ECHOCARDIOGRAM (N/A Chest) Diagnosis:       Coronary artery disease involving native coronary artery of native heart with unstable angina pectoris (CMS/HCC)      NSTEMI (non-ST elevated myocardial infarction) (CMS/HCC)      (Coronary artery disease involving native coronary artery of native heart with unstable angina pectoris (CMS/HCC) [I25.110])      (NSTEMI (non-ST elevated myocardial infarction) (CMS/HCC) [I21.4])    Surgeons: Vicente Thomas MD Provider: Brady Bazan CRNA    Anesthesia Type: general ASA Status: 4          Anesthesia Type: general    Vitals  Vitals Value Taken Time   BP 89/57 03/19/21 1500   Temp     Pulse 73 03/19/21 1508   Resp     SpO2 100 % 03/19/21 1508   Vitals shown include unvalidated device data.        Post Anesthesia Care and Evaluation    Patient location during evaluation: ICU  Patient participation: complete - patient cannot participate  Level of consciousness: awake and alert  Pain management: adequate  Airway patency: patent  Anesthetic complications: No anesthetic complications    Cardiovascular status: acceptable  Respiratory status: acceptable, intubated, ETT and ventilator  Hydration status: acceptable    Comments: Blood pressure 134/56, pulse 64, temperature 98.1 °F (36.7 °C), temperature source Oral, resp. rate 18, height 154.9 cm (60.98\"), weight 105 kg (231 lb 7.7 oz), last menstrual period 03/17/2020, SpO2 97 %, not currently breastfeeding.    Pt discharged from PACU based on kacie score >8      "

## 2021-03-19 NOTE — BRIEF OP NOTE
Krys Cox  3/19/2021    Pre-op Diagnosis:   Coronary artery disease involving native coronary artery of native heart with unstable angina pectoris (CMS/[facility]) [I25.110]  NSTEMI (non-ST elevated myocardial infarction) (CMS/[facility]) [I21.4]  Obesity  Poorly controlled diabetes mellitus         Post-Op Diagnosis Codes:     * Coronary artery disease involving native coronary artery of native heart with unstable angina pectoris (CMS/HCC) [I25.110]     * NSTEMI (non-ST elevated myocardial infarction) (CMS/[facility]) [I21.4]  Obesity  Poorly controlled diabetes mellitus    Procedure/CPT® Codes:        Procedure(s):  CORONARY ARTERY BYPASS GRAFT X 3 WITH LEFT INTERNAL MAMMARY ARTERY, BILATERAL LOWER EXTREMITY OPEN VEIN HARVEST, APPLICATION OF PREVENA INCISIONAL WOUND VAC X 3    Surgeon(s):  Vicente Thomas MD    Anesthesia: General    Staff:   Circulator: Tito Guo RN  Perfusionist: Alexandra Jasso  Scrub Person: Obinna Lugo; Katalina Huerta; Rajni Quinn; Annie Galvez         Estimated Blood Loss: cell saver    Urine Voided: 1000 mL    Specimens:                None          Drains:   Closed/Suction Drain 1 Left Chest Bulb 19 Fr. (Active)       Urethral Catheter Straight-tip 16 Fr. (Active)       Y Chest Tube 1 and 2 Anterior Mediastinal 24 Fr. Anterior Mediastinal 24 Fr. (Active)       Findings: See formal op note     Complications: None          Vicente Thomas MD     Date: 3/19/2021  Time: 14:38 CDT

## 2021-03-19 NOTE — ANESTHESIA PROCEDURE NOTES
Preanesthesia Checklist:  Patient identified, IV assessed, risks and benefits discussed, monitors and equipment assessed, procedure being performed at surgeon's request and anesthesia consent obtained.    General Procedure Information  SHARYN Placed for monitoring purposes only -- This is not a diagnostic SHARYN  Diagnostic Indications for Echo:  hemodynamic monitoring  Physician Requesting Echo: Vicente Thomas MD  Location performed:  OR  Intubated  Bite block not placed  Heart visualized  Probe Insertion:  Easy  Probe Type:  Multiplane  Modalities:  2D only, color flow mapping, continuous wave Doppler and pulse wave Doppler        Anesthesia Information  Performed Personally  Anesthesiologist:  Sue Smiley MD      Echocardiogram Comments:       SHARYN placed at surgeon request.     Please see cardiology diagnostic evaluation for complete report.

## 2021-03-19 NOTE — ANESTHESIA PROCEDURE NOTES
Airway  Urgency: elective    Date/Time: 3/19/2021 7:56 AM  Airway not difficult    General Information and Staff    Patient location during procedure: OR  CRNA: Brady Bazan CRNA    Indications and Patient Condition  Indications for airway management: airway protection    Preoxygenated: yes  Mask difficulty assessment: 1 - vent by mask    Final Airway Details  Final airway type: endotracheal airway      Successful airway: ETT  Cuffed: yes   Successful intubation technique: video laryngoscopy  Facilitating devices/methods: intubating stylet  Endotracheal tube insertion site: oral  Blade: Soares  Blade size: 4  ETT size (mm): 7.5  Cormack-Lehane Classification: grade I - full view of glottis  Placement verified by: chest auscultation and capnometry   Measured from: lips  ETT/EBT  to lips (cm): 21  Number of attempts at approach: 1  Assessment: lips, teeth, and gum same as pre-op and atraumatic intubation

## 2021-03-20 ENCOUNTER — APPOINTMENT (OUTPATIENT)
Dept: GENERAL RADIOLOGY | Facility: HOSPITAL | Age: 51
End: 2021-03-20

## 2021-03-20 LAB
ALBUMIN SERPL-MCNC: 2.7 G/DL (ref 3.5–5.2)
ANION GAP SERPL CALCULATED.3IONS-SCNC: 11 MMOL/L (ref 5–15)
BASOPHILS # BLD AUTO: 0.05 10*3/MM3 (ref 0–0.2)
BASOPHILS NFR BLD AUTO: 0.4 % (ref 0–1.5)
BUN SERPL-MCNC: 13 MG/DL (ref 6–20)
BUN/CREAT SERPL: 29.5 (ref 7–25)
CALCIUM SPEC-SCNC: 7.9 MG/DL (ref 8.6–10.5)
CHLORIDE SERPL-SCNC: 105 MMOL/L (ref 98–107)
CO2 SERPL-SCNC: 21 MMOL/L (ref 22–29)
CREAT SERPL-MCNC: 0.44 MG/DL (ref 0.57–1)
DEPRECATED RDW RBC AUTO: 39.2 FL (ref 37–54)
EOSINOPHIL # BLD AUTO: 0 10*3/MM3 (ref 0–0.4)
EOSINOPHIL NFR BLD AUTO: 0 % (ref 0.3–6.2)
ERYTHROCYTE [DISTWIDTH] IN BLOOD BY AUTOMATED COUNT: 13 % (ref 12.3–15.4)
GFR SERPL CREATININE-BSD FRML MDRD: >150 ML/MIN/1.73
GLUCOSE BLDC GLUCOMTR-MCNC: 108 MG/DL (ref 70–130)
GLUCOSE BLDC GLUCOMTR-MCNC: 126 MG/DL (ref 70–130)
GLUCOSE BLDC GLUCOMTR-MCNC: 127 MG/DL (ref 70–130)
GLUCOSE BLDC GLUCOMTR-MCNC: 128 MG/DL (ref 70–130)
GLUCOSE BLDC GLUCOMTR-MCNC: 130 MG/DL (ref 70–130)
GLUCOSE BLDC GLUCOMTR-MCNC: 134 MG/DL (ref 70–130)
GLUCOSE BLDC GLUCOMTR-MCNC: 143 MG/DL (ref 70–130)
GLUCOSE BLDC GLUCOMTR-MCNC: 145 MG/DL (ref 70–130)
GLUCOSE BLDC GLUCOMTR-MCNC: 146 MG/DL (ref 70–130)
GLUCOSE BLDC GLUCOMTR-MCNC: 153 MG/DL (ref 70–130)
GLUCOSE BLDC GLUCOMTR-MCNC: 268 MG/DL (ref 70–130)
GLUCOSE BLDC GLUCOMTR-MCNC: 375 MG/DL (ref 70–130)
GLUCOSE SERPL-MCNC: 134 MG/DL (ref 65–99)
HCT VFR BLD AUTO: 29.3 % (ref 34–46.6)
HGB BLD-MCNC: 9.7 G/DL (ref 12–15.9)
IMM GRANULOCYTES # BLD AUTO: 0.06 10*3/MM3 (ref 0–0.05)
IMM GRANULOCYTES NFR BLD AUTO: 0.5 % (ref 0–0.5)
INR PPP: 1.21 (ref 0.91–1.09)
LYMPHOCYTES # BLD AUTO: 2.1 10*3/MM3 (ref 0.7–3.1)
LYMPHOCYTES NFR BLD AUTO: 16.5 % (ref 19.6–45.3)
MAGNESIUM SERPL-MCNC: 1.6 MG/DL (ref 1.6–2.6)
MCH RBC QN AUTO: 27.5 PG (ref 26.6–33)
MCHC RBC AUTO-ENTMCNC: 33.1 G/DL (ref 31.5–35.7)
MCV RBC AUTO: 83 FL (ref 79–97)
MONOCYTES # BLD AUTO: 1.27 10*3/MM3 (ref 0.1–0.9)
MONOCYTES NFR BLD AUTO: 10 % (ref 5–12)
NEUTROPHILS NFR BLD AUTO: 72.6 % (ref 42.7–76)
NEUTROPHILS NFR BLD AUTO: 9.23 10*3/MM3 (ref 1.7–7)
NRBC BLD AUTO-RTO: 0 /100 WBC (ref 0–0.2)
PHOSPHATE SERPL-MCNC: 4 MG/DL (ref 2.5–4.5)
PLATELET # BLD AUTO: 322 10*3/MM3 (ref 140–450)
PMV BLD AUTO: 10.1 FL (ref 6–12)
POTASSIUM SERPL-SCNC: 4.1 MMOL/L (ref 3.5–5.2)
PROTHROMBIN TIME: 14.9 SECONDS (ref 11.9–14.6)
RBC # BLD AUTO: 3.53 10*6/MM3 (ref 3.77–5.28)
SODIUM SERPL-SCNC: 137 MMOL/L (ref 136–145)
WBC # BLD AUTO: 12.71 10*3/MM3 (ref 3.4–10.8)

## 2021-03-20 PROCEDURE — 93005 ELECTROCARDIOGRAM TRACING: CPT | Performed by: THORACIC SURGERY (CARDIOTHORACIC VASCULAR SURGERY)

## 2021-03-20 PROCEDURE — 93010 ELECTROCARDIOGRAM REPORT: CPT | Performed by: INTERNAL MEDICINE

## 2021-03-20 PROCEDURE — 25010000002 VANCOMYCIN 10 G RECONSTITUTED SOLUTION: Performed by: THORACIC SURGERY (CARDIOTHORACIC VASCULAR SURGERY)

## 2021-03-20 PROCEDURE — 25010000002 AMIODARONE IN DEXTROSE 5% 360-4.14 MG/200ML-% SOLUTION: Performed by: THORACIC SURGERY (CARDIOTHORACIC VASCULAR SURGERY)

## 2021-03-20 PROCEDURE — 94799 UNLISTED PULMONARY SVC/PX: CPT

## 2021-03-20 PROCEDURE — 71045 X-RAY EXAM CHEST 1 VIEW: CPT

## 2021-03-20 PROCEDURE — 80069 RENAL FUNCTION PANEL: CPT | Performed by: THORACIC SURGERY (CARDIOTHORACIC VASCULAR SURGERY)

## 2021-03-20 PROCEDURE — 85610 PROTHROMBIN TIME: CPT | Performed by: THORACIC SURGERY (CARDIOTHORACIC VASCULAR SURGERY)

## 2021-03-20 PROCEDURE — 63710000001 INSULIN LISPRO (HUMAN) PER 5 UNITS: Performed by: THORACIC SURGERY (CARDIOTHORACIC VASCULAR SURGERY)

## 2021-03-20 PROCEDURE — 97162 PT EVAL MOD COMPLEX 30 MIN: CPT

## 2021-03-20 PROCEDURE — 99024 POSTOP FOLLOW-UP VISIT: CPT | Performed by: THORACIC SURGERY (CARDIOTHORACIC VASCULAR SURGERY)

## 2021-03-20 PROCEDURE — 97116 GAIT TRAINING THERAPY: CPT

## 2021-03-20 PROCEDURE — 25010000002 CEFAZOLIN PER 500 MG: Performed by: THORACIC SURGERY (CARDIOTHORACIC VASCULAR SURGERY)

## 2021-03-20 PROCEDURE — 85025 COMPLETE CBC W/AUTO DIFF WBC: CPT | Performed by: THORACIC SURGERY (CARDIOTHORACIC VASCULAR SURGERY)

## 2021-03-20 PROCEDURE — 25010000002 ENOXAPARIN PER 10 MG: Performed by: THORACIC SURGERY (CARDIOTHORACIC VASCULAR SURGERY)

## 2021-03-20 PROCEDURE — 82962 GLUCOSE BLOOD TEST: CPT

## 2021-03-20 PROCEDURE — 83735 ASSAY OF MAGNESIUM: CPT | Performed by: THORACIC SURGERY (CARDIOTHORACIC VASCULAR SURGERY)

## 2021-03-20 PROCEDURE — 25810000003 DEXTROSE 5 % WITH KCL 20 MEQ 20-5 MEQ/L-% SOLUTION: Performed by: THORACIC SURGERY (CARDIOTHORACIC VASCULAR SURGERY)

## 2021-03-20 PROCEDURE — 25010000002 FUROSEMIDE PER 20 MG: Performed by: THORACIC SURGERY (CARDIOTHORACIC VASCULAR SURGERY)

## 2021-03-20 RX ORDER — LIDOCAINE 50 MG/G
2 PATCH TOPICAL
Status: DISCONTINUED | OUTPATIENT
Start: 2021-03-20 | End: 2021-03-20

## 2021-03-20 RX ORDER — FUROSEMIDE 10 MG/ML
20 INJECTION INTRAMUSCULAR; INTRAVENOUS
Status: DISCONTINUED | OUTPATIENT
Start: 2021-03-21 | End: 2021-03-22

## 2021-03-20 RX ORDER — NICOTINE POLACRILEX 4 MG
15 LOZENGE BUCCAL
Status: DISCONTINUED | OUTPATIENT
Start: 2021-03-20 | End: 2021-03-24 | Stop reason: HOSPADM

## 2021-03-20 RX ORDER — FUROSEMIDE 10 MG/ML
20 INJECTION INTRAMUSCULAR; INTRAVENOUS ONCE
Status: COMPLETED | OUTPATIENT
Start: 2021-03-20 | End: 2021-03-20

## 2021-03-20 RX ORDER — DEXTROSE MONOHYDRATE 25 G/50ML
25 INJECTION, SOLUTION INTRAVENOUS
Status: DISCONTINUED | OUTPATIENT
Start: 2021-03-20 | End: 2021-03-24 | Stop reason: HOSPADM

## 2021-03-20 RX ORDER — METHOCARBAMOL 500 MG/1
500 TABLET, FILM COATED ORAL EVERY 8 HOURS SCHEDULED
Status: DISCONTINUED | OUTPATIENT
Start: 2021-03-21 | End: 2021-03-24 | Stop reason: HOSPADM

## 2021-03-20 RX ORDER — LIDOCAINE 50 MG/G
2 PATCH TOPICAL
Status: DISCONTINUED | OUTPATIENT
Start: 2021-03-20 | End: 2021-03-24 | Stop reason: HOSPADM

## 2021-03-20 RX ORDER — PREGABALIN 25 MG/1
25 CAPSULE ORAL 2 TIMES DAILY
Status: DISCONTINUED | OUTPATIENT
Start: 2021-03-20 | End: 2021-03-24 | Stop reason: HOSPADM

## 2021-03-20 RX ORDER — POTASSIUM CHLORIDE 750 MG/1
20 CAPSULE, EXTENDED RELEASE ORAL 2 TIMES DAILY WITH MEALS
Status: DISCONTINUED | OUTPATIENT
Start: 2021-03-21 | End: 2021-03-22

## 2021-03-20 RX ADMIN — VANCOMYCIN HYDROCHLORIDE 1500 MG: 10 INJECTION, POWDER, LYOPHILIZED, FOR SOLUTION INTRAVENOUS at 21:46

## 2021-03-20 RX ADMIN — VANCOMYCIN HYDROCHLORIDE 1500 MG: 10 INJECTION, POWDER, LYOPHILIZED, FOR SOLUTION INTRAVENOUS at 09:44

## 2021-03-20 RX ADMIN — IPRATROPIUM BROMIDE AND ALBUTEROL SULFATE 3 ML: 2.5; .5 SOLUTION RESPIRATORY (INHALATION) at 19:36

## 2021-03-20 RX ADMIN — ASPIRIN 162 MG: 81 TABLET, CHEWABLE ORAL at 09:08

## 2021-03-20 RX ADMIN — CHLORHEXIDINE GLUCONATE 0.12% ORAL RINSE 15 ML: 1.2 LIQUID ORAL at 05:20

## 2021-03-20 RX ADMIN — OXYCODONE HYDROCHLORIDE AND ACETAMINOPHEN 1 TABLET: 10; 325 TABLET ORAL at 06:27

## 2021-03-20 RX ADMIN — AMIODARONE HYDROCHLORIDE 200 MG: 200 TABLET ORAL at 23:48

## 2021-03-20 RX ADMIN — PREGABALIN 25 MG: 25 CAPSULE ORAL at 21:54

## 2021-03-20 RX ADMIN — OXYCODONE HYDROCHLORIDE AND ACETAMINOPHEN 1 TABLET: 10; 325 TABLET ORAL at 02:39

## 2021-03-20 RX ADMIN — LIDOCAINE 2 PATCH: 50 PATCH CUTANEOUS at 03:52

## 2021-03-20 RX ADMIN — AMIODARONE HYDROCHLORIDE 0.5 MG/MIN: 1.8 INJECTION, SOLUTION INTRAVENOUS at 08:15

## 2021-03-20 RX ADMIN — INSULIN LISPRO 6 UNITS: 100 INJECTION, SOLUTION INTRAVENOUS; SUBCUTANEOUS at 17:25

## 2021-03-20 RX ADMIN — PREGABALIN 25 MG: 25 CAPSULE ORAL at 02:39

## 2021-03-20 RX ADMIN — METOPROLOL TARTRATE 12.5 MG: 25 TABLET, FILM COATED ORAL at 09:09

## 2021-03-20 RX ADMIN — SODIUM CHLORIDE, POTASSIUM CHLORIDE, SODIUM LACTATE AND CALCIUM CHLORIDE 1000 ML: 600; 310; 30; 20 INJECTION, SOLUTION INTRAVENOUS at 02:29

## 2021-03-20 RX ADMIN — POTASSIUM CHLORIDE AND DEXTROSE MONOHYDRATE 30 ML/HR: 150; 5 INJECTION, SOLUTION INTRAVENOUS at 22:21

## 2021-03-20 RX ADMIN — MUPIROCIN: 20 OINTMENT TOPICAL at 05:21

## 2021-03-20 RX ADMIN — IPRATROPIUM BROMIDE AND ALBUTEROL SULFATE 3 ML: 2.5; .5 SOLUTION RESPIRATORY (INHALATION) at 13:55

## 2021-03-20 RX ADMIN — AMIODARONE HYDROCHLORIDE 200 MG: 200 TABLET ORAL at 18:43

## 2021-03-20 RX ADMIN — CLOPIDOGREL 75 MG: 75 TABLET, FILM COATED ORAL at 18:43

## 2021-03-20 RX ADMIN — LIDOCAINE 2 PATCH: 50 PATCH CUTANEOUS at 15:58

## 2021-03-20 RX ADMIN — ENOXAPARIN SODIUM 30 MG: 30 INJECTION SUBCUTANEOUS at 21:54

## 2021-03-20 RX ADMIN — CEFAZOLIN SODIUM 2 G: 10 INJECTION, POWDER, FOR SOLUTION INTRAVENOUS at 13:49

## 2021-03-20 RX ADMIN — OXYCODONE HYDROCHLORIDE AND ACETAMINOPHEN 1 TABLET: 10; 325 TABLET ORAL at 09:33

## 2021-03-20 RX ADMIN — ENOXAPARIN SODIUM 30 MG: 30 INJECTION SUBCUTANEOUS at 09:11

## 2021-03-20 RX ADMIN — CEFAZOLIN SODIUM 2 G: 10 INJECTION, POWDER, FOR SOLUTION INTRAVENOUS at 05:19

## 2021-03-20 RX ADMIN — ATORVASTATIN CALCIUM 20 MG: 10 TABLET, FILM COATED ORAL at 21:48

## 2021-03-20 RX ADMIN — OXYCODONE HYDROCHLORIDE AND ACETAMINOPHEN 1 TABLET: 10; 325 TABLET ORAL at 13:48

## 2021-03-20 RX ADMIN — METHOCARBAMOL 500 MG: 500 TABLET, FILM COATED ORAL at 23:47

## 2021-03-20 RX ADMIN — CHLORHEXIDINE GLUCONATE 0.12% ORAL RINSE 15 ML: 1.2 LIQUID ORAL at 18:43

## 2021-03-20 RX ADMIN — IPRATROPIUM BROMIDE AND ALBUTEROL SULFATE 3 ML: 2.5; .5 SOLUTION RESPIRATORY (INHALATION) at 06:51

## 2021-03-20 RX ADMIN — OXYCODONE HYDROCHLORIDE AND ACETAMINOPHEN 1 TABLET: 10; 325 TABLET ORAL at 21:54

## 2021-03-20 RX ADMIN — LEVOTHYROXINE SODIUM 150 MCG: 150 TABLET ORAL at 05:20

## 2021-03-20 RX ADMIN — METOPROLOL TARTRATE 12.5 MG: 25 TABLET, FILM COATED ORAL at 21:48

## 2021-03-20 RX ADMIN — IPRATROPIUM BROMIDE AND ALBUTEROL SULFATE 3 ML: 2.5; .5 SOLUTION RESPIRATORY (INHALATION) at 10:22

## 2021-03-20 RX ADMIN — MUPIROCIN 1 APPLICATION: 20 OINTMENT TOPICAL at 18:43

## 2021-03-20 RX ADMIN — POTASSIUM CHLORIDE AND DEXTROSE MONOHYDRATE 30 ML/HR: 150; 5 INJECTION, SOLUTION INTRAVENOUS at 22:38

## 2021-03-20 RX ADMIN — CEFAZOLIN SODIUM 2 G: 10 INJECTION, POWDER, FOR SOLUTION INTRAVENOUS at 21:47

## 2021-03-20 RX ADMIN — AMIODARONE HYDROCHLORIDE 200 MG: 200 TABLET ORAL at 12:07

## 2021-03-20 RX ADMIN — PREGABALIN 25 MG: 25 CAPSULE ORAL at 09:09

## 2021-03-20 RX ADMIN — FUROSEMIDE 20 MG: 10 INJECTION, SOLUTION INTRAMUSCULAR; INTRAVENOUS at 13:49

## 2021-03-20 RX ADMIN — AMIODARONE HYDROCHLORIDE 200 MG: 200 TABLET ORAL at 05:20

## 2021-03-20 NOTE — PROGRESS NOTES
Continued Stay Note  BINDU Mac     Patient Name: Krys Cox  MRN: [id number]  Today's Date: 3/20/2021    Admit Date: 3/17/2021    Discharge Plan     Row Name 03/20/21 1511       Plan    Plan Comments  SW received consult stating discharge planning. Per PT notes pt is safe to go home at discharge. SW will follow for other needs        Discharge Codes    No documentation.             Rukhsana Hansen

## 2021-03-20 NOTE — PLAN OF CARE
Goal Outcome Evaluation:  Plan of Care Reviewed With: patient  Progress: improving  Outcome Summary: PT eval completed.  Pt agreeable to participate w/ PT.  Pt education for sternal precautions and safety/falls prevention.  Pt groans throughout visit.  Education for improved tech w/ deep breathing.  Pt up in chair upon this PTs arrival.  Performed 10 reps each of warm up ex w/ U/LEs.  Stood w/ min assist of 2 and ambulated ~ 80 ft w/ min to CGA 2 w/ assist to manage equipment.  Pt demonstrated decrease gait speed, decrease step length, heel strike and foot clearance w/ wide MELVIN.  Pt returned to sit up in chair after walking and performed 10 reps each of cool down ex w/ B U/LEs.  Pt will benefit from continued PT services to improve knowledge of sternal precautions, safety awareness, to improve overall strength, activity tolerance, balance and I w/ functional mobility, and to improve knowledge/I w/ cardiac HEP.  Recommend home w/ assist at discharge if pt progresses as expected.

## 2021-03-20 NOTE — THERAPY TREATMENT NOTE
Acute Care - Physical Therapy Treatment Note  [facility]     Patient Name: Krys Cox  : 1970  MRN: [id number]  Today's Date: 3/20/2021           PT Assessment (last 12 hours)      PT Evaluation and Treatment     [facility] Name 21 1300          Physical Therapy Time and Intention    Subjective Information  complains of;weakness;fatigue;pain  -     Document Type  therapy note (daily note)  -     Mode of Treatment  physical therapy  -     Comment  3 prevena vacs,chest tubes  -     Row Name 21 1300          General Information    Existing Precautions/Restrictions  cardiac;fall;sternal 3 prevena, chest tubes  -     Barriers to Rehab  medically complex  -     Row Name 21 1300          Pain    Additional Documentation  Pain Scale: Numbers Pre/Post-Treatment (Group)  -AH     Row Name 21 1300          Pain Scale: Numbers Pre/Post-Treatment    Pretreatment Pain Rating  3/10  -     Posttreatment Pain Rating  3/10  -     Pain Location - Orientation  incisional  -     Pain Location  chest  -[facility] Name 21 1300          Pain Scale: Word Pre/Post-Treatment    Pain Intervention(s)  Medication (See MAR);Repositioned;Ambulation/increased activity  -[facility] Name 21 1300          Gait/Stairs (Locomotion)    [place] Level (Gait)  minimum assist (75% patient effort);verbal cues;1 person to manage equipment;2 person assist  -     Distance in Feet (Gait)  160  -[facility] Name 21 1300          Motor Skills    Therapeutic Exercise  aerobic  -[facility] Name 21 1300          Aerobic Exercise    Time Performed (Aerobic Exercise)  cardiac warm up/ cool down protocol x 20 reps  -[facility] Name             Wound 21 0959 Right lower leg    Wound - Properties Group Placement Date: 21  -KR Placement Time: 959  -KR Present on Hospital Admission: N  -KR Side: Right  -KR Orientation: lower  -KR Location: leg  -KR    Retired Wound - Properties Group  Date first assessed: 03/19/21  -KR Time first assessed: 0959 -KR Present on Hospital Admission: N  -KR Side: Right  -KR Location: leg  -KR    Row Name             Wound 03/19/21 0959 Left lower leg    Wound - Properties Group Placement Date: 03/19/21  -KR Placement Time: 0959  -KR Present on Hospital Admission: N  -KR Side: Left  -KR Orientation: lower  -KR Location: leg  -KR    Retired Wound - Properties Group Date first assessed: 03/19/21  -KR Time first assessed: 0959  -KR Present on Hospital Admission: N  -KR Side: Left  -KR Location: leg  -KR    Row Name             Wound 03/19/21 0959 anterior sternal    Wound - Properties Group Placement Date: 03/19/21  -KR Placement Time: 0959 -KR Present on Hospital Admission: N  -KR Orientation: anterior  -KR Location: sternal  -KR    Retired Wound - Properties Group Date first assessed: 03/19/21  -KR Time first assessed: 0959  -KR Present on Hospital Admission: N  -KR Location: sternal  -KR    Row Name 03/20/21 1300          Vital Signs    Pre Systolic BP Rehab  104  -AH     Pre Treatment Diastolic BP  59  -AH     Post Systolic BP Rehab  119  -AH     Pretreatment Heart Rate (beats/min)  74  -AH     Posttreatment Heart Rate (beats/min)  79  -AH     Pre SpO2 (%)  100  -AH     O2 Delivery Pre Treatment  nasal cannula 2L  -AH     O2 Delivery Intra Treatment  room air  -AH     Post SpO2 (%)  100  -AH     O2 Delivery Post Treatment  room air  -AH     Pre Patient Position  Sitting  -AH     Post Patient Position  Sitting  -AH     Row Name 03/20/21 1300          Positioning and Restraints    Pre-Treatment Position  sitting in chair/recliner  -AH     Post Treatment Position  chair  -AH     In Chair  reclined;call light within reach;encouraged to call for assist;notified [facility]  -       User Key  (r) = Recorded By, (t) = Taken By, (c) = Cosigned By    Initials Name Provider Type    Jayde Montes PTA Physical Therapy Assistant    Tito Klein --        Physical Therapy  Education                 Title: PT OT SLP Therapies (Done)     Topic: Physical Therapy (Done)     Point: Mobility training (Done)     Learning Progress Summary           Patient Acceptance, E,TB,D, VU,DU,NR by  at 3/20/2021 1216    Comment: Education re: purpose of PT/importance of activity; education for safety/falls prevention; education for sternal precautions, proper tech w/ tfers, deep breathing and improved gait mechanics                   Point: Home exercise program (Done)     Learning Progress Summary           Patient Acceptance, E,TB,D, VU,DU,NR by  at 3/20/2021 1216    Comment: Education re: purpose of PT/importance of activity; education for safety/falls prevention; education for sternal precautions, proper tech w/ tfers, deep breathing and improved gait mechanics                   Point: Precautions (Done)     Learning Progress Summary           Patient Acceptance, E,TB,D, VU,DU,NR by  at 3/20/2021 1216    Comment: Education re: purpose of PT/importance of activity; education for safety/falls prevention; education for sternal precautions, proper tech w/ tfers, deep breathing and improved gait mechanics                               User Key     Initials Effective Dates Name Provider Type [facility] 08/02/18 -  Judith Kimball, PT Physical Therapist PT              PT Recommendation and Plan             Time Calculation:   PT Charges     Row Name 03/20/21 1340 03/20/21 0956          Time Calculation    Start Time  1300  -  0902  -     Stop Time  1323  -  0947  [facility]     Time Calculation (min)  23 min  [facility]  45 min  [facility]     PT Received On  --  03/20/21  -     PT Goal Re-Cert Due Date  --  03/30/21  -        Time Calculation- PT    Total Timed Code Minutes- PT  23 minute(s)  -  --        Timed Charges    80340 - Gait Training Minutes   23  -  --       User Key  (r) = Recorded By, (t) = Taken By, (c) = Cosigned By    Initials Name Provider Type     Jayde Saavedra, PTA Physical  Therapy Assistant    Judith Car, PT Physical Therapist        Therapy Charges for Today     Code Description Service Date Service Provider Modifiers Qty    75282645550 HC GAIT TRAINING EA 15 MIN 3/20/2021 Jayde Saavedra, PTA GP 2          PT G-Codes  Outcome Measure Options: AM-PAC 6 Clicks Basic Mobility (PT)  AM-PAC 6 Clicks Score (PT): 12    Jayde Saavedra, JAIME  3/20/2021

## 2021-03-20 NOTE — THERAPY EVALUATION
Patient Name: Krys Cox  : 1970    MRN: [id number]                              Today's Date: 3/20/2021       Admit Date: 3/17/2021    Visit Dx:     ICD-10-CM ICD-9-CM   1. Coronary artery disease involving native coronary artery of native heart with unstable angina pectoris (CMS/[facility])  I25.110 414.01     411.1   2. NSTEMI (non-ST elevated myocardial infarction) (CMS/[facility])  I21.4 410.70   3. Impaired functional mobility and activity tolerance  Z74.09 V49.89     Patient Active Problem List   Diagnosis   • Coronary artery disease involving native coronary artery of native heart with unstable angina pectoris (CMS/[facility])   • NSTEMI (non-ST elevated myocardial infarction) (CMS/[facility])   • Hypertension   • Poorly controlled diabetes mellitus (CMS/[facility])   • Class 3 severe obesity due to excess calories with serious comorbidity and body mass index (BMI) of 40.0 to 44.9 in adult (CMS/[facility])   • Hypothyroid   • Tobacco use   • Osteoarthritis   • Anxiety   • Systolic congestive heart failure (CMS/[facility])   • Peptic ulcer   • Lung nodules     Past Medical History:   Diagnosis Date   • Arthritis    • CHF (congestive heart failure) (CMS/[facility])    • Diabetes mellitus (CMS/[facility])    • Disease of thyroid gland    • Elevated cholesterol    • Hypertension      Past Surgical History:   Procedure Laterality Date   • CARDIAC CATHETERIZATION     •  SECTION      she has had 4   • DILATATION AND CURETTAGE     • TUBAL ABDOMINAL LIGATION       General Information     Row Name 21 0902          Physical Therapy Time and Intention    Document Type  evaluation;other (see comments) see MAR; pt w/ recent NSTEMI now s/p CABG X 3 w/ 3 prevena wound VACs (chest, B LEs)  -     Mode of Treatment  physical therapy  -     Row Name 21 0902          General Information    Patient Profile Reviewed  yes  -JE     Prior Level of Function  independent:;all household mobility;community mobility;ADL's;home management;driving;shopping;using  stairs  -     Existing Precautions/Restrictions  fall;sternal  -     Barriers to Rehab  medically complex  -     Row Name 03/20/21 0902          Living Environment    Lives With  spouse has tub shower combo  -     Row Name 03/20/21 0902          Home Main Entrance    Number of Stairs, Main Entrance  one  -     Stair Railings, Main Entrance  none  -     Row Name 03/20/21 0902          Stairs Within Home, Primary    Number of Stairs, Within Home, Primary  none  -     Row Name 03/20/21 0902          Cognition    Orientation Status (Cognition)  oriented x 4  -     Row Name 03/20/21 0902          Safety Issues, Functional Mobility    Safety Issues Affecting Function (Mobility)  friction/shear risk;safety precaution awareness  -     Impairments Affecting Function (Mobility)  balance;endurance/activity tolerance;pain;shortness of breath;sensation/sensory awareness;strength  -     Row Name 03/20/21 0902          Relationship/Environment    Name(s) of Who Lives With Patient  [place]  -       User Key  (r) = Recorded By, (t) = Taken By, (c) = Cosigned By    Initials Name Provider Type    Judith Car, PT Physical Therapist        Mobility     Row Name 03/20/21 0902          Bed Mobility    Comment (Bed Mobility)  pt up in chair and returned to sit up in chair at end of visit  -     Row Name 03/20/21 0902          Sit-Stand Transfer    Sit-Stand [place] (Transfers)  minimum assist (75% patient effort);2 person assist;verbal cues  -     Row Name 03/20/21 0902          Gait/Stairs (Locomotion)    [place] Level (Gait)  minimum assist (75% patient effort);contact guard;2 person assist;verbal cues;1 person to manage equipment  -     Distance in Feet (Gait)  80 ft  -     Deviations/Abnormal Patterns (Gait)  base of support, wide;gait speed decreased;stride length decreased  -     Bilateral Gait Deviations  heel strike decreased  -       User Key  (r) = Recorded By, (t) = Taken By,  (c) = Cosigned By    Initials Name Provider Type    Judith Car, PT Physical Therapist        Obj/Interventions     Row Name 03/20/21 1222          Range of Motion Comprehensive    Comment, General Range of Motion  gaurded mvmt all 4 extremities, however WFLS  -     Row Name 03/20/21 1222          Strength Comprehensive (MMT)    Comment, General Manual Muscle Testing (MMT) Assessment  B ankle DF/PF 5/5, pt moves all other jts against gravity, however gaurded mvmt, no formal assessment  -     Row Name 03/20/21 1222          Motor Skills    Therapeutic Exercise  other (see comments) warm up/cool down ex w/ B UE/LEs times 10 reps each  -     Row Name 03/20/21 1222          Balance    Balance Assessment  sitting static balance;standing static balance;standing dynamic balance  -     Static Sitting Balance  WNL;supported;sitting in chair  -     Static Standing Balance  mild impairment;supported;standing  -     Dynamic Standing Balance  mild impairment;supported;standing  -     Comment, Balance  required 2 people assist for improved stability  -[facility] Name 03/20/21 1222          Sensory Assessment (Somatosensory)    Sensory Assessment (Somatosensory)  other (see comments) reports tingling in the L hand/wrist and lower forearm since surgery  -       User Key  (r) = Recorded By, (t) = Taken By, (c) = Cosigned By    Initials Name Provider Type    Judith Car, PT Physical Therapist        Goals/Plan     [facility] Name 03/20/21 0902          Bed Mobility Goal 1 (PT)    Activity/Assistive Device (Bed Mobility Goal 1, PT)  scooting;sit to supine/supine to sit  -     [place] Level/Cues Needed (Bed Mobility Goal 1, PT)  standby assist  -     Time Frame (Bed Mobility Goal 1, PT)  long term goal (LTG);10 days  -     Progress/Outcomes (Bed Mobility Goal 1, PT)  goal ongoing  -     Row Name 03/20/21 0902          Transfer Goal 1 (PT)    Activity/Assistive Device (Transfer Goal 1, PT)   sit-to-stand/stand-to-sit;bed-to-chair/chair-to-bed  -JE     [place] Level/Cues Needed (Transfer Goal 1, PT)  independent  -JE     Time Frame (Transfer Goal 1, PT)  long term goal (LTG);10 days  -JE     Progress/Outcome (Transfer Goal 1, PT)  goal ongoing  -     Row Name 03/20/21 0902          Gait Training Goal 1 (PT)    Activity/Assistive Device (Gait Training Goal 1, PT)  gait (walking locomotion);decrease fall risk;diminish gait deviation;improve balance and speed;increase endurance/gait distance;increase energy conservation  -JE     [place] Level (Gait Training Goal 1, PT)  standby assist;independent  -JE     Distance (Gait Training Goal 1, PT)  350+ ft  -JE     Time Frame (Gait Training Goal 1, PT)  long term goal (LTG);10 days  -JE     Progress/Outcome (Gait Training Goal 1, PT)  goal ongoing  -     Row Name 03/20/21 0902          Stairs Goal 1 (PT)    Activity/Assistive Device (Stairs Goal 1, PT)  ascending stairs;descending stairs;step-to-step;decrease fall risk;improve balance and speed  -     [place] Level/Cues Needed (Stairs Goal 1, PT)  contact guard assist  -JE     Number of Stairs (Stairs Goal 1, PT)  1-2  -JE     Time Frame (Stairs Goal 1, PT)  long term goal (LTG);10 days  -JE     Progress/Outcome (Stairs Goal 1, PT)  goal ongoing  -     Row Name 03/20/21 0902          Patient Education Goal (PT)    Activity (Patient Education Goal, PT)  cardiac HEP  -JE     [place]/Cues/Accuracy (Memory Goal 2, PT)  demonstrates adequately;independent;verbalizes understanding  -JE     Time Frame (Patient Education Goal, PT)  long term goal (LTG);10 days  -JE     Progress/Outcome (Patient Education Goal, PT)  goal ongoing  -       User Key  (r) = Recorded By, (t) = Taken By, (c) = Cosigned By    Initials Name Provider Type    Judith Car, PT Physical Therapist        Clinical Impression     Row Name 03/20/21 0902          Pain    Additional Documentation  Pain Scale: FACES  Pre/Post-Treatment (Group)  -     Row Name 03/20/21 0902          Pain Scale: Numbers Pre/Post-Treatment    Pain Location - Orientation  incisional  -     Pain Location  chest;other (see comments) LEs  -     Pain Intervention(s)  Medication (See MAR);Repositioned;Ambulation/increased activity;Rest  -     Row Name 03/20/21 0902          Pain Scale: FACES Pre/Post-Treatment    Pain: FACES Scale, Pretreatment  4-->hurts little more  -     Posttreatment Pain Rating  4-->hurts little more  -     Row Name 03/20/21 0902          Plan of Care Review    Plan of Care Reviewed With  patient  -     Progress  improving  -     Outcome Summary  PT eval completed.  Pt agreeable to participate w/ PT.  Pt education for sternal precautions and safety/falls prevention.  Pt groans throughout visit.  Education for improved tech w/ deep breathing.  Pt up in chair upon this PTs arrival.  Performed 10 reps each of warm up ex w/ U/LEs.  Stood w/ min assist of 2 and ambulated ~ 80 ft w/ min to CGA 2 w/ assist to manage equipment.  Pt demonstrated decrease gait speed, decrease step length, heel strike and foot clearance w/ wide MELVIN.  Pt returned to sit up in chair after walking and performed 10 reps each of cool down ex w/ B U/LEs.  Pt will benefit from continued PT services to improve knowledge of sternal precautions, safety awareness, to improve overall strength, activity tolerance, balance and I w/ functional mobility, and to improve knowledge/I w/ cardiac HEP.  Recommend home w/ assist at discharge if pt progresses as expected.  -     Row Name 03/20/21 0902          Therapy Assessment/Plan (PT)    Patient/Family Therapy Goals Statement (PT)  improve mobility  -     Rehab Potential (PT)  good, to achieve stated therapy goals  -     Criteria for Skilled Interventions Met (PT)  yes;meets criteria;skilled treatment is necessary  -     Predicted Duration of Therapy Intervention (PT)  until discharge or goals achieved   -JE     Row Name 03/20/21 0902          Vital Signs    Pre Systolic BP Rehab  95  -JE     Pre Treatment Diastolic BP  52  -JE     Post Systolic BP Rehab  109  -JE     Post Treatment Diastolic BP  51  -JE     Pretreatment Heart Rate (beats/min)  80  -JE     Posttreatment Heart Rate (beats/min)  80  -JE     Pretreatment Resp Rate (breaths/min)  20  -JE     Posttreatment Resp Rate (breaths/min)  18  -JE     Pre SpO2 (%)  98  -JE     O2 Delivery Pre Treatment  supplemental O2  -JE     O2 Delivery Intra Treatment  supplemental O2  -JE     Post SpO2 (%)  100  -JE     O2 Delivery Post Treatment  supplemental O2  -JE     Pre Patient Position  Sitting  -JE     Intra Patient Position  Standing  -JE     Post Patient Position  Sitting  -JE     Row Name 03/20/21 0902          Positioning and Restraints    Pre-Treatment Position  sitting in chair/recliner  -JE     Post Treatment Position  chair  -JE     In Chair  reclined;call light within reach;encouraged to call for assist;with nsg;RUE elevated;LUE elevated;legs elevated;patient within staff view  -       User Key  (r) = Recorded By, (t) = Taken By, (c) = Cosigned By    Initials Name Provider Type    Judith Car, PT Physical Therapist        Outcome Measures     Row Name 03/20/21 0902          How much help from another person do you currently need...    Turning from your back to your side while in flat bed without using bedrails?  2  -JE     Moving from lying on back to sitting on the side of a flat bed without bedrails?  2  -JE     Moving to and from a bed to a chair (including a wheelchair)?  2  -JE     Standing up from a chair using your arms (e.g., wheelchair, bedside chair)?  2  -JE     Climbing 3-5 steps with a railing?  2  -JE     To walk in hospital room?  2  -JE     AM-PAC 6 Clicks Score (PT)  12  -JE     Row Name 03/20/21 0902          Functional Assessment    Outcome Measure Options  AM-PAC 6 Clicks Basic Mobility (PT)  -       User Key  (r) = Recorded  By, (t) = Taken By, (c) = Cosigned By    Initials Name Provider Type    Judith Car, PT Physical Therapist        Physical Therapy Education                 Title: PT OT SLP Therapies (Done)     Topic: Physical Therapy (Done)     Point: Mobility training (Done)     Learning Progress Summary           Patient Acceptance, E,TB,D, VU,DU,NR by VALARIE at 3/20/2021 1216    Comment: Education re: purpose of PT/importance of activity; education for safety/falls prevention; education for sternal precautions, proper tech w/ tfers, deep breathing and improved gait mechanics                   Point: Home exercise program (Done)     Learning Progress Summary           Patient Acceptance, E,TB,D, VU,DU,NR by VALARIE at 3/20/2021 1216    Comment: Education re: purpose of PT/importance of activity; education for safety/falls prevention; education for sternal precautions, proper tech w/ tfers, deep breathing and improved gait mechanics                   Point: Precautions (Done)     Learning Progress Summary           Patient Acceptance, E,TB,D, VU,DU,NR by VALARIE at 3/20/2021 1216    Comment: Education re: purpose of PT/importance of activity; education for safety/falls prevention; education for sternal precautions, proper tech w/ tfers, deep breathing and improved gait mechanics                               User Key     Initials Effective Dates Name Provider Type Discipline    VALARIE 08/02/18 -  Judith Kimball, PT Physical Therapist PT              PT Recommendation and Plan  Planned Therapy Interventions (PT): balance training, bed mobility training, gait training, home exercise program, patient/family education, postural re-education, stair training, transfer training, ROM (range of motion), other (see comments) (safety/falls prevention, sternal precautions, cardiac HEP)  Plan of Care Reviewed With: patient  Progress: improving  Outcome Summary: PT eval completed.  Pt agreeable to participate w/ PT.  Pt education for sternal  precautions and safety/falls prevention.  Pt groans throughout visit.  Education for improved tech w/ deep breathing.  Pt up in chair upon this PTs arrival.  Performed 10 reps each of warm up ex w/ U/LEs.  Stood w/ min assist of 2 and ambulated ~ 80 ft w/ min to CGA 2 w/ assist to manage equipment.  Pt demonstrated decrease gait speed, decrease step length, heel strike and foot clearance w/ wide MELVIN.  Pt returned to sit up in chair after walking and performed 10 reps each of cool down ex w/ B U/LEs.  Pt will benefit from continued PT services to improve knowledge of sternal precautions, safety awareness, to improve overall strength, activity tolerance, balance and I w/ functional mobility, and to improve knowledge/I w/ cardiac HEP.  Recommend home w/ assist at discharge if pt progresses as expected.     Time Calculation:   PT Charges     Row Name 03/20/21 0956             Time Calculation    Start Time  0902  -      Stop Time  0947  -      Time Calculation (min)  45 min  -      PT Received On  03/20/21  -      PT Goal Re-Cert Due Date  03/30/21  -        User Key  (r) = Recorded By, (t) = Taken By, (c) = Cosigned By    Initials Name Provider Type    Judith Car, PT Physical Therapist        Therapy Charges for Today     Code Description Service Date Service Provider Modifiers Qty    09914118954 HC PT EVAL MOD COMPLEXITY 3 3/20/2021 Judith Kimball PT GP 1          PT G-Codes  Outcome Measure Options: AM-PAC 6 Clicks Basic Mobility (PT)  AM-PAC 6 Clicks Score (PT): 12    Judith Kimball PT  3/20/2021

## 2021-03-21 ENCOUNTER — APPOINTMENT (OUTPATIENT)
Dept: GENERAL RADIOLOGY | Facility: HOSPITAL | Age: 51
End: 2021-03-21

## 2021-03-21 LAB
ANION GAP SERPL CALCULATED.3IONS-SCNC: 12 MMOL/L (ref 5–15)
BUN SERPL-MCNC: 20 MG/DL (ref 6–20)
BUN/CREAT SERPL: 29 (ref 7–25)
CALCIUM SPEC-SCNC: 8.4 MG/DL (ref 8.6–10.5)
CHLORIDE SERPL-SCNC: 97 MMOL/L (ref 98–107)
CO2 SERPL-SCNC: 18 MMOL/L (ref 22–29)
CREAT SERPL-MCNC: 0.69 MG/DL (ref 0.57–1)
DEPRECATED RDW RBC AUTO: 39.3 FL (ref 37–54)
ERYTHROCYTE [DISTWIDTH] IN BLOOD BY AUTOMATED COUNT: 13 % (ref 12.3–15.4)
GFR SERPL CREATININE-BSD FRML MDRD: 90 ML/MIN/1.73
GLUCOSE BLDC GLUCOMTR-MCNC: 373 MG/DL (ref 70–130)
GLUCOSE BLDC GLUCOMTR-MCNC: 381 MG/DL (ref 70–130)
GLUCOSE BLDC GLUCOMTR-MCNC: 382 MG/DL (ref 70–130)
GLUCOSE BLDC GLUCOMTR-MCNC: 405 MG/DL (ref 70–130)
GLUCOSE BLDC GLUCOMTR-MCNC: 412 MG/DL (ref 70–130)
GLUCOSE BLDC GLUCOMTR-MCNC: 474 MG/DL (ref 70–130)
GLUCOSE SERPL-MCNC: 410 MG/DL (ref 65–99)
HCT VFR BLD AUTO: 29.9 % (ref 34–46.6)
HGB BLD-MCNC: 9.8 G/DL (ref 12–15.9)
MCH RBC QN AUTO: 27.1 PG (ref 26.6–33)
MCHC RBC AUTO-ENTMCNC: 32.8 G/DL (ref 31.5–35.7)
MCV RBC AUTO: 82.6 FL (ref 79–97)
PLATELET # BLD AUTO: 368 10*3/MM3 (ref 140–450)
PMV BLD AUTO: 10.5 FL (ref 6–12)
POTASSIUM SERPL-SCNC: 5 MMOL/L (ref 3.5–5.2)
QT INTERVAL: 398 MS
QT INTERVAL: 466 MS
QTC INTERVAL: 453 MS
QTC INTERVAL: 520 MS
RBC # BLD AUTO: 3.62 10*6/MM3 (ref 3.77–5.28)
SODIUM SERPL-SCNC: 127 MMOL/L (ref 136–145)
WBC # BLD AUTO: 18.88 10*3/MM3 (ref 3.4–10.8)

## 2021-03-21 PROCEDURE — 25010000002 CEFAZOLIN PER 500 MG: Performed by: THORACIC SURGERY (CARDIOTHORACIC VASCULAR SURGERY)

## 2021-03-21 PROCEDURE — 94799 UNLISTED PULMONARY SVC/PX: CPT

## 2021-03-21 PROCEDURE — 82962 GLUCOSE BLOOD TEST: CPT

## 2021-03-21 PROCEDURE — 99024 POSTOP FOLLOW-UP VISIT: CPT | Performed by: THORACIC SURGERY (CARDIOTHORACIC VASCULAR SURGERY)

## 2021-03-21 PROCEDURE — 71045 X-RAY EXAM CHEST 1 VIEW: CPT

## 2021-03-21 PROCEDURE — 25010000002 ENOXAPARIN PER 10 MG: Performed by: THORACIC SURGERY (CARDIOTHORACIC VASCULAR SURGERY)

## 2021-03-21 PROCEDURE — 97110 THERAPEUTIC EXERCISES: CPT

## 2021-03-21 PROCEDURE — 63710000001 INSULIN DETEMIR PER 5 UNITS: Performed by: THORACIC SURGERY (CARDIOTHORACIC VASCULAR SURGERY)

## 2021-03-21 PROCEDURE — 80048 BASIC METABOLIC PNL TOTAL CA: CPT | Performed by: THORACIC SURGERY (CARDIOTHORACIC VASCULAR SURGERY)

## 2021-03-21 PROCEDURE — 63710000001 INSULIN LISPRO (HUMAN) PER 5 UNITS: Performed by: THORACIC SURGERY (CARDIOTHORACIC VASCULAR SURGERY)

## 2021-03-21 PROCEDURE — 25010000002 FUROSEMIDE PER 20 MG: Performed by: THORACIC SURGERY (CARDIOTHORACIC VASCULAR SURGERY)

## 2021-03-21 PROCEDURE — 85027 COMPLETE CBC AUTOMATED: CPT | Performed by: THORACIC SURGERY (CARDIOTHORACIC VASCULAR SURGERY)

## 2021-03-21 PROCEDURE — 97116 GAIT TRAINING THERAPY: CPT

## 2021-03-21 RX ORDER — AMIODARONE HYDROCHLORIDE 200 MG/1
400 TABLET ORAL 2 TIMES DAILY WITH MEALS
Status: DISCONTINUED | OUTPATIENT
Start: 2021-03-22 | End: 2021-03-22

## 2021-03-21 RX ORDER — BISACODYL 5 MG/1
10 TABLET, DELAYED RELEASE ORAL 2 TIMES DAILY
Status: DISCONTINUED | OUTPATIENT
Start: 2021-03-21 | End: 2021-03-24 | Stop reason: HOSPADM

## 2021-03-21 RX ORDER — POLYETHYLENE GLYCOL 3350 17 G/17G
17 POWDER, FOR SOLUTION ORAL DAILY
Status: DISCONTINUED | OUTPATIENT
Start: 2021-03-22 | End: 2021-03-24 | Stop reason: HOSPADM

## 2021-03-21 RX ORDER — IPRATROPIUM BROMIDE AND ALBUTEROL SULFATE 2.5; .5 MG/3ML; MG/3ML
1.5 SOLUTION RESPIRATORY (INHALATION)
Status: DISCONTINUED | OUTPATIENT
Start: 2021-03-22 | End: 2021-03-24

## 2021-03-21 RX ADMIN — LIDOCAINE 2 PATCH: 50 PATCH CUTANEOUS at 08:55

## 2021-03-21 RX ADMIN — CHLORHEXIDINE GLUCONATE 0.12% ORAL RINSE 15 ML: 1.2 LIQUID ORAL at 05:50

## 2021-03-21 RX ADMIN — OXYCODONE HYDROCHLORIDE AND ACETAMINOPHEN 1 TABLET: 5; 325 TABLET ORAL at 10:42

## 2021-03-21 RX ADMIN — OXYCODONE HYDROCHLORIDE AND ACETAMINOPHEN 1 TABLET: 10; 325 TABLET ORAL at 04:36

## 2021-03-21 RX ADMIN — POTASSIUM CHLORIDE 20 MEQ: 750 CAPSULE, EXTENDED RELEASE ORAL at 08:14

## 2021-03-21 RX ADMIN — PREGABALIN 25 MG: 25 CAPSULE ORAL at 21:54

## 2021-03-21 RX ADMIN — POTASSIUM CHLORIDE 20 MEQ: 750 CAPSULE, EXTENDED RELEASE ORAL at 17:23

## 2021-03-21 RX ADMIN — INSULIN LISPRO 8 UNITS: 100 INJECTION, SOLUTION INTRAVENOUS; SUBCUTANEOUS at 17:23

## 2021-03-21 RX ADMIN — OXYCODONE HYDROCHLORIDE AND ACETAMINOPHEN 1 TABLET: 5; 325 TABLET ORAL at 21:55

## 2021-03-21 RX ADMIN — METHOCARBAMOL 500 MG: 500 TABLET, FILM COATED ORAL at 14:15

## 2021-03-21 RX ADMIN — OXYCODONE HYDROCHLORIDE AND ACETAMINOPHEN 1 TABLET: 10; 325 TABLET ORAL at 14:52

## 2021-03-21 RX ADMIN — FUROSEMIDE 20 MG: 10 INJECTION, SOLUTION INTRAVENOUS at 17:23

## 2021-03-21 RX ADMIN — METHOCARBAMOL 500 MG: 500 TABLET, FILM COATED ORAL at 06:33

## 2021-03-21 RX ADMIN — ATORVASTATIN CALCIUM 20 MG: 10 TABLET, FILM COATED ORAL at 21:49

## 2021-03-21 RX ADMIN — METHOCARBAMOL 500 MG: 500 TABLET, FILM COATED ORAL at 21:49

## 2021-03-21 RX ADMIN — ENOXAPARIN SODIUM 30 MG: 30 INJECTION SUBCUTANEOUS at 21:55

## 2021-03-21 RX ADMIN — AMIODARONE HYDROCHLORIDE 200 MG: 200 TABLET ORAL at 12:23

## 2021-03-21 RX ADMIN — ENOXAPARIN SODIUM 30 MG: 30 INJECTION SUBCUTANEOUS at 08:14

## 2021-03-21 RX ADMIN — LEVOTHYROXINE SODIUM 150 MCG: 150 TABLET ORAL at 05:50

## 2021-03-21 RX ADMIN — OXYCODONE HYDROCHLORIDE AND ACETAMINOPHEN 1 TABLET: 5; 325 TABLET ORAL at 17:33

## 2021-03-21 RX ADMIN — AMIODARONE HYDROCHLORIDE 200 MG: 200 TABLET ORAL at 17:22

## 2021-03-21 RX ADMIN — IPRATROPIUM BROMIDE AND ALBUTEROL SULFATE 3 ML: 2.5; .5 SOLUTION RESPIRATORY (INHALATION) at 06:36

## 2021-03-21 RX ADMIN — PREGABALIN 25 MG: 25 CAPSULE ORAL at 08:14

## 2021-03-21 RX ADMIN — FUROSEMIDE 20 MG: 10 INJECTION, SOLUTION INTRAVENOUS at 08:14

## 2021-03-21 RX ADMIN — MUPIROCIN 1 APPLICATION: 20 OINTMENT TOPICAL at 05:50

## 2021-03-21 RX ADMIN — IPRATROPIUM BROMIDE AND ALBUTEROL SULFATE 3 ML: 2.5; .5 SOLUTION RESPIRATORY (INHALATION) at 14:17

## 2021-03-21 RX ADMIN — ASPIRIN 81 MG: 81 TABLET, CHEWABLE ORAL at 08:14

## 2021-03-21 RX ADMIN — METOPROLOL TARTRATE 12.5 MG: 25 TABLET, FILM COATED ORAL at 21:49

## 2021-03-21 RX ADMIN — METOPROLOL TARTRATE 12.5 MG: 25 TABLET, FILM COATED ORAL at 08:55

## 2021-03-21 RX ADMIN — BISACODYL 10 MG: 5 TABLET ORAL at 21:56

## 2021-03-21 RX ADMIN — INSULIN LISPRO 8 UNITS: 100 INJECTION, SOLUTION INTRAVENOUS; SUBCUTANEOUS at 08:14

## 2021-03-21 RX ADMIN — INSULIN DETEMIR 30 UNITS: 100 INJECTION, SOLUTION SUBCUTANEOUS at 22:33

## 2021-03-21 RX ADMIN — INSULIN LISPRO 8 UNITS: 100 INJECTION, SOLUTION INTRAVENOUS; SUBCUTANEOUS at 12:23

## 2021-03-21 RX ADMIN — CEFAZOLIN SODIUM 2 G: 10 INJECTION, POWDER, FOR SOLUTION INTRAVENOUS at 04:36

## 2021-03-21 RX ADMIN — IPRATROPIUM BROMIDE AND ALBUTEROL SULFATE 3 ML: 2.5; .5 SOLUTION RESPIRATORY (INHALATION) at 10:13

## 2021-03-21 RX ADMIN — IPRATROPIUM BROMIDE AND ALBUTEROL SULFATE 3 ML: 2.5; .5 SOLUTION RESPIRATORY (INHALATION) at 20:19

## 2021-03-21 RX ADMIN — CLOPIDOGREL 75 MG: 75 TABLET, FILM COATED ORAL at 17:23

## 2021-03-21 RX ADMIN — OXYCODONE HYDROCHLORIDE AND ACETAMINOPHEN 1 TABLET: 10; 325 TABLET ORAL at 01:03

## 2021-03-21 RX ADMIN — AMIODARONE HYDROCHLORIDE 200 MG: 200 TABLET ORAL at 05:50

## 2021-03-21 NOTE — THERAPY TREATMENT NOTE
Acute Care - Physical Therapy Treatment Note  [facility]     Patient Name: Krys Cox  : 1970  MRN: [id number]  Today's Date: 3/21/2021           PT Assessment (last 12 hours)      PT Evaluation and Treatment     Row Name 21 0810          Physical Therapy Time and Intention    Subjective Information  complains of;pain  -TB     Document Type  therapy note (daily note)  -TB     Mode of Treatment  physical therapy  -TB     Patient Effort  good  -TB     Comment  3 prevena vacs; chest tube  -TB     Row Name 21 0810          General Information    Existing Precautions/Restrictions  cardiac;fall;sternal  -TB     Barriers to Rehab  medically complex  -TB     Row Name 21 0810          Pain Scale: Numbers Pre/Post-Treatment    Pretreatment Pain Rating  2/10  -TB     Posttreatment Pain Rating  2/10  -TB     Pain Location - Orientation  incisional  -TB     Pain Location  chest  -TB     Row Name 21 0810          Bed Mobility    Comment (Bed Mobility)  Up in chair  -TB     Row Name 21 0810          Transfers    Transfers  sit-stand transfer;stand-sit transfer  -TB     Sit-Stand [place] (Transfers)  minimum assist (75% patient effort);2 person assist;verbal cues  -TB     Stand-Sit [place] (Transfers)  contact guard;verbal cues  -TB     Row Name 21 0810          Gait/Stairs (Locomotion)    [place] Level (Gait)  minimum assist (75% patient effort);2 person assist;1 person to manage equipment;verbal cues  -TB     Distance in Feet (Gait)  80' x2  -TB     Deviations/Abnormal Patterns (Gait)  gait speed decreased;stride length decreased  -TB     Comment (Gait/Stairs)  C/o leg pain during amb  -TB     Row Name 21 0810          Motor Skills    Therapeutic Exercise  aerobic  -TB     Row Name 21 0810          Aerobic Exercise    Type (Aerobic Exercise)  other (see comments) Warm up/cool down BUE/BLEs [id number]  -TB     Row Name             Wound 21 0959 Right  "lower leg    Wound - Properties Group Placement Date: 03/19/21  -KR Placement Time: 0959  -KR Present on Hospital Admission: N  -KR Side: Right  -KR Orientation: lower  -KR Location: leg  -KR    Retired Wound - Properties Group Date first assessed: 03/19/21  -KR Time first assessed: 0959  -KR Present on Hospital Admission: N  -KR Side: Right  -KR Location: leg  -KR    Row Name             Wound 03/19/21 0959 Left lower leg    Wound - Properties Group Placement Date: 03/19/21  -KR Placement Time: 0959  -KR Present on Hospital Admission: N  -KR Side: Left  -KR Orientation: lower  -KR Location: leg  -KR    Retired Wound - Properties Group Date first assessed: 03/19/21  -KR Time first assessed: 0959  -KR Present on Hospital Admission: N  -KR Side: Left  -KR Location: leg  -KR    Row Name             Wound 03/19/21 0959 anterior sternal    Wound - Properties Group Placement Date: 03/19/21  -KR Placement Time: 0959  -KR Present on Hospital Admission: N  -KR Orientation: anterior  -KR Location: sternal  -KR    Retired Wound - Properties Group Date first assessed: 03/19/21  -KR Time first assessed: 0959  -KR Present on Hospital Admission: N  -KR Location: sternal  -KR    Row Name 03/21/21 0810          Plan of Care Review    Plan of Care Reviewed With  patient  -TB     Progress  improving  -TB     Outcome Summary  Pt agreeable to therapy w/ c/o pain at 2/10. Sit<>stand min x2. Amb 80'x2 w/ c/o pain in BLE. Pt stated they \"felt heavy\". Pt performed warm up/cool down exercises x10 each. Pts wound vacs were checked and intact w/ no drainage noted. Will cont. to progress as pt tolerates.  -TB     Row Name 03/21/21 0810          Positioning and Restraints    Pre-Treatment Position  sitting in chair/recliner  -TB     Post Treatment Position  chair  -TB     In Chair  reclined;sitting;call light within reach;encouraged to call for assist;heels elevated;legs elevated  -TB       User Key  (r) = Recorded By, (t) = Taken By, (c) = " "Cosigned By    Initials Name Provider Type    TB Whitney Cesar, PTA Physical Therapy Assistant    Tito Klein --        Physical Therapy Education                 Title: PT OT SLP Therapies (Done)     Topic: Physical Therapy (Done)     Point: Mobility training (Done)     Learning Progress Summary           Patient Acceptance, E,TB,D, VU,DU,NR by  at 3/20/2021 1216    Comment: Education re: purpose of PT/importance of activity; education for safety/falls prevention; education for sternal precautions, proper tech w/ tfers, deep breathing and improved gait mechanics                   Point: Home exercise program (Done)     Learning Progress Summary           Patient Acceptance, E,TB,D, VU,DU,NR by  at 3/20/2021 1216    Comment: Education re: purpose of PT/importance of activity; education for safety/falls prevention; education for sternal precautions, proper tech w/ tfers, deep breathing and improved gait mechanics                   Point: Precautions (Done)     Learning Progress Summary           Patient Acceptance, E,TB,D, VU,DU,NR by  at 3/20/2021 1216    Comment: Education re: purpose of PT/importance of activity; education for safety/falls prevention; education for sternal precautions, proper tech w/ tfers, deep breathing and improved gait mechanics                               User Key     Initials Effective Dates Name Provider Type Discipline     08/02/18 -  Judith Kimball, PT Physical Therapist PT              PT Recommendation and Plan     Plan of Care Reviewed With: patient  Progress: improving  Outcome Summary: Pt agreeable to therapy w/ c/o pain at 2/10. Sit<>stand min x2. Amb 80'x2 w/ c/o pain in BLE. Pt stated they \"felt heavy\". Pt performed warm up/cool down exercises x10 each. Pts wound vacs were checked and intact w/ no drainage noted. Will cont. to progress as pt tolerates.       Time Calculation:   PT Charges     Row Name 03/21/21 8217             Time Calculation    Start " Time  0810  -TB      Stop Time  0833  -TB      Time Calculation (min)  23 min  -TB      PT Received On  03/21/21  -TB         Time Calculation- PT    Total Timed Code Minutes- PT  23 minute(s)  -TB        User Key  (r) = Recorded By, (t) = Taken By, (c) = Cosigned By    Initials Name Provider Type    TB Whitney Cesar, JAIME Physical Therapy Assistant        Therapy Charges for Today     Code Description Service Date Service Provider Modifiers Qty    82010576423 HC GAIT TRAINING EA 15 MIN 3/21/2021 Whitney Cesar, PTA GP 1    00765634107 HC PT THER PROC EA 15 MIN 3/21/2021 Whitney Cesar, PTA GP 1          PT G-Codes  Outcome Measure Options: AM-PAC 6 Clicks Basic Mobility (PT)  AM-PAC 6 Clicks Score (PT): 12    Whitney Cesar PTA  3/21/2021

## 2021-03-21 NOTE — PROGRESS NOTES
"CABG-3V (LIMA/LAD, RSVG/OM, and RSVG/PDA) B open vein harvest, prevena incision management system to sternum, B LE harvest incisions    Extubated wo remark.  Pain control early on problematic  Better during rounds.  4/10  Walked around icu,      Visit Vitals  /53 (BP Location: Right arm, Patient Position: Lying)   Pulse 86   Temp 98.6 °F (37 °C) (Oral)   Resp 24   Ht 157.5 cm (62\")   Wt 115 kg (253 lb 8 oz)   LMP 03/17/2020 (Approximate)   SpO2 93%   Breastfeeding No   BMI 46.37 kg/m²         Intake/Output Summary (Last 24 hours) at 3/20/2021 2304  Last data filed at 3/20/2021 2146  Gross per 24 hour   Intake 5382.26 ml   Output 820 ml   Net 4562.26 ml     MCT: 165 ml/24 hour  L amilcar: 90 ml/24 hours      Physical Exam:    General: No acute distress, in good spirits, up in chair  Cardiovascular: RRR, no murmur, rubs, or gallops.    Pulmonary: Clear to auscultation bilaterally, no wheezing, rubs, or rales.  Chest: prevena c/d/i  Abdomen: Soft, non distended, and non tender.  Extremities: Warm, moves all extremities. B prevena c/d/i  Neurologic:  No focal deficits, CN II-XII intact grossly.      Impression:  CAD  NSTEMI  Obesity, class III  Poorly controlled diabetes mellitus    Medical decision-making/recommendations/plan:  Usual cardiac surgery post operative care  Leukocytosis  Transfer to the floor   "

## 2021-03-21 NOTE — PROGRESS NOTES
"CABG-3V (LIMA/LAD, RSVG/OM, and RSVG/PDA) B open vein harvest, prevena incision management system to sternum, B LE harvest incisions  POD 2    Had additional pain last night more so described as muscle spasms.  Multimodality pain regiment was augmented.  Although she did walk around the ICU yesterday she is ambulated 80 feet with a break and then additional 80 feet.  Feeling better    Visit Vitals  /43 (BP Location: Right arm, Patient Position: Lying)   Pulse 73   Temp 98.4 °F (36.9 °C) (Oral)   Resp 18   Ht 157.5 cm (62\")   Wt 116 kg (255 lb 3.2 oz)   LMP 03/17/2020 (Approximate)   SpO2 91%   Breastfeeding No   BMI 46.68 kg/m²   RA    Baseline weight: 234 pounds      Intake/Output Summary (Last 24 hours) at 3/21/2021 1122  Last data filed at 3/21/2021 0900  Gross per 24 hour   Intake 2744.42 ml   Output 820 ml   Net 1924.42 ml     MCT: 165 ml/24 hour  L amilcar: 90 ml/24 hours    Labs:         CXR:   No obvious pneumothorax.  Reasonably well-expanded lungs.  There is associated bibasilar atelectasis.    Physical Exam:    General: No acute distress, in good spirits, up in chair  Cardiovascular: RRR, no murmur, rubs, or gallops.    Pulmonary: Clear to auscultation bilaterally, no wheezing, rubs, or rales.  Chest: prevena c/d/i  Abdomen: Soft, non distended, and non tender.  Extremities: Warm, moves all extremities. B prevena c/d/i  Neurologic:  No focal deficits, CN II-XII intact grossly.      Impression:  CAD  NSTEMI  Obesity, class III  Poorly controlled diabetes mellitus  Hyponatremia, asymptomatic  Leukocytosis      Medical decision-making/recommendations/plan:  Usual cardiac surgery post operative care  Add long acting insulin  DC MCT and left amilcar.    Add bowel regimen  Encourage ambulation and pulmonary toilet    DW patient and nursing.    "

## 2021-03-21 NOTE — PLAN OF CARE
"Goal Outcome Evaluation:  Plan of Care Reviewed With: patient  Progress: improving  Outcome Summary: Pt agreeable to therapy w/ c/o pain at 2/10. Sit<>stand min x2. Amb 80'x2 w/ c/o pain in BLE. Pt stated they \"felt heavy\". Pt performed warm up/cool down exercises x10 each. Pts wound vacs were checked and intact w/ no drainage noted. Will cont. to progress as pt tolerates.  "

## 2021-03-21 NOTE — THERAPY TREATMENT NOTE
Acute Care - Physical Therapy Treatment Note  [facility]     Patient Name: Krys Cox  : 1970  MRN: [id number]  Today's Date: 3/21/2021           PT Assessment (last 12 hours)      PT Evaluation and Treatment     Row Name 21 1427 21 0810       Physical Therapy Time and Intention    Subjective Information  complains of;pain  -TB  complains of;pain  -TB    Document Type  therapy note (daily note)  -TB  therapy note (daily note)  -TB    Mode of Treatment  physical therapy  -TB  physical therapy  -TB    Patient Effort  good  -TB  good  -TB    Comment  --  3 prevena vacs; chest tube  -TB    Row Name 21 1427 21 0810       General Information    Existing Precautions/Restrictions  cardiac;fall;sternal  -TB  cardiac;fall;sternal  -TB    Barriers to Rehab  --  medically complex  -TB    Row Name 21 1427 21 0810       Pain Scale: Numbers Pre/Post-Treatment    Pretreatment Pain Rating  2/10  -TB  2/10  -TB    Posttreatment Pain Rating  10/10  -TB  2/10  -TB    Pain Location - Orientation  incisional  -TB  incisional  -TB    Pain Location  chest  -TB  chest  -TB    Pre/Posttreatment Pain Comment  Pain in back during amb  -TB  --    Row Name 21 1427 21 0810       Bed Mobility    Comment (Bed Mobility)  Up in chair  -TB  Up in chair  -TB    Row Name 21 1427 21 0810       Transfers    Transfers  sit-stand transfer;stand-sit transfer  -TB  sit-stand transfer;stand-sit transfer  -TB    Sit-Stand [place] (Transfers)  minimum assist (75% patient effort);verbal cues  -TB  minimum assist (75% patient effort);2 person assist;verbal cues  -TB    Stand-Sit [place] (Transfers)  contact guard;verbal cues  -TB  contact guard;verbal cues  -TB    Row Name 21 1427 21 0810       Gait/Stairs (Locomotion)    [place] Level (Gait)  minimum assist (75% patient effort);verbal cues  -TB  minimum assist (75% patient effort);2 person assist;1 person to  manage equipment;verbal cues  -TB    Distance in Feet (Gait)  80' x2  -TB  80' x2  -TB    Deviations/Abnormal Patterns (Gait)  gait speed decreased;stride length decreased  -TB  gait speed decreased;stride length decreased  -TB    Bilateral Gait Deviations  heel strike decreased  -TB  --    Comment (Gait/Stairs)  Pt started hurting severly in her back during amb. RN notified  -TB  C/o leg pain during amb  -TB    Row Name 03/21/21 1427 03/21/21 0810       Motor Skills    Therapeutic Exercise  aerobic  -TB  aerobic  -TB    Row Name 03/21/21 1427 03/21/21 0810       Aerobic Exercise    Type (Aerobic Exercise)  other (see comments) Warm up/cool down x15  -TB  other (see comments) Warm up/cool down BUE/BLEs [id number]  -TB    Row Name             Wound 03/19/21 0959 Right lower leg    Wound - Properties Group Placement Date: 03/19/21  -KR Placement Time: 0959 -KR Present on Hospital Admission: N  -KR Side: Right  -KR Orientation: lower  -KR Location: leg  -KR    Retired Wound - Properties Group Date first assessed: 03/19/21  -KR Time first assessed: 0959 -KR Present on Hospital Admission: N  -KR Side: Right  -KR Location: leg  -KR    Row Name             Wound 03/19/21 0959 Left lower leg    Wound - Properties Group Placement Date: 03/19/21  -KR Placement Time: 0959 -KR Present on Hospital Admission: N  -KR Side: Left  -KR Orientation: lower  -KR Location: leg  -KR    Retired Wound - Properties Group Date first assessed: 03/19/21  -KR Time first assessed: 0959 -KR Present on Hospital Admission: N  -KR Side: Left  -KR Location: leg  -KR    Row Name             Wound 03/19/21 0959 anterior sternal    Wound - Properties Group Placement Date: 03/19/21  -KR Placement Time: 0959 -KR Present on Hospital Admission: N  -KR Orientation: anterior  -KR Location: sternal  -KR    Retired Wound - Properties Group Date first assessed: 03/19/21  -KR Time first assessed: 0959  -KR Present on Hospital Admission: N  -KR Location: sternal   "-KR    Row Name 03/21/21 0810          Plan of Care Review    Plan of Care Reviewed With  patient  -TB     Progress  improving  -TB     Outcome Summary  Pt agreeable to therapy w/ c/o pain at 2/10. Sit<>stand min x2. Amb 80'x2 w/ c/o pain in BLE. Pt stated they \"felt heavy\". Pt performed warm up/cool down exercises x10 each. Pts wound vacs were checked and intact w/ no drainage noted. Will cont. to progress as pt tolerates.  -TB     Row Name 03/21/21 1427 03/21/21 0810       Positioning and Restraints    Pre-Treatment Position  sitting in chair/recliner  -TB  sitting in chair/recliner  -TB    Post Treatment Position  chair  -TB  chair  -TB    In Chair  notified nsg;reclined;sitting;call light within reach;encouraged to call for assist;with family/caregiver;legs elevated  -TB  reclined;sitting;call light within reach;encouraged to call for assist;heels elevated;legs elevated  -TB      User Key  (r) = Recorded By, (t) = Taken By, (c) = Cosigned By    Initials Name Provider Type    TB Whitney Cesar R, PTA Physical Therapy Assistant    Tito Klein --        Physical Therapy Education                 Title: PT OT SLP Therapies (Done)     Topic: Physical Therapy (Done)     Point: Mobility training (Done)     Learning Progress Summary           Patient Acceptance, E,TB,D, VU,DU,NR by  at 3/20/2021 1216    Comment: Education re: purpose of PT/importance of activity; education for safety/falls prevention; education for sternal precautions, proper tech w/ tfers, deep breathing and improved gait mechanics                   Point: Home exercise program (Done)     Learning Progress Summary           Patient Acceptance, E,TB,D, VU,DU,NR by  at 3/20/2021 1216    Comment: Education re: purpose of PT/importance of activity; education for safety/falls prevention; education for sternal precautions, proper tech w/ tfers, deep breathing and improved gait mechanics                   Point: Precautions (Done)     Learning " "Progress Summary           Patient Acceptance, E,TB,D, VU,DU,NR by  at 3/20/2021 1216    Comment: Education re: purpose of PT/importance of activity; education for safety/falls prevention; education for sternal precautions, proper tech w/ tfers, deep breathing and improved gait mechanics                               User Key     Initials Effective Dates Name Provider Type Discipline     08/02/18 -  Judith Kimball, PT Physical Therapist PT              PT Recommendation and Plan     Plan of Care Reviewed With: patient  Progress: improving  Outcome Summary: Pt agreeable to therapy w/ c/o pain at 2/10. Sit<>stand min x2. Amb 80'x2 w/ c/o pain in BLE. Pt stated they \"felt heavy\". Pt performed warm up/cool down exercises x10 each. Pts wound vacs were checked and intact w/ no drainage noted. Will cont. to progress as pt tolerates.       Time Calculation:   PT Charges     Row Name 03/21/21 1453 03/21/21 0947          Time Calculation    Start Time  1427  -TB  0810  -TB     Stop Time  1450  -TB  0833  -TB     Time Calculation (min)  23 min  -TB  23 min  -TB     PT Received On  03/21/21  -TB  03/21/21  -TB        Time Calculation- PT    Total Timed Code Minutes- PT  23 minute(s)  -TB  23 minute(s)  -TB       User Key  (r) = Recorded By, (t) = Taken By, (c) = Cosigned By    Initials Name Provider Type    TB Whitney Cesar, PTA Physical Therapy Assistant        Therapy Charges for Today     Code Description Service Date Service Provider Modifiers Qty    34873172731 HC GAIT TRAINING EA 15 MIN 3/21/2021 Whitney Cesar, PTA GP 1    64732951259 HC PT THER PROC EA 15 MIN 3/21/2021 Whitney Cesar, PTA GP 1    45459004952 HC GAIT TRAINING EA 15 MIN 3/21/2021 Whitney Cesar, PTA GP 1    [id number] HC PT THER PROC EA 15 MIN 3/21/2021 Whitney Cesar, PTA GP 1          PT G-Codes  Outcome Measure Options: AM-PAC 6 Clicks Basic Mobility (PT)  AM-PAC 6 Clicks Score (PT): 12    Whitney SHELTON " Arsenio, PTA  3/21/2021

## 2021-03-21 NOTE — PLAN OF CARE
Goal Outcome Evaluation:  Plan of Care Reviewed With: patient     Outcome Summary: patient c/o pain and did do some moaning today, wound vac to ble's and sternum intact, chest tubes and amilcar drain pulled, v wire taped and isolated, bra removed to look for skin irritation, walked x4,pulled chan and patient has voided small amount since(100ml), nsr in 70's

## 2021-03-22 ENCOUNTER — APPOINTMENT (OUTPATIENT)
Dept: GENERAL RADIOLOGY | Facility: HOSPITAL | Age: 51
End: 2021-03-22

## 2021-03-22 LAB
ANION GAP SERPL CALCULATED.3IONS-SCNC: 8 MMOL/L (ref 5–15)
BH BB BLOOD EXPIRATION DATE: NORMAL
BH BB BLOOD EXPIRATION DATE: NORMAL
BH BB BLOOD TYPE BARCODE: 8400
BH BB BLOOD TYPE BARCODE: 8400
BH BB DISPENSE STATUS: NORMAL
BH BB DISPENSE STATUS: NORMAL
BH BB PRODUCT CODE: NORMAL
BH BB PRODUCT CODE: NORMAL
BH BB UNIT NUMBER: NORMAL
BH BB UNIT NUMBER: NORMAL
BUN SERPL-MCNC: 25 MG/DL (ref 6–20)
BUN/CREAT SERPL: 33.3 (ref 7–25)
CALCIUM SPEC-SCNC: 8.8 MG/DL (ref 8.6–10.5)
CHLORIDE SERPL-SCNC: 96 MMOL/L (ref 98–107)
CO2 SERPL-SCNC: 25 MMOL/L (ref 22–29)
CREAT SERPL-MCNC: 0.75 MG/DL (ref 0.57–1)
CROSSMATCH INTERPRETATION: NORMAL
CROSSMATCH INTERPRETATION: NORMAL
DEPRECATED RDW RBC AUTO: 39.8 FL (ref 37–54)
ERYTHROCYTE [DISTWIDTH] IN BLOOD BY AUTOMATED COUNT: 13.1 % (ref 12.3–15.4)
GFR SERPL CREATININE-BSD FRML MDRD: 82 ML/MIN/1.73
GLUCOSE BLDC GLUCOMTR-MCNC: 257 MG/DL (ref 70–130)
GLUCOSE BLDC GLUCOMTR-MCNC: 267 MG/DL (ref 70–130)
GLUCOSE BLDC GLUCOMTR-MCNC: 284 MG/DL (ref 70–130)
GLUCOSE BLDC GLUCOMTR-MCNC: 367 MG/DL (ref 70–130)
GLUCOSE SERPL-MCNC: 260 MG/DL (ref 65–99)
HCT VFR BLD AUTO: 29.2 % (ref 34–46.6)
HGB BLD-MCNC: 9.6 G/DL (ref 12–15.9)
MCH RBC QN AUTO: 27.4 PG (ref 26.6–33)
MCHC RBC AUTO-ENTMCNC: 32.9 G/DL (ref 31.5–35.7)
MCV RBC AUTO: 83.2 FL (ref 79–97)
PLATELET # BLD AUTO: 336 10*3/MM3 (ref 140–450)
PMV BLD AUTO: 11.1 FL (ref 6–12)
POTASSIUM SERPL-SCNC: 4.9 MMOL/L (ref 3.5–5.2)
RBC # BLD AUTO: 3.51 10*6/MM3 (ref 3.77–5.28)
SODIUM SERPL-SCNC: 129 MMOL/L (ref 136–145)
UNIT  ABO: NORMAL
UNIT  ABO: NORMAL
UNIT  RH: NORMAL
UNIT  RH: NORMAL
WBC # BLD AUTO: 15.75 10*3/MM3 (ref 3.4–10.8)
WHOLE BLOOD HOLD SPECIMEN: NORMAL

## 2021-03-22 PROCEDURE — 82962 GLUCOSE BLOOD TEST: CPT

## 2021-03-22 PROCEDURE — 80048 BASIC METABOLIC PNL TOTAL CA: CPT | Performed by: THORACIC SURGERY (CARDIOTHORACIC VASCULAR SURGERY)

## 2021-03-22 PROCEDURE — 63710000001 INSULIN DETEMIR PER 5 UNITS: Performed by: NURSE PRACTITIONER

## 2021-03-22 PROCEDURE — 25010000002 ENOXAPARIN PER 10 MG: Performed by: THORACIC SURGERY (CARDIOTHORACIC VASCULAR SURGERY)

## 2021-03-22 PROCEDURE — 71046 X-RAY EXAM CHEST 2 VIEWS: CPT

## 2021-03-22 PROCEDURE — 85027 COMPLETE CBC AUTOMATED: CPT | Performed by: THORACIC SURGERY (CARDIOTHORACIC VASCULAR SURGERY)

## 2021-03-22 PROCEDURE — 97530 THERAPEUTIC ACTIVITIES: CPT

## 2021-03-22 PROCEDURE — 63710000001 INSULIN LISPRO (HUMAN) PER 5 UNITS: Performed by: THORACIC SURGERY (CARDIOTHORACIC VASCULAR SURGERY)

## 2021-03-22 PROCEDURE — 25010000002 ONDANSETRON PER 1 MG: Performed by: THORACIC SURGERY (CARDIOTHORACIC VASCULAR SURGERY)

## 2021-03-22 PROCEDURE — 25010000002 FUROSEMIDE PER 20 MG: Performed by: THORACIC SURGERY (CARDIOTHORACIC VASCULAR SURGERY)

## 2021-03-22 PROCEDURE — 94799 UNLISTED PULMONARY SVC/PX: CPT

## 2021-03-22 PROCEDURE — 99024 POSTOP FOLLOW-UP VISIT: CPT | Performed by: NURSE PRACTITIONER

## 2021-03-22 PROCEDURE — 97116 GAIT TRAINING THERAPY: CPT

## 2021-03-22 PROCEDURE — 63710000001 INSULIN LISPRO (HUMAN) PER 5 UNITS: Performed by: NURSE PRACTITIONER

## 2021-03-22 RX ORDER — POTASSIUM CHLORIDE 750 MG/1
20 CAPSULE, EXTENDED RELEASE ORAL
Status: DISCONTINUED | OUTPATIENT
Start: 2021-03-22 | End: 2021-03-24 | Stop reason: HOSPADM

## 2021-03-22 RX ORDER — FUROSEMIDE 10 MG/ML
20 INJECTION INTRAMUSCULAR; INTRAVENOUS
Status: DISCONTINUED | OUTPATIENT
Start: 2021-03-22 | End: 2021-03-22

## 2021-03-22 RX ORDER — FUROSEMIDE 10 MG/ML
40 INJECTION INTRAMUSCULAR; INTRAVENOUS
Status: DISCONTINUED | OUTPATIENT
Start: 2021-03-22 | End: 2021-03-24 | Stop reason: HOSPADM

## 2021-03-22 RX ORDER — BISACODYL 10 MG
10 SUPPOSITORY, RECTAL RECTAL DAILY PRN
Status: DISCONTINUED | OUTPATIENT
Start: 2021-03-22 | End: 2021-03-24 | Stop reason: HOSPADM

## 2021-03-22 RX ORDER — ACETAMINOPHEN 325 MG/1
650 TABLET ORAL EVERY 6 HOURS SCHEDULED
Status: DISCONTINUED | OUTPATIENT
Start: 2021-03-22 | End: 2021-03-24 | Stop reason: HOSPADM

## 2021-03-22 RX ADMIN — ACETAMINOPHEN 650 MG: 325 TABLET ORAL at 17:32

## 2021-03-22 RX ADMIN — ACETAMINOPHEN 650 MG: 325 TABLET ORAL at 23:50

## 2021-03-22 RX ADMIN — INSULIN DETEMIR 40 UNITS: 100 INJECTION, SOLUTION SUBCUTANEOUS at 21:57

## 2021-03-22 RX ADMIN — ACETAMINOPHEN 650 MG: 325 TABLET ORAL at 12:18

## 2021-03-22 RX ADMIN — OXYCODONE HYDROCHLORIDE AND ACETAMINOPHEN 1 TABLET: 5; 325 TABLET ORAL at 09:36

## 2021-03-22 RX ADMIN — PREGABALIN 25 MG: 25 CAPSULE ORAL at 21:59

## 2021-03-22 RX ADMIN — POLYETHYLENE GLYCOL (3350) 17 G: 17 POWDER, FOR SOLUTION ORAL at 09:00

## 2021-03-22 RX ADMIN — AMIODARONE HYDROCHLORIDE 400 MG: 200 TABLET ORAL at 09:02

## 2021-03-22 RX ADMIN — IPRATROPIUM BROMIDE AND ALBUTEROL SULFATE 1.5 ML: 2.5; .5 SOLUTION RESPIRATORY (INHALATION) at 06:37

## 2021-03-22 RX ADMIN — CLOPIDOGREL 75 MG: 75 TABLET, FILM COATED ORAL at 17:32

## 2021-03-22 RX ADMIN — METOPROLOL TARTRATE 25 MG: 25 TABLET, FILM COATED ORAL at 21:59

## 2021-03-22 RX ADMIN — ENOXAPARIN SODIUM 30 MG: 30 INJECTION SUBCUTANEOUS at 21:59

## 2021-03-22 RX ADMIN — BISACODYL 10 MG: 10 SUPPOSITORY RECTAL at 11:10

## 2021-03-22 RX ADMIN — ENOXAPARIN SODIUM 30 MG: 30 INJECTION SUBCUTANEOUS at 09:01

## 2021-03-22 RX ADMIN — METHOCARBAMOL 500 MG: 500 TABLET, FILM COATED ORAL at 21:59

## 2021-03-22 RX ADMIN — METHOCARBAMOL 500 MG: 500 TABLET, FILM COATED ORAL at 13:31

## 2021-03-22 RX ADMIN — ONDANSETRON HYDROCHLORIDE 4 MG: 2 SOLUTION INTRAMUSCULAR; INTRAVENOUS at 04:38

## 2021-03-22 RX ADMIN — FUROSEMIDE 40 MG: 10 INJECTION, SOLUTION INTRAMUSCULAR; INTRAVENOUS at 17:31

## 2021-03-22 RX ADMIN — POTASSIUM CHLORIDE 20 MEQ: 750 CAPSULE, EXTENDED RELEASE ORAL at 17:32

## 2021-03-22 RX ADMIN — POTASSIUM CHLORIDE 20 MEQ: 750 CAPSULE, EXTENDED RELEASE ORAL at 09:02

## 2021-03-22 RX ADMIN — OXYCODONE HYDROCHLORIDE AND ACETAMINOPHEN 1 TABLET: 10; 325 TABLET ORAL at 21:59

## 2021-03-22 RX ADMIN — INSULIN LISPRO 6 UNITS: 100 INJECTION, SOLUTION INTRAVENOUS; SUBCUTANEOUS at 09:01

## 2021-03-22 RX ADMIN — IPRATROPIUM BROMIDE AND ALBUTEROL SULFATE 1.5 ML: 2.5; .5 SOLUTION RESPIRATORY (INHALATION) at 12:30

## 2021-03-22 RX ADMIN — BISACODYL 10 MG: 5 TABLET ORAL at 21:59

## 2021-03-22 RX ADMIN — INSULIN LISPRO 6 UNITS: 100 INJECTION, SOLUTION INTRAVENOUS; SUBCUTANEOUS at 17:31

## 2021-03-22 RX ADMIN — INSULIN LISPRO 8 UNITS: 100 INJECTION, SOLUTION INTRAVENOUS; SUBCUTANEOUS at 12:18

## 2021-03-22 RX ADMIN — FUROSEMIDE 20 MG: 10 INJECTION, SOLUTION INTRAVENOUS at 09:00

## 2021-03-22 RX ADMIN — METHOCARBAMOL 500 MG: 500 TABLET, FILM COATED ORAL at 06:10

## 2021-03-22 RX ADMIN — BISACODYL 10 MG: 5 TABLET ORAL at 09:01

## 2021-03-22 RX ADMIN — METOPROLOL TARTRATE 25 MG: 25 TABLET, FILM COATED ORAL at 09:02

## 2021-03-22 RX ADMIN — LEVOTHYROXINE SODIUM 150 MCG: 150 TABLET ORAL at 06:10

## 2021-03-22 RX ADMIN — PREGABALIN 25 MG: 25 CAPSULE ORAL at 09:02

## 2021-03-22 RX ADMIN — POTASSIUM CHLORIDE 20 MEQ: 750 CAPSULE, EXTENDED RELEASE ORAL at 12:18

## 2021-03-22 RX ADMIN — LIDOCAINE 2 PATCH: 50 PATCH CUTANEOUS at 09:00

## 2021-03-22 RX ADMIN — FUROSEMIDE 40 MG: 10 INJECTION, SOLUTION INTRAMUSCULAR; INTRAVENOUS at 11:09

## 2021-03-22 RX ADMIN — ASPIRIN 81 MG: 81 TABLET, CHEWABLE ORAL at 09:02

## 2021-03-22 RX ADMIN — IPRATROPIUM BROMIDE AND ALBUTEROL SULFATE 1.5 ML: 2.5; .5 SOLUTION RESPIRATORY (INHALATION) at 20:28

## 2021-03-22 RX ADMIN — INSULIN LISPRO 6 UNITS: 100 INJECTION, SOLUTION INTRAVENOUS; SUBCUTANEOUS at 21:57

## 2021-03-22 RX ADMIN — OXYCODONE HYDROCHLORIDE AND ACETAMINOPHEN 1 TABLET: 5; 325 TABLET ORAL at 06:15

## 2021-03-22 RX ADMIN — OXYCODONE HYDROCHLORIDE AND ACETAMINOPHEN 1 TABLET: 5; 325 TABLET ORAL at 02:25

## 2021-03-22 RX ADMIN — ATORVASTATIN CALCIUM 20 MG: 10 TABLET, FILM COATED ORAL at 22:00

## 2021-03-22 NOTE — OP NOTE
Date of Procedure:  3/19/2021    Preoperative Diagnosis:   1.  Coronary artery disease involving native coronary artery of native heart with unstable angina pectoris (CMS/HCC) [I25.110]  NSTEMI (non-ST elevated myocardial infarction) (CMS/HCC) [I21.4]  2.  Poorly controlled diabetes mellitus  3.  Obesity - Class III  4.  Chronic tobacco use  5.  History of a Peptic ulcer      Postoperative Diagnosis:  Same    Procedures Performed:  1. Coronary artery bypass grafting-3 vessel (left internal mammary artery/left                     anterior descending, reverse saphenous vein graft/second obtuse                               marginal, and reverse saphenous vein graft/posterior descending artery)  2. Bilateral open vein harvests  3. Prevena Incision management system    Surgeon:  Vicente Thomas MD  Assistants:  Renita Lugo and Katalina Huerta   Anesthesia Staff:  SHANI: Brady Bazan CRNA  Anesthesia Type:  General    Estimated Blood Loss:  Minimal (Cell Saver)  Drains:  1. 19 [place] Georges drain-left pleural space  2. 24 [place] Georges drains-anterior and posterior mediastinum    Specimens:  None      Operative Findings:  Excellent arterial conduit. Good venous conduit. The LAD at site of grafting measured 1.8 mm in size and had mild atherosclerotic disease burden. Post bypass grafting had excellent arterial runoff.  The OM artery measured 1.4 mm in size and had moderate atherosclerotic disease burden.  Post bypass grafting had good arterial runoff. The posterior descending artery measured 1.6 mm in size and had excellent arterial runoff with mild-moderate atherosclerotic disease burden.  Transesophageal echocardiography salient findings include preserved left ventricular function with no significant valvular dysfunction.       Total aortic cross-clamp time: 80 minutes  Total cardiac bypass time: 117 minutes     Operative description in detail:  The patient was taken to the operative suite where she  was  placed in a supine position.  Induction of general anesthesia and placement of a single-lumen endotracheal tube was performed without remark.  Appropriate arterial and venous access was established without remark.  Through the previously placed right internal jugular central venous line, a pulmonary artery catheter was floated into position.  The patient was then prepped and draped in the usual and sterile surgical fashion.  A timeout was performed.  Perioperative antibiotics were administered. Beta blocker was given.    A two team approach was utilized to harvest both the left internal mammary artery and the greater saphenous vein.  Using ultrasound each leg was surveyed and essentially she had larger than ideal greater saphenous vein at the level of her thigh bilaterally with multiple branches.  Her vein below the knee and less branches is much more acceptable.  Decision making was made to proceed with bilateral open vein harvest given that her vein was fairly superficial in juxtaposition to her skin.  Briefly the right greater saphenous vein was taken at the level of the ankle open and taken in a prograde fashion for an anticipated length of vein.  All side branches were ligated in continuity and divided.  The vein was extracted from a hemostatic bed.  The leg was closed in a layered fashion with Vicryl suture.  The vein was prepared without remark.  In a mirrored fashion the left greater saphenous vein was also taken.    While the vein was being harvested, median sternotomy was performed by me. Pericardial fat in midline from the level of the innominate vein to the level of the diaphragm was divided in midline.  A Rultract device was used to expose the posterior sternal table.  The left internal mammary artery was taken down using a standard pedicle technique with a combination of electrocautery and/or clips to control all side branches.  She had multiple branches and more proximally these were controlled with  suture ligature with 6-0 and 7-0 Prolene sutures.  At that time, the patient was systemically anticoagulated with IV heparin.  A 19 [place] Georges drain was placed in the left pleural space.  After suitable time of circulating heparin, clips were placed doubly onto the mammary artery distally and it was divided proximal to the previously placed clips.  It had excellent arterial inflow.  The mammary artery was controlled distally.  The mammary artery harvest bed was hemostatic.  The Rultract device was removed from the sterile field and a Genesse retractor was used for exposure.  The mammary artery was prepared for bypass grafting and deemed excellent.  A pericardial well was created. I elected to cannulate the heart centrally accessing distally the ascending aorta and the right atrial appendage.  Each cannula was placed in continuity with the appropriate pump line. Retrograde autologous prime was completed as indicated.  A combined cardioplegia/aortic root vent set was secured with a horizontal mattress 4-0 Prolene suture.  With an appropriate ACT and all in readiness, cardiopulmonary bypass was commenced.  I dissected out the dominant obtuse marginal, PDA, and the LAD for suitable sites for bypass grafting.  With that, I proceeded to apply the aortic cross-clamp and administered cardioplegia utilizing a warm induction strategy.  Upon achieving electrical-mechanical arrest, cold blood potassium cardioplegia was administered to a total standard dose.  We did implement systemic hypothermia mild and applied topical hypothermia to the ventricle.  At appropriate intervals, doses of cardioplegia were administered throughout the conduct of bypass grafting.     I directed my attention towards the PDA.  A coronary arteriotomy was made and augmented to size with Lawrence scissors.  It is as per operative findings.  The anastomosis was constructed in an end-to-side orientation with running 7-0 Prolene suture.  With that its  proximal anastomosis was  constructed following aortotomy with 11 blade and augmented size with 4 mm arterial punch subsequently.  This was constructed in a side aorta end vein graft fashion with running 6-0 Prolene suture. An AC  was placed.  The graft was assessed for lay which was excellent. It is without tension or torsion.     To that end I directed my attention towards the obtuse marginal.  A coronary arteriotomy was made and augmented to size with Lawrence scissors.  It is as per operative findings.  The anastomosis was constructed in an end-to-side orientation with running 7-0 Prolene suture.  With that its proximal anastomosis was constructed following aortotomy with 11 blade and augmented size with 4 mm arterial punch subsequently.  This was constructed in a side aorta end vein graft fashion with running 6-0 Prolene suture. An AC  was placed.  The graft was assessed for lay which was excellent.  It is without tension or torsion.     During a dose of cardioplegia, a pericardial slit left lateral was made while dividing associate pericardiophrenic fat and vasculature while being mindful of the lay of the phrenic nerve.  As such I proceeded to obtain control proximally onto the mammary artery and spatulated it distally.  I grafted this onto the LAD following coronary arteriotomy using previous described techniques.  The anastomosis was constructed in an end-to-side orientation with running 7-0 Prolene suture.  It is as per operative findings and the anastomosis was hemostatic.  I did tack the mammary artery pedicle to the anterior aspect heart with 6-0 Prolene sutures.    With that being accomplished, a terminal hotshot was administered.  The patient was placed in Trendelenburg position.  Upon completion of terminal hotshot and placement of temporary epicardial pacing wires, with the aortic vent on high and pump flows diminished, the aortic cross-clamp was released.  With that, full support was  implemented.  A perfusable rhythm was obtained after cardioversion.  A nonworking beating phase was implemented.  Ventilation restored.  I surveyed each graft and each anastomosis was hemostatic and had excellent lay.  With all in readiness, the heart was allowed to fill.  De-airing maneuvers were performed as guided by transesophageal echocardiography.  With that the heart was decompressed and we removed the aortic root vent/cardioplegia cannula set.  Its associated pursestring suture was tied securely and this was reinforced as per matter of routine. The table was now placed in neutral position.  With all in readiness, we proceeded to wean from and separate from cardiopulmonary bypass.  I did decannulate the venous line and snared down its associated pursestring suture.  Systemic intra-aortic protamine was administered.  All associated blood volume was returned to the patient. With continued good hemodynamics, I decannulate the arterial line and tied down it associated pursestring sutures.  At this time I tied down the previously snared pursestring suture.  The mediastinum was drained with 24 [place] of [place] Georges drains placed anteriorly and posteriorly.  I surveyed the chest and hemostasis was pristine.  With that I impregnated the sternal edges with vancomycin, thrombin, and Gelfoam paste.  The sternum was reapproximated with stainless sterile wires placed in an interrupted fashion.  In layers anatomically the soft tissue planes were reapproximated. The Superb incision management system was applied to the sternotomy incision as well as each saphenectomy incision site.  Instruments, sharps, and sponge counts were reported as correct.      Complications: None     Disposition: Transferred to ICU in stable and guarded condition.

## 2021-03-22 NOTE — PROGRESS NOTES
"CABG-3V (LIMA/LAD, RSVG/OM, and RSVG/PDA) B open vein harvest, prevena incision management system to sternum, B LE harvest incisions    POD 3    Sitting up in the chair. Dropped O2 sat to 86% on room air performing warm ups with PT. Reapplied supplemental O2. Reports pain is better, rating it 2-3/10 now. Improved muscle spasms. Nauseated, (+) belching, (-) BM. Weight up 1 pound.  this am.     Telemetry: sinus 65-81 bpm     Visit Vitals  /52 (BP Location: Left arm, Patient Position: Sitting)   Pulse 54   Temp 98.3 °F (36.8 °C) (Oral)   Resp 16   Ht 157.5 cm (62\")   Wt 116 kg (256 lb 9.6 oz)   LMP 03/17/2020 (Approximate)   SpO2 96%   Breastfeeding No   BMI 46.93 kg/m²   1.5 L NC  Baseline weight: 234 pounds      Intake/Output Summary (Last 24 hours) at 3/22/2021 0924  Last data filed at 3/22/2021 0905  Gross per 24 hour   Intake 1080 ml   Output 1485 ml   Net -405 ml     Labs:         CXR:  Bibasilar atelectasis, no pneumothorax, small bilateral pleural effusions.      Physical Exam:  General: No acute distress, in good spirits, up in chair  Cardiovascular: RRR, no murmur, rubs, or gallops.    Pulmonary: Bilateral wheezes, expiratory.   Chest: Prevena dressing to sternotomy site intact.   Abdomen: Soft, non distended, and non tender. Obese.   Extremities: Warm, moves all extremities. Bilateral lower extremities with prevenas intact (vein harvesting sites).   Neurologic: Grossly intact with no focal deficits.      Impression:  CAD  NSTEMI  Obesity, class III  Poorly controlled diabetes mellitus  Hyponatremia, asymptomatic  Leukocytosis  Chronic tobacco use   History of peptic ulcer      Medical decision-making/recommendations/plan:  Usual cardiac surgery post operative care  Increase diuresis  Increase levemir and add SSI at bedtime, diabetic education  Continue multimodality pain control strategies   Continue bowel regimen  Encourage ambulation and pulmonary toilet    DW patient, nursing, and physical " therapy

## 2021-03-22 NOTE — CONSULTS
Pt awake and alert.  Up in chair. Admitted with A1C of 12.8%. CABG on 3/19 per Dr. Thomas.  Pt states this has scared her and wants to get her Diabetes under control.  Worried about payment of bill.  Gets her meds through [facility] in [place].  Financial Counselor to see before discharge.  Pt alone in room at this time.  To see Dietitian this admit also.  Cautioned pt about staying away from people due to COVID-19 and Flu possibilities.  She has not had either vaccinations so far.  Encouraged to have eye exam when able to do so.   Time left for questions.

## 2021-03-22 NOTE — THERAPY TREATMENT NOTE
Acute Care - Physical Therapy Treatment Note  [facility]     Patient Name: Krys Cox  : 1970  MRN: [id number]  Today's Date: 3/22/2021           PT Assessment (last 12 hours)      PT Evaluation and Treatment     Row Name 21 1330 21 0857       Physical Therapy Time and Intention    Subjective Information  complains of;pain;fatigue  -  complains of;pain  -MF    Document Type  therapy note (daily note)  -  therapy note (daily note)  -    Mode of Treatment  physical therapy  -  physical therapy  -    Row Name 21 13321 0857       General Information    Existing Precautions/Restrictions  fall;sternal prevena x 3  -MF  fall;sternal prevena x 3  -MF    Row Name 21 13321 0857       Pain Scale: Word Pre/Post-Treatment    Pain: Word Scale, Pretreatment  4 - moderate pain  -  2 - mild pain  -MF    Posttreatment Pain Rating  4 - moderate pain  -MF  8 - severe pain  -MF    Pain Location - Orientation  lower  -  lower  -MF    Pain Location  back  -MF  back  -    Pain Intervention(s)  Repositioned;Medication (See MAR)  -  Repositioned;Ambulation/increased activity;Medication (See MAR)  -    Row Name 21 13321 0857       Bed Mobility    Comment (Bed Mobility)  up in chair  -  up in chair  -    Row Name 21 13321 0857       Transfers    Sit-Stand [place] (Transfers)  standby assist  -  contact guard;standby assist  -    Stand-Sit [place] (Transfers)  standby assist  -  contact guard  -    [place] Level (Toilet Transfer)  --  contact guard  -    Assistive Device (Toilet Transfer)  --  commode  -    Row Name 21 0857          Toilet Transfer    Type (Toilet Transfer)  sit-stand;stand-sit  -     Row Name 21 0857       Gait/Stairs (Locomotion)    [place] Level (Gait)  contact guard  -  contact guard;verbal cues  -    Distance in Feet (Gait)  300 1 standing rest  -  300  2 standing rests  -MF    Deviations/Abnormal Patterns (Gait)  stride length decreased;tavo decreased  -MF  stride length decreased;tavo decreased;antalgic  -MF    Bilateral Gait Deviations  heel strike decreased  -MF  --    Comment (Gait/Stairs)  --  chronic low back pain  -MF    Row Name 03/22/21 1330 03/22/21 0857       Aerobic Exercise    Comment, Aerobic Exercise (Therapeutic Exercise)  cardiac protocol x 20 reps  -MF  cardiac protocol x 20 reps  -MF    Row Name             Wound 03/19/21 0959 Right lower leg    Wound - Properties Group Placement Date: 03/19/21  -KR Placement Time: 0959  -KR Present on Hospital Admission: N  -KR Side: Right  -KR Orientation: lower  -KR Location: leg  -KR    Retired Wound - Properties Group Date first assessed: 03/19/21  -KR Time first assessed: 0959  -KR Present on Hospital Admission: N  -KR Side: Right  -KR Location: leg  -KR    Row Name             Wound 03/19/21 0959 Left lower leg    Wound - Properties Group Placement Date: 03/19/21  -KR Placement Time: 0959  -KR Present on Hospital Admission: N  -KR Side: Left  -KR Orientation: lower  -KR Location: leg  -KR    Retired Wound - Properties Group Date first assessed: 03/19/21  -KR Time first assessed: 0959  -KR Present on Hospital Admission: N  -KR Side: Left  -KR Location: leg  -KR    Row Name             Wound 03/19/21 0959 anterior sternal    Wound - Properties Group Placement Date: 03/19/21  -KR Placement Time: 0959  -KR Present on Hospital Admission: N  -KR Orientation: anterior  -KR Location: sternal  -KR    Retired Wound - Properties Group Date first assessed: 03/19/21  -KR Time first assessed: 0959  -KR Present on Hospital Admission: N  -KR Location: sternal  -KR    Row Name 03/22/21 0857          Plan of Care Review    Plan of Care Reviewed With  patient  -MF     Progress  improving  -     Outcome Summary  Pt. agreeable to therapy. She was CGA for transfers and gait. She was able to walk 300' with 2 standing  rests. Pt. is limited by her chronic back pain. Pt's O2 sat dropped to 86% while on room air prior to walk. Ambulated on 2L and O2 sat was 97%. Will continue to work on progressive mobility and independence.  -     Row Name 03/22/21 1330 03/22/21 0857       Vital Signs    Pre SpO2 (%)  83  -MF  92 dropped to 86% while on RA at rest.   -MF    O2 Delivery Pre Treatment  room air placed O2 on and O2 increased to 94%  -MF  supplemental O2  -MF    Intra SpO2 (%)  --  97  -MF    O2 Delivery Intra Treatment  supplemental O2  -MF  supplemental O2 2l  -MF    Post SpO2 (%)  94  -MF  --    O2 Delivery Post Treatment  supplemental O2  -MF  supplemental O2  -MF    Pre Patient Position  Sitting  -MF  Sitting  -MF    Intra Patient Position  Standing  -MF  Standing  -MF    Post Patient Position  Sitting  -MF  Sitting  -MF    Row Name 03/22/21 1330 03/22/21 0857       Positioning and Restraints    Pre-Treatment Position  sitting in chair/recliner  -MF  sitting in chair/recliner  -MF    Post Treatment Position  chair  -MF  chair  -MF    In Chair  reclined;call light within reach;encouraged to call for assist  -MF  notified nsg;reclined;call light within reach;encouraged to call for assist  -MF      User Key  (r) = Recorded By, (t) = Taken By, (c) = Cosigned By    Initials Name Provider Type    Dia Cook, [facility] Physical Therapy Assistant    Tito Klein --        Physical Therapy Education                 Title: PT OT SLP Therapies (Done)     Topic: Physical Therapy (Done)     Point: Mobility training (Done)     Learning Progress Summary           Patient Acceptance, E,TB,D, ISAC,RANDY,NR by VALARIE at 3/20/2021 1216    Comment: Education re: purpose of PT/importance of activity; education for safety/falls prevention; education for sternal precautions, proper tech w/ tfers, deep breathing and improved gait mechanics                   Point: Home exercise program (Done)     Learning Progress Summary           Patient Acceptance,  E,TB,D, VU,DU,NR by VALARIE at 3/20/2021 1216    Comment: Education re: purpose of PT/importance of activity; education for safety/falls prevention; education for sternal precautions, proper tech w/ tfers, deep breathing and improved gait mechanics                   Point: Precautions (Done)     Learning Progress Summary           Patient Acceptance, E,TB,D, VU,DU,NR by  at 3/20/2021 1216    Comment: Education re: purpose of PT/importance of activity; education for safety/falls prevention; education for sternal precautions, proper tech w/ tfers, deep breathing and improved gait mechanics                               User Key     Initials Effective Dates Name Provider Type Discipline     08/02/18 -  Judith Kimball, PT Physical Therapist PT              PT Recommendation and Plan     Plan of Care Reviewed With: patient  Progress: improving  Outcome Summary: Pt. agreeable to therapy. She was CGA for transfers and gait. She was able to walk 300' with 2 standing rests. Pt. is limited by her chronic back pain. Pt's O2 sat dropped to 86% while on room air prior to walk. Ambulated on 2L and O2 sat was 97%. Will continue to work on progressive mobility and independence.       Time Calculation:   PT Charges     Row Name 03/22/21 1411 03/22/21 0940          Time Calculation    Start Time  1330  -  0857  -     Stop Time  1355  -  0935  -     Time Calculation (min)  25 min  -  38 min  -     PT Received On  --  03/22/21  -     PT Goal Re-Cert Due Date  --  03/30/21  -        Time Calculation- PT    Total Timed Code Minutes- PT  25 minute(s)  -  38 minute(s)  -        Timed Charges    54803 - Gait Training Minutes   25  -MF  30  -     20225 - PT Therapeutic Activity Minutes  --  8  -       User Key  (r) = Recorded By, (t) = Taken By, (c) = Cosigned By    Initials Name Provider Type    Dia Cook PTA Physical Therapy Assistant        Therapy Charges for Today     Code Description Service Date  Service Provider Modifiers Qty    48575530583 HC GAIT TRAINING EA 15 MIN 3/22/2021 Dia Vargas, PTA GP 2    41509337696 HC PT THERAPEUTIC ACT EA 15 MIN 3/22/2021 Dia Vargas, JAIME GP 1    34645233729 HC GAIT TRAINING EA 15 MIN 3/22/2021 Dia Vargas, PTA GP 2          PT G-Codes  Outcome Measure Options: AM-PAC 6 Clicks Basic Mobility (PT)  AM-PAC 6 Clicks Score (PT): 12    Dia Vargas PTA  3/22/2021

## 2021-03-22 NOTE — PLAN OF CARE
Goal Outcome Evaluation:  Plan of Care Reviewed With: patient     Outcome Summary: VSS. C/O INCISIONAL PAIN THIS SHIFT. MEDICATED PER MAR WITH RELIEF. SEE MD ORDERS FOR MED CHANGES. BM TODAY. GOOD URINE OUTPUT WITH IV LASIX. BETWEEN [phone] ON IS, ENCOURAGED USE. UP IN CHAIR ALL OF SHIFT. AMBULATING BETTER. STILL ON 1L NC, DESATS WHILE SLEEPING. CONTINUE TO MONITOR, NOTIFY MD OF ANY CHANGES.

## 2021-03-22 NOTE — PLAN OF CARE
Goal Outcome Evaluation:  Plan of Care Reviewed With: patient  Progress: no change  Outcome Summary: VSS, pulling 500-750 on I.S., 2L/NC applied for 02 sats of 87%, pain level has improved with no c/o spasms this shift, prn pain medication given x2 for incisional pain, prevena to all incisions, V wires taped and isolated, no BM since 3-16 with hypoactive bowel sounds, bruised area with skin tear to left neck, Zofran given for nausea this a.m.,on a 1500 fluid restriction with no free water, 1 lb weight gain since yesterday, 750ml plus one occurance for urine output, blood sugar elevated MD called, sinus 68-81.

## 2021-03-22 NOTE — THERAPY TREATMENT NOTE
Acute Care - Physical Therapy Treatment Note  [facility]     Patient Name: Krys Cox  : 1970  MRN: [id number]  Today's Date: 3/22/2021           PT Assessment (last 12 hours)      PT Evaluation and Treatment     Row Name 21 0857          Physical Therapy Time and Intention    Subjective Information  complains of;pain  -     Document Type  therapy note (daily note)  -     Mode of Treatment  physical therapy  -     Row Name 21 0857          General Information    Existing Precautions/Restrictions  fall;sternal prevena x 3  -     Row Name 21 0857          Pain Scale: Word Pre/Post-Treatment    Pain: Word Scale, Pretreatment  2 - mild pain  -     Posttreatment Pain Rating  8 - severe pain  -     Pain Location - Orientation  lower  -     Pain Location  back  -     Pain Intervention(s)  Repositioned;Ambulation/increased activity;Medication (See MAR)  -     Row Name 21 0857          Bed Mobility    Comment (Bed Mobility)  up in chair  -     Row Name 21 0857          Transfers    Sit-Stand [place] (Transfers)  contact guard;standby assist  -     Stand-Sit [place] (Transfers)  contact guard  -     [place] Level (Toilet Transfer)  contact guard  -     Assistive Device (Toilet Transfer)  commode  -     Row Name 21 0857          Toilet Transfer    Type (Toilet Transfer)  sit-stand;stand-sit  -     Row Name 21 0857          Gait/Stairs (Locomotion)    [place] Level (Gait)  contact guard;verbal cues  -     Distance in Feet (Gait)  300 2 standing rests  -     Deviations/Abnormal Patterns (Gait)  stride length decreased;tavo decreased;antalgic  -     Comment (Gait/Stairs)  chronic low back pain  -     Row Name 21 0857          Aerobic Exercise    Comment, Aerobic Exercise (Therapeutic Exercise)  cardiac protocol x 20 reps  -     Row Name             Wound 21 0959 Right lower leg    Wound - Properties  Group Placement Date: 03/19/21  -KR Placement Time: 0959  -KR Present on Hospital Admission: N  -KR Side: Right  -KR Orientation: lower  -KR Location: leg  -KR    Retired Wound - Properties Group Date first assessed: 03/19/21  -KR Time first assessed: 0959  -KR Present on Hospital Admission: N  -KR Side: Right  -KR Location: leg  -KR    Row Name             Wound 03/19/21 0959 Left lower leg    Wound - Properties Group Placement Date: 03/19/21  -KR Placement Time: 0959  -KR Present on Hospital Admission: N  -KR Side: Left  -KR Orientation: lower  -KR Location: leg  -KR    Retired Wound - Properties Group Date first assessed: 03/19/21  -KR Time first assessed: 0959  -KR Present on Hospital Admission: N  -KR Side: Left  -KR Location: leg  -KR    Row Name             Wound 03/19/21 0959 anterior sternal    Wound - Properties Group Placement Date: 03/19/21  -KR Placement Time: 0959  -KR Present on Hospital Admission: N  -KR Orientation: anterior  -KR Location: sternal  -KR    Retired Wound - Properties Group Date first assessed: 03/19/21  -KR Time first assessed: 0959  -KR Present on Hospital Admission: N  -KR Location: sternal  -KR    Row Name 03/22/21 0857          Plan of Care Review    Plan of Care Reviewed With  patient  -MF     Progress  improving  -     Outcome Summary  Pt. agreeable to therapy. She was CGA for transfers and gait. She was able to walk 300' with 2 standing rests. Pt. is limited by her chronic back pain. Pt's O2 sat dropped to 86% while on room air prior to walk. Ambulated on 2L and O2 sat was 97%. Will continue to work on progressive mobility and independence.  -     Row Name 03/22/21 0857          Vital Signs    Pre SpO2 (%)  92 dropped to 86% while on RA at rest.   -MF     O2 Delivery Pre Treatment  supplemental O2  -MF     Intra SpO2 (%)  97  -MF     O2 Delivery Intra Treatment  supplemental O2 2l  -MF     O2 Delivery Post Treatment  supplemental O2  -MF     Pre Patient Position  Sitting   -MF     Intra Patient Position  Standing  -MF     Post Patient Position  Sitting  -MF     Row Name 03/22/21 0857          Positioning and Restraints    Pre-Treatment Position  sitting in chair/recliner  -MF     Post Treatment Position  chair  -MF     In Chair  notified nsg;reclined;call light within reach;encouraged to call for assist  -MF       User Key  (r) = Recorded By, (t) = Taken By, (c) = Cosigned By    Initials Name Provider Type    Dia Cook PTA Physical Therapy Assistant    Tito Klien --        Physical Therapy Education                 Title: PT OT SLP Therapies (Done)     Topic: Physical Therapy (Done)     Point: Mobility training (Done)     Learning Progress Summary           Patient Acceptance, E,TB,D, VU,DU,NR by  at 3/20/2021 1216    Comment: Education re: purpose of PT/importance of activity; education for safety/falls prevention; education for sternal precautions, proper tech w/ tfers, deep breathing and improved gait mechanics                   Point: Home exercise program (Done)     Learning Progress Summary           Patient Acceptance, E,TB,D, VU,DU,NR by  at 3/20/2021 1216    Comment: Education re: purpose of PT/importance of activity; education for safety/falls prevention; education for sternal precautions, proper tech w/ tfers, deep breathing and improved gait mechanics                   Point: Precautions (Done)     Learning Progress Summary           Patient Acceptance, E,TB,D, VU,DU,NR by  at 3/20/2021 1216    Comment: Education re: purpose of PT/importance of activity; education for safety/falls prevention; education for sternal precautions, proper tech w/ tfers, deep breathing and improved gait mechanics                               User Key     Initials Effective Dates Name Provider Type Discipline     08/02/18 -  Judith Kimball, PT Physical Therapist PT              PT Recommendation and Plan     Plan of Care Reviewed With: patient  Progress:  improving  Outcome Summary: Pt. agreeable to therapy. She was CGA for transfers and gait. She was able to walk 300' with 2 standing rests. Pt. is limited by her chronic back pain. Pt's O2 sat dropped to 86% while on room air prior to walk. Ambulated on 2L and O2 sat was 97%. Will continue to work on progressive mobility and independence.       Time Calculation:   PT Charges     Row Name 03/22/21 0940             Time Calculation    Start Time  0857  -      Stop Time  0935  -      Time Calculation (min)  38 min  -      PT Received On  03/22/21  -      PT Goal Re-Cert Due Date  03/30/21  -         Time Calculation- PT    Total Timed Code Minutes- PT  38 minute(s)  -         Timed Charges    22212 - Gait Training Minutes   30  -MF      99970 - PT Therapeutic Activity Minutes  8  -MF        User Key  (r) = Recorded By, (t) = Taken By, (c) = Cosigned By    Initials Name Provider Type     Dia Vargas PTA Physical Therapy Assistant        Therapy Charges for Today     Code Description Service Date Service Provider Modifiers Qty    53812694759 HC GAIT TRAINING EA 15 MIN 3/22/2021 Dia Vargas PTA GP 2    65972461655 HC PT THERAPEUTIC ACT EA 15 MIN 3/22/2021 Dia Vargas PTA GP 1          PT G-Codes  Outcome Measure Options: AM-PAC 6 Clicks Basic Mobility (PT)  AM-PAC 6 Clicks Score (PT): 12    Dia Vargas PTA  3/22/2021

## 2021-03-22 NOTE — PLAN OF CARE
Goal Outcome Evaluation:  Plan of Care Reviewed With: patient  Progress: improving  Outcome Summary: Pt. agreeable to therapy. She was CGA for transfers and gait. She was able to walk 300' with 2 standing rests. Pt. is limited by her chronic back pain. Pt's O2 sat dropped to 86% while on room air prior to walk. Ambulated on 2L and O2 sat was 97%. Will continue to work on progressive mobility and independence.

## 2021-03-22 NOTE — DISCHARGE SUMMARY
[facility] Cardiothoracic Surgery  DISCHARGE SUMMARY        Date of Admission: 3/17/2021  Date of Discharge:  3/24/2021  Primary Care Physician: Karolina Toney APRN    Discharge Diagnoses:  Active Hospital Problems    Diagnosis     **NSTEMI (non-ST elevated myocardial infarction) (CMS/[facility])     Coronary artery disease involving native coronary artery of native heart with unstable angina pectoris (CMS/[facility])     Hypertension     Poorly controlled diabetes mellitus (CMS/[facility])      Hemoglobin A1c 12.8%      Class 3 severe obesity due to excess calories with serious comorbidity and body mass index (BMI) of 40.0 to 44.9 in adult (CMS/[facility])     Hypothyroid     Tobacco use      1 ppd x 30 years       Osteoarthritis     Anxiety     Peptic ulcer      History of peptic ulcer      Lung nodules      Procedures Performed: Coronary artery bypass grafting-3 vessel (left internal mammary artery/left anterior descending, reverse saphenous vein graft/second obtuse marginal, and reverse saphenous vein graft/posterior descending artery), bilateral open vein harvests, Prevena Incision management system performed on 3/19/2021 by Dr. Thomas.     HPI:  Ms. Krys Cox is a 51 y.o. female who was transferred from [facility] on 3/17/2021 for evaluation by cardiothoracic surgery for possible coronary artery bypass grafting. One week prior, on 3/11/2021, she developed indigestion type symptoms. She believed this was just reflux. She did not seek evaluation until the following day, 3/12/2021, when the indigestion progressed to diaphoresis, nausea, and chest pain. She presented to [facility] emergency department and was ultimately admitted for NSTEMI workup. Initial troponin was 4.11 and blood glucose was 289. BNP was elevated. Cardiology was consulted with heart cath planned but placed on hold due to a positive covid test on 3/12. She indicates she remained chest pain free that time. She  was given oral vitamin C, D, and zinc. 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 She required an insulin gtt for hyperglycemia during that time. She was retested for covid on 3/16 and tested negative. Cardiac catheterization was completed on 3/17/2021 by Dr. Gavin. Findings identified multivessel coronary artery disease with depressed LV function (formal cath report not available for review). Dr. Gavin contacted Dr. Burgos with decision-making made to transfer her to [facility] for higher level of care. A 3rd covid test was performed prior to transfer and was reported to be negative as well. She denies chest pain and shortness of breath now. She follows with Karolina DIMAS from [facility] in [place], [place] for primary care. Her hemoglobin A1c is 12.8% upon admission. She takes Levemir 100 units nightly with Humalog sliding scale three times per day. Her blood sugar runs around 400 at home. She smokes 1 ppd of cigarettes for approximately 30 years. She has tried to quit smoking but has been unsuccessful.    Hospital Course: Preoperative workup was completed with the findings of a few lung nodules, largest measuring 4 mm and less than 50% stenosis of bilateral internal carotid arteries. BG ranged 80-100s after arrival. Decision-making was made to proceed with urgent CABG by Dr. Thomas on 3/19/2021. Please see a separate op note by Dr. Thomas detailing the operation. Following surgery, she was transferred to the ICU in stable guarded condition. She remained hemodynamically stable. On post op day 1, she was extubated and demonstrated a neurologically intact exam. She required multimodality pain control strategies. She did walk around the unit with physical therapy. She met criteria for transfer to  on post op day 1. The remaining stay of her hospitalization was remarkable for diuresis, encouraging pulmonary toilet and ambulation. She  received diabetic and dietary education. Mediastinal tubes and amilcar drain were removed on post op day 2. She was weaned off supplemental oxygen and is saturating appropriately on room air. She is walking 300 feet. Postoperatively, she was found to have a thermal injury to the left neck. Silvadene cream was applied and she reported no additional issues. She meets criteria for discharge home on post op day 5 and is agreeable to do so with her spouse.     Condition on Discharge:  Neurologically intact and has good pain control.  She is eating well and has demonstrable good bowel function.  The sternum is stable without clicks. The sternotomy and saphenectomy incisions are with Prevena incision management system in place. The heart is in normal sinus rhythm.  She has met all physical therapy criteria and verbalizes understanding of sternotomy precautions.   She verbalizes understanding of a separately handed out cardiac surgery handout.       Discharge Disposition:  Home     Discharge Medications:     Discharge Medications        New Medications        Instructions Start Date   atorvastatin 40 MG tablet  Commonly known as: LIPITOR  Notes to patient: Next Dose: 3/24/2021 8pm   40 mg, Oral, Nightly      clopidogrel 75 MG tablet  Commonly known as: PLAVIX  Notes to patient: Next Dose: 3/24/2021 5pm   75 mg, Oral, Daily      lisinopril 5 MG tablet  Commonly known as: PRINIVIL,ZESTRIL  Notes to patient: Next Dose: 3/25/2021 8am   5 mg, Oral, Daily      oxyCODONE-acetaminophen 5-325 MG per tablet  Commonly known as: PERCOCET   1 tablet, Oral, Every 6 Hours PRN      pantoprazole 40 MG EC tablet  Commonly known as: PROTONIX  Notes to patient: Next Dose: 3/25/2021 8am   40 mg, Oral, Daily      silver sulfadiazine 1 % cream  Commonly known as: SILVADENE, SSD  Notes to patient: Next Dose: 3/24/2021 8pm   Topical, 2 Times Daily, Apply to left neck affected area             Changes to Medications        Instructions Start Date      insulin detemir 100 UNIT/ML injection  Commonly known as: LEVEMIR  What changed: how much to take   40 Units, Subcutaneous, Nightly      metoprolol tartrate 25 MG tablet  Commonly known as: LOPRESSOR  What changed:   medication strength  how much to take  when to take this   25 mg, Oral, 2 Times Daily             Continue These Medications        Instructions Start Date   aspirin 81 MG chewable tablet   81 mg, Oral, Daily      insulin lispro 100 UNIT/ML injection  Commonly known as: humaLOG   1-6 Units, Subcutaneous, 3 Times Daily, 1-6 units three times a day as needed      levothyroxine 150 MCG tablet  Commonly known as: SYNTHROID, LEVOTHROID   150 mcg, Oral, Daily      sertraline 100 MG tablet  Commonly known as: ZOLOFT   100 mg, Oral, Daily      spironolactone 50 MG tablet  Commonly known as: ALDACTONE   50 mg, Oral, Daily      vitamin D3 125 MCG (5000 UT) capsule capsule   10,000 Units, Oral, Daily             Stop These Medications      carvedilol 6.25 MG tablet  Commonly known as: COREG     enalapril 20 MG tablet  Commonly known as: VASOTEC     hydrALAZINE 50 MG tablet  Commonly known as: APRESOLINE     pravastatin 40 MG tablet  Commonly known as: PRAVACHOL       Silvadene SSD 1% cream. Apply topically every 12 hours to left neck affected area.      Discharge Diet: No concentrated sweets diet with an effort to maintain acceptable glycemic control.      Discharge Care Plan / Instructions: Please see the separately handed out cardiac surgery handout. Cardiac rehab deferred at this time due to sternotomy precautions-will reassess at formal office visit.     Activity at Discharge:   No heavy lifting greater than 5 pounds or a gallon of milk while maintaining sternal precautions.  Krys Cox has been instructed on an exercise  regiment as detailed in a handed out cardiac surgery handout.    Tobacco:  Patient has been counseled as to smoking cessation. We discussed its benefits in regards to immediate  recovery and the long-term benefits of avoidance of tobacco products. Informed of the smoking cessation program available here. We discussed the benefit of long-term health that tobacco cessation would convey.      BMI: Patient's Body mass index is 45.73 kg/m². BMI is above normal parameters. Recommendations include: educational material and referral to primary care.    Follow-up Appointments: Krys Cox  is requested to see Karolina Toney APRN within 1-2 weeks from time of discharge, to see Karina DIMAS in 2 days for Prevena removal/wound care with labs, to follow-up with Dr. Thomas in 4 weeks,  and to follow-up with Dr. Gavin of the cardiology service in 2 weeks.       Agree with the aforementioned document rendered by Karina Platt.

## 2021-03-23 LAB
ALBUMIN SERPL-MCNC: 2.6 G/DL (ref 3.5–5.2)
ALBUMIN/GLOB SERPL: 0.7 G/DL
ALP SERPL-CCNC: 176 U/L (ref 39–117)
ALT SERPL W P-5'-P-CCNC: 18 U/L (ref 1–33)
ANION GAP SERPL CALCULATED.3IONS-SCNC: 10 MMOL/L (ref 5–15)
AST SERPL-CCNC: 21 U/L (ref 1–32)
BILIRUB SERPL-MCNC: 0.2 MG/DL (ref 0–1.2)
BUN SERPL-MCNC: 25 MG/DL (ref 6–20)
BUN/CREAT SERPL: 37.3 (ref 7–25)
CALCIUM SPEC-SCNC: 8.4 MG/DL (ref 8.6–10.5)
CHLORIDE SERPL-SCNC: 100 MMOL/L (ref 98–107)
CO2 SERPL-SCNC: 23 MMOL/L (ref 22–29)
CREAT SERPL-MCNC: 0.67 MG/DL (ref 0.57–1)
DEPRECATED RDW RBC AUTO: 38.7 FL (ref 37–54)
ERYTHROCYTE [DISTWIDTH] IN BLOOD BY AUTOMATED COUNT: 12.9 % (ref 12.3–15.4)
GFR SERPL CREATININE-BSD FRML MDRD: 93 ML/MIN/1.73
GLOBULIN UR ELPH-MCNC: 3.7 GM/DL
GLUCOSE BLDC GLUCOMTR-MCNC: 173 MG/DL (ref 70–130)
GLUCOSE BLDC GLUCOMTR-MCNC: 197 MG/DL (ref 70–130)
GLUCOSE BLDC GLUCOMTR-MCNC: 217 MG/DL (ref 70–130)
GLUCOSE BLDC GLUCOMTR-MCNC: 387 MG/DL (ref 70–130)
GLUCOSE SERPL-MCNC: 192 MG/DL (ref 65–99)
HCT VFR BLD AUTO: 27.3 % (ref 34–46.6)
HGB BLD-MCNC: 9.1 G/DL (ref 12–15.9)
MCH RBC QN AUTO: 27.3 PG (ref 26.6–33)
MCHC RBC AUTO-ENTMCNC: 33.3 G/DL (ref 31.5–35.7)
MCV RBC AUTO: 82 FL (ref 79–97)
PLATELET # BLD AUTO: 402 10*3/MM3 (ref 140–450)
PMV BLD AUTO: 10.4 FL (ref 6–12)
POTASSIUM SERPL-SCNC: 4.7 MMOL/L (ref 3.5–5.2)
PROT SERPL-MCNC: 6.3 G/DL (ref 6–8.5)
RBC # BLD AUTO: 3.33 10*6/MM3 (ref 3.77–5.28)
SODIUM SERPL-SCNC: 133 MMOL/L (ref 136–145)
WBC # BLD AUTO: 10.31 10*3/MM3 (ref 3.4–10.8)

## 2021-03-23 PROCEDURE — 80053 COMPREHEN METABOLIC PANEL: CPT | Performed by: NURSE PRACTITIONER

## 2021-03-23 PROCEDURE — 97116 GAIT TRAINING THERAPY: CPT

## 2021-03-23 PROCEDURE — 85027 COMPLETE CBC AUTOMATED: CPT | Performed by: NURSE PRACTITIONER

## 2021-03-23 PROCEDURE — 99024 POSTOP FOLLOW-UP VISIT: CPT | Performed by: NURSE PRACTITIONER

## 2021-03-23 PROCEDURE — 25010000002 ONDANSETRON PER 1 MG: Performed by: THORACIC SURGERY (CARDIOTHORACIC VASCULAR SURGERY)

## 2021-03-23 PROCEDURE — 25010000002 ENOXAPARIN PER 10 MG: Performed by: THORACIC SURGERY (CARDIOTHORACIC VASCULAR SURGERY)

## 2021-03-23 PROCEDURE — 94799 UNLISTED PULMONARY SVC/PX: CPT

## 2021-03-23 PROCEDURE — 99253 IP/OBS CNSLTJ NEW/EST LOW 45: CPT | Performed by: NURSE PRACTITIONER

## 2021-03-23 PROCEDURE — 63710000001 INSULIN LISPRO (HUMAN) PER 5 UNITS: Performed by: NURSE PRACTITIONER

## 2021-03-23 PROCEDURE — 25010000002 FUROSEMIDE PER 20 MG: Performed by: THORACIC SURGERY (CARDIOTHORACIC VASCULAR SURGERY)

## 2021-03-23 PROCEDURE — 82962 GLUCOSE BLOOD TEST: CPT

## 2021-03-23 PROCEDURE — 63710000001 INSULIN DETEMIR PER 5 UNITS: Performed by: NURSE PRACTITIONER

## 2021-03-23 RX ADMIN — BISACODYL 10 MG: 5 TABLET ORAL at 08:37

## 2021-03-23 RX ADMIN — ACETAMINOPHEN 650 MG: 325 TABLET ORAL at 23:59

## 2021-03-23 RX ADMIN — BISACODYL 10 MG: 5 TABLET ORAL at 21:02

## 2021-03-23 RX ADMIN — PREGABALIN 25 MG: 25 CAPSULE ORAL at 08:38

## 2021-03-23 RX ADMIN — POTASSIUM CHLORIDE 20 MEQ: 750 CAPSULE, EXTENDED RELEASE ORAL at 12:01

## 2021-03-23 RX ADMIN — ENOXAPARIN SODIUM 30 MG: 30 INJECTION SUBCUTANEOUS at 08:38

## 2021-03-23 RX ADMIN — METOPROLOL TARTRATE 25 MG: 25 TABLET, FILM COATED ORAL at 08:39

## 2021-03-23 RX ADMIN — IPRATROPIUM BROMIDE AND ALBUTEROL SULFATE 1.5 ML: 2.5; .5 SOLUTION RESPIRATORY (INHALATION) at 12:54

## 2021-03-23 RX ADMIN — METHOCARBAMOL 500 MG: 500 TABLET, FILM COATED ORAL at 06:05

## 2021-03-23 RX ADMIN — METHOCARBAMOL 500 MG: 500 TABLET, FILM COATED ORAL at 14:11

## 2021-03-23 RX ADMIN — FUROSEMIDE 40 MG: 10 INJECTION, SOLUTION INTRAMUSCULAR; INTRAVENOUS at 08:37

## 2021-03-23 RX ADMIN — METHOCARBAMOL 500 MG: 500 TABLET, FILM COATED ORAL at 21:02

## 2021-03-23 RX ADMIN — SILVER SULFADIAZINE: 10 CREAM TOPICAL at 12:00

## 2021-03-23 RX ADMIN — INSULIN LISPRO 12 UNITS: 100 INJECTION, SOLUTION INTRAVENOUS; SUBCUTANEOUS at 12:32

## 2021-03-23 RX ADMIN — OXYCODONE HYDROCHLORIDE AND ACETAMINOPHEN 1 TABLET: 5; 325 TABLET ORAL at 21:03

## 2021-03-23 RX ADMIN — POTASSIUM CHLORIDE 20 MEQ: 750 CAPSULE, EXTENDED RELEASE ORAL at 17:20

## 2021-03-23 RX ADMIN — CLOPIDOGREL 75 MG: 75 TABLET, FILM COATED ORAL at 17:21

## 2021-03-23 RX ADMIN — INSULIN LISPRO 3 UNITS: 100 INJECTION, SOLUTION INTRAVENOUS; SUBCUTANEOUS at 17:21

## 2021-03-23 RX ADMIN — INSULIN DETEMIR 40 UNITS: 100 INJECTION, SOLUTION SUBCUTANEOUS at 21:01

## 2021-03-23 RX ADMIN — ATORVASTATIN CALCIUM 20 MG: 10 TABLET, FILM COATED ORAL at 21:02

## 2021-03-23 RX ADMIN — ASPIRIN 81 MG: 81 TABLET, CHEWABLE ORAL at 08:38

## 2021-03-23 RX ADMIN — LIDOCAINE 2 PATCH: 50 PATCH CUTANEOUS at 08:38

## 2021-03-23 RX ADMIN — LEVOTHYROXINE SODIUM 150 MCG: 150 TABLET ORAL at 06:05

## 2021-03-23 RX ADMIN — IPRATROPIUM BROMIDE AND ALBUTEROL SULFATE 1.5 ML: 2.5; .5 SOLUTION RESPIRATORY (INHALATION) at 06:42

## 2021-03-23 RX ADMIN — FUROSEMIDE 40 MG: 10 INJECTION, SOLUTION INTRAMUSCULAR; INTRAVENOUS at 12:02

## 2021-03-23 RX ADMIN — METOPROLOL TARTRATE 25 MG: 25 TABLET, FILM COATED ORAL at 21:02

## 2021-03-23 RX ADMIN — FUROSEMIDE 40 MG: 10 INJECTION, SOLUTION INTRAMUSCULAR; INTRAVENOUS at 17:20

## 2021-03-23 RX ADMIN — IPRATROPIUM BROMIDE AND ALBUTEROL SULFATE 1.5 ML: 2.5; .5 SOLUTION RESPIRATORY (INHALATION) at 20:38

## 2021-03-23 RX ADMIN — INSULIN LISPRO 5 UNITS: 100 INJECTION, SOLUTION INTRAVENOUS; SUBCUTANEOUS at 21:03

## 2021-03-23 RX ADMIN — POTASSIUM CHLORIDE 20 MEQ: 750 CAPSULE, EXTENDED RELEASE ORAL at 08:38

## 2021-03-23 RX ADMIN — ACETAMINOPHEN 650 MG: 325 TABLET ORAL at 06:05

## 2021-03-23 RX ADMIN — ACETAMINOPHEN 650 MG: 325 TABLET ORAL at 17:20

## 2021-03-23 RX ADMIN — ACETAMINOPHEN 650 MG: 325 TABLET ORAL at 12:02

## 2021-03-23 RX ADMIN — ENOXAPARIN SODIUM 30 MG: 30 INJECTION SUBCUTANEOUS at 21:02

## 2021-03-23 RX ADMIN — ONDANSETRON HYDROCHLORIDE 4 MG: 2 SOLUTION INTRAMUSCULAR; INTRAVENOUS at 23:59

## 2021-03-23 RX ADMIN — OXYCODONE HYDROCHLORIDE AND ACETAMINOPHEN 1 TABLET: 5; 325 TABLET ORAL at 08:37

## 2021-03-23 RX ADMIN — INSULIN LISPRO 2 UNITS: 100 INJECTION, SOLUTION INTRAVENOUS; SUBCUTANEOUS at 08:37

## 2021-03-23 RX ADMIN — SILVER SULFADIAZINE: 10 CREAM TOPICAL at 21:03

## 2021-03-23 RX ADMIN — PREGABALIN 25 MG: 25 CAPSULE ORAL at 21:02

## 2021-03-23 NOTE — PLAN OF CARE
Goal Outcome Evaluation:      Nutrition: follow up completed. Pt is currently on Cardiac/Con CHO diet, average PO intake 70% of 5 meals. RDN will attempt DM education today- A1C 12.80. RDN will continue to follow pt and encourage PO intake.

## 2021-03-23 NOTE — PLAN OF CARE
Goal Outcome Evaluation:  Plan of Care Reviewed With: patient  Progress: improving  Outcome Summary: Pt up in chair sit-stand sba. Pt amb 300' sba/cga rest break x2 due to increased back pain which limits her mobility. reviewed cardiac sternal precuations.
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
## 2021-03-23 NOTE — PLAN OF CARE
Problem: Adult Inpatient Plan of Care  Goal: Plan of Care Review  3/23/2021 0450 by Leta Dunn, RN  Outcome: Ongoing, Progressing  Flowsheets (Taken 3/23/2021 0450)  Progress: improving  Plan of Care Reviewed With: patient  Outcome Summary: vss.  Wt. down 3 pounds to 253.4.  Had to put on oxygen at 1l/nc with sleep for a drop to 84%.  weaned to 1/2 liter.  and finally weaned off this morning.  Medicated per mar with prn meds once and then only with scheduled meds the rest of the night.  Patient walked to 4C and back.  1000on the IS.  Getting in and out of the bed much better and with much less assist.  Cont. to monitor.  call for concerns.   Notifly MD for changes.  S 63-77  3/23/2021 0449 by Leta Dunn, RN  Outcome: Ongoing, Progressing  Flowsheets (Taken 3/23/2021 0446)  Progress: improving   Goal Outcome Evaluation:  Plan of Care Reviewed With: patient  Progress: improving  Outcome Summary: vss.  Wt. down 3 pounds to 253.4.  Had to put on oxygen at 1l/nc with sleep for a drop to 84%.  weaned to 1/2 liter.  and finally weaned off this morning.  Medicated per mar with prn meds once and then only with scheduled meds the rest of the night.  Patient walked to 4C and back.  1000on the IS.  Getting in and out of the bed much better and with much less assist.  Cont. to monitor.  call for concerns.   Notifly MD for changes.  S 63-77
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
## 2021-03-23 NOTE — THERAPY TREATMENT NOTE
Acute Care - Physical Therapy Treatment Note  [facility]     Patient Name: Krys Cox  : 1970  MRN: [id number]  Today's Date: 3/23/2021           PT Assessment (last 12 hours)      PT Evaluation and Treatment     Row Name 21 1501 21 1125       Physical Therapy Time and Intention    Subjective Information  complains of;pain  -DAVY  complains of;pain  -NW    Document Type  therapy note (daily note)  -DAVY  therapy note (daily note)  -NW    Mode of Treatment  physical therapy  -DAVY  physical therapy  -NW    Comment  3 prevena WV's  -DAVY  3 prevena vacs  -NW    Row Name 21 1049          Physical Therapy Time and Intention    Subjective Information  --  -NW     Document Type  --  -NW     Row Name 21 1501 21 1125       General Information    Existing Precautions/Restrictions  fall;cardiac;sternal  -DAVY  fall;cardiac;sternal  -NW    Row Name 21 1501 21 1125       Pain Scale: Numbers Pre/Post-Treatment    Pretreatment Pain Rating  6/10  -DAVY  4/10  -NW    Posttreatment Pain Rating  6/10  -DAVY  4/10  -NW    Pain Location - Orientation  --  incisional along w/ chronic back pain  -NW    Pain Location  back  -DAVY  --    Pre/Posttreatment Pain Comment  chronic back pain, worse with amb  -DAVY  --    Row Name 21 1501 21 1125       Bed Mobility    Comment (Bed Mobility)  in the bathroom, then the phoebe  -DAVY  chair  -NW    Row Name 21 1501 21 1125       Transfers    Sit-Stand [place] (Transfers)  standby assist  -DAVY  verbal cues;standby assist  -NW    Stand-Sit [place] (Transfers)  standby assist  -DAVY  verbal cues;standby assist  -NW    Row Name 21 1501 21 1125       Gait/Stairs (Locomotion)    [place] Level (Gait)  standby assist;contact guard  -DAVY  verbal cues;standby assist;contact guard  -NW    Distance in Feet (Gait)  300' with 2 standing rests due to back pain  -DAVY  300 standing rest x2 due to pain in LBP  -NW     Deviations/Abnormal Patterns (Gait)  --  tavo decreased;stride length decreased  -NW    Bilateral Gait Deviations  --  heel strike decreased  -NW    Comment (Gait/Stairs)  --  chronic low back pain hinders patient from amb farther  -NW    Row Name 03/23/21 1125          Safety Issues, Functional Mobility    Impairments Affecting Function (Mobility)  endurance/activity tolerance;pain  -NW     Row Name 03/23/21 1125          Aerobic Exercise    Comment, Aerobic Exercise (Therapeutic Exercise)  cardiac protocol  -NW     Row Name             Wound 03/19/21 0959 Right lower leg    Wound - Properties Group Placement Date: 03/19/21  -KR Placement Time: 0959  -KR Present on Hospital Admission: N  -KR Side: Right  -KR Orientation: lower  -KR Location: leg  -KR    Retired Wound - Properties Group Date first assessed: 03/19/21  -KR Time first assessed: 0959  -KR Present on Hospital Admission: N  -KR Side: Right  -KR Location: leg  -KR    Row Name             Wound 03/19/21 0959 Left lower leg    Wound - Properties Group Placement Date: 03/19/21  -KR Placement Time: 0959  -KR Present on Hospital Admission: N  -KR Side: Left  -KR Orientation: lower  -KR Location: leg  -KR    Retired Wound - Properties Group Date first assessed: 03/19/21  -KR Time first assessed: 0959  -KR Present on Hospital Admission: N  -KR Side: Left  -KR Location: leg  -KR    Row Name             Wound 03/19/21 0959 anterior sternal    Wound - Properties Group Placement Date: 03/19/21  -KR Placement Time: 0959  -KR Present on Hospital Admission: N  -KR Orientation: anterior  -KR Location: sternal  -KR    Retired Wound - Properties Group Date first assessed: 03/19/21  -KR Time first assessed: 0959  -KR Present on Hospital Admission: N  -KR Location: sternal  -KR    Row Name             Wound 03/19/21 Left posterior throat Burn    Wound - Properties Group Placement Date: 03/19/21  -WC Present on Hospital Admission: N  -WC Side: Left  -WC Orientation:  posterior  -AT Location: throat  -WC Primary Wound Type: Burn  -AT, possible thermal wound of some kind  Stage, Pressure Injury : medical device related;Stage 2  -AT, possible device related     Retired Wound - Properties Group Date first assessed: 03/19/21  -WC Present on Hospital Admission: N  -WC Side: Left  -WC Location: throat  -WC Primary Wound Type: Burn  -AT, possible thermal wound of some kind     Row Name 03/23/21 1501 03/23/21 1125       Positioning and Restraints    Pre-Treatment Position  bathroom  -DAVY  sitting in chair/recliner  -NW    Post Treatment Position  chair  -DAVY  chair  -NW    In Chair  reclined;call light within reach;encouraged to call for assist;with family/caregiver  -DAVY  reclined;call light within reach;encouraged to call for assist  -NW      User Key  (r) = Recorded By, (t) = Taken By, (c) = Cosigned By    Initials Name Provider Type    Derrick Carson, PTA Physical Therapy Assistant    NW Gogo Graf, JAIME Physical Therapy Assistant    Leta Alba RN Registered Nurse    ANNIA BurnetteorVivi RN Registered Nurse    Tito Klein --        Physical Therapy Education                 Title: PT OT SLP Therapies (Done)     Topic: Physical Therapy (Done)     Point: Mobility training (Done)     Learning Progress Summary           Patient Acceptance, E,TB,D, ISAC,RANDY,NR by  at 3/20/2021 1216    Comment: Education re: purpose of PT/importance of activity; education for safety/falls prevention; education for sternal precautions, proper tech w/ tfers, deep breathing and improved gait mechanics                   Point: Home exercise program (Done)     Learning Progress Summary           Patient Acceptance, E,TB,D, ISAC,RANDY,NR by  at 3/20/2021 1216    Comment: Education re: purpose of PT/importance of activity; education for safety/falls prevention; education for sternal precautions, proper tech w/ tfers, deep breathing and improved gait mechanics                   Point: Precautions  (Done)     Learning Progress Summary           Patient Acceptance, E,TB,D, VU,DU,NR by  at 3/20/2021 1216    Comment: Education re: purpose of PT/importance of activity; education for safety/falls prevention; education for sternal precautions, proper tech w/ tfers, deep breathing and improved gait mechanics                               User Key     Initials Effective Dates Name Provider Type Discipline     08/02/18 -  Judith Kimball, PT Physical Therapist PT              PT Recommendation and Plan             Time Calculation:   PT Charges     Row Name 03/23/21 1501 03/23/21 1154          Time Calculation    Start Time  1501  -DAVY  1125  -NW     Stop Time  1515  -DAVY  1150  -NW     Time Calculation (min)  14 min  -DAVY  25 min  -NW     PT Received On  03/23/21  -DAVY  03/23/21  -NW     PT Goal Re-Cert Due Date  --  03/30/21  -NW        Time Calculation- PT    Total Timed Code Minutes- PT  14 minute(s)  -DAVY  25 minute(s)  -NW        Timed Charges    59440 - Gait Training Minutes   14  -DAVY  25  -NW       User Key  (r) = Recorded By, (t) = Taken By, (c) = Cosigned By    Initials Name Provider Type    Derrick Carson PTA Physical Therapy Assistant    NW Gogo Graf PTA Physical Therapy Assistant        Therapy Charges for Today     Code Description Service Date Service Provider Modifiers Qty    12403883791 HC GAIT TRAINING EA 15 MIN 3/23/2021 Derrick Gould PTA GP 1          PT G-Codes  Outcome Measure Options: AM-PAC 6 Clicks Basic Mobility (PT)  AM-PAC 6 Clicks Score (PT): 12    Derrick Gould PTA  3/23/2021

## 2021-03-24 ENCOUNTER — NURSE TRIAGE (OUTPATIENT)
Dept: CALL CENTER | Facility: HOSPITAL | Age: 51
End: 2021-03-24

## 2021-03-24 VITALS
WEIGHT: 250.1 LBS | TEMPERATURE: 98.8 F | BODY MASS INDEX: 46.02 KG/M2 | HEART RATE: 75 BPM | OXYGEN SATURATION: 96 % | SYSTOLIC BLOOD PRESSURE: 138 MMHG | HEIGHT: 62 IN | DIASTOLIC BLOOD PRESSURE: 77 MMHG | RESPIRATION RATE: 18 BRPM

## 2021-03-24 PROBLEM — I50.20 SYSTOLIC CONGESTIVE HEART FAILURE: Status: RESOLVED | Noted: 2021-03-18 | Resolved: 2021-03-24

## 2021-03-24 LAB
ANION GAP SERPL CALCULATED.3IONS-SCNC: 12 MMOL/L (ref 5–15)
BUN SERPL-MCNC: 19 MG/DL (ref 6–20)
BUN/CREAT SERPL: 30.2 (ref 7–25)
CALCIUM SPEC-SCNC: 9 MG/DL (ref 8.6–10.5)
CHLORIDE SERPL-SCNC: 95 MMOL/L (ref 98–107)
CO2 SERPL-SCNC: 29 MMOL/L (ref 22–29)
CREAT SERPL-MCNC: 0.63 MG/DL (ref 0.57–1)
GFR SERPL CREATININE-BSD FRML MDRD: 100 ML/MIN/1.73
GLUCOSE BLDC GLUCOMTR-MCNC: 187 MG/DL (ref 70–130)
GLUCOSE BLDC GLUCOMTR-MCNC: 325 MG/DL (ref 70–130)
GLUCOSE SERPL-MCNC: 184 MG/DL (ref 65–99)
POTASSIUM SERPL-SCNC: 4.1 MMOL/L (ref 3.5–5.2)
SODIUM SERPL-SCNC: 136 MMOL/L (ref 136–145)

## 2021-03-24 PROCEDURE — 25010000002 FUROSEMIDE PER 20 MG: Performed by: THORACIC SURGERY (CARDIOTHORACIC VASCULAR SURGERY)

## 2021-03-24 PROCEDURE — 99024 POSTOP FOLLOW-UP VISIT: CPT | Performed by: NURSE PRACTITIONER

## 2021-03-24 PROCEDURE — 94799 UNLISTED PULMONARY SVC/PX: CPT

## 2021-03-24 PROCEDURE — 82962 GLUCOSE BLOOD TEST: CPT

## 2021-03-24 PROCEDURE — 80048 BASIC METABOLIC PNL TOTAL CA: CPT | Performed by: NURSE PRACTITIONER

## 2021-03-24 PROCEDURE — 25010000002 ENOXAPARIN PER 10 MG: Performed by: THORACIC SURGERY (CARDIOTHORACIC VASCULAR SURGERY)

## 2021-03-24 PROCEDURE — 63710000001 INSULIN LISPRO (HUMAN) PER 5 UNITS: Performed by: NURSE PRACTITIONER

## 2021-03-24 RX ORDER — LISINOPRIL 2.5 MG/1
2.5 TABLET ORAL
Status: DISCONTINUED | OUTPATIENT
Start: 2021-03-24 | End: 2021-03-24 | Stop reason: HOSPADM

## 2021-03-24 RX ORDER — SPIRONOLACTONE 50 MG/1
50 TABLET, FILM COATED ORAL DAILY
Status: ON HOLD | COMMUNITY
End: 2022-02-01

## 2021-03-24 RX ORDER — LISINOPRIL 5 MG/1
5 TABLET ORAL DAILY
Qty: 30 TABLET | Refills: 2 | Status: ON HOLD | OUTPATIENT
Start: 2021-03-24 | End: 2022-02-01

## 2021-03-24 RX ORDER — PANTOPRAZOLE SODIUM 40 MG/1
40 TABLET, DELAYED RELEASE ORAL DAILY
Qty: 30 TABLET | Refills: 0 | Status: ON HOLD | OUTPATIENT
Start: 2021-03-24 | End: 2022-02-01

## 2021-03-24 RX ORDER — OXYCODONE HYDROCHLORIDE AND ACETAMINOPHEN 5; 325 MG/1; MG/1
1 TABLET ORAL EVERY 6 HOURS PRN
Qty: 30 TABLET | Refills: 0 | Status: SHIPPED | OUTPATIENT
Start: 2021-03-24 | End: 2022-02-01

## 2021-03-24 RX ORDER — CLOPIDOGREL BISULFATE 75 MG/1
75 TABLET ORAL DAILY
Qty: 30 TABLET | Refills: 2 | Status: ON HOLD | OUTPATIENT
Start: 2021-03-24 | End: 2022-02-01

## 2021-03-24 RX ORDER — HYDRALAZINE HYDROCHLORIDE 50 MG/1
50 TABLET, FILM COATED ORAL 2 TIMES DAILY
COMMUNITY
End: 2021-03-24 | Stop reason: HOSPADM

## 2021-03-24 RX ORDER — ATORVASTATIN CALCIUM 40 MG/1
40 TABLET, FILM COATED ORAL NIGHTLY
Qty: 30 TABLET | Refills: 2 | Status: ON HOLD | OUTPATIENT
Start: 2021-03-24 | End: 2022-02-01

## 2021-03-24 RX ADMIN — ACETAMINOPHEN 650 MG: 325 TABLET ORAL at 12:59

## 2021-03-24 RX ADMIN — ASPIRIN 81 MG: 81 TABLET, CHEWABLE ORAL at 09:06

## 2021-03-24 RX ADMIN — BISACODYL 10 MG: 5 TABLET ORAL at 09:06

## 2021-03-24 RX ADMIN — POTASSIUM CHLORIDE 20 MEQ: 750 CAPSULE, EXTENDED RELEASE ORAL at 12:59

## 2021-03-24 RX ADMIN — INSULIN LISPRO 10 UNITS: 100 INJECTION, SOLUTION INTRAVENOUS; SUBCUTANEOUS at 12:58

## 2021-03-24 RX ADMIN — INSULIN LISPRO 3 UNITS: 100 INJECTION, SOLUTION INTRAVENOUS; SUBCUTANEOUS at 09:06

## 2021-03-24 RX ADMIN — LEVOTHYROXINE SODIUM 150 MCG: 150 TABLET ORAL at 06:04

## 2021-03-24 RX ADMIN — POTASSIUM CHLORIDE 20 MEQ: 750 CAPSULE, EXTENDED RELEASE ORAL at 09:05

## 2021-03-24 RX ADMIN — PREGABALIN 25 MG: 25 CAPSULE ORAL at 09:06

## 2021-03-24 RX ADMIN — METOPROLOL TARTRATE 25 MG: 25 TABLET, FILM COATED ORAL at 09:05

## 2021-03-24 RX ADMIN — METHOCARBAMOL 500 MG: 500 TABLET, FILM COATED ORAL at 12:59

## 2021-03-24 RX ADMIN — LIDOCAINE 2 PATCH: 50 PATCH CUTANEOUS at 09:06

## 2021-03-24 RX ADMIN — ACETAMINOPHEN 650 MG: 325 TABLET ORAL at 06:04

## 2021-03-24 RX ADMIN — IPRATROPIUM BROMIDE AND ALBUTEROL SULFATE 1.5 ML: 2.5; .5 SOLUTION RESPIRATORY (INHALATION) at 07:13

## 2021-03-24 RX ADMIN — FUROSEMIDE 40 MG: 10 INJECTION, SOLUTION INTRAMUSCULAR; INTRAVENOUS at 09:06

## 2021-03-24 RX ADMIN — ENOXAPARIN SODIUM 30 MG: 30 INJECTION SUBCUTANEOUS at 09:06

## 2021-03-24 RX ADMIN — FUROSEMIDE 40 MG: 10 INJECTION, SOLUTION INTRAMUSCULAR; INTRAVENOUS at 12:59

## 2021-03-24 RX ADMIN — METHOCARBAMOL 500 MG: 500 TABLET, FILM COATED ORAL at 06:04

## 2021-03-24 RX ADMIN — POLYETHYLENE GLYCOL (3350) 17 G: 17 POWDER, FOR SOLUTION ORAL at 09:06

## 2021-03-24 RX ADMIN — LISINOPRIL 2.5 MG: 2.5 TABLET ORAL at 09:23

## 2021-03-24 RX ADMIN — SILVER SULFADIAZINE: 10 CREAM TOPICAL at 09:06

## 2021-03-24 NOTE — PLAN OF CARE
Problem: Adult Inpatient Plan of Care  Goal: Plan of Care Review  Outcome: Ongoing, Progressing  Flowsheets (Taken 3/24/2021 0447)  Plan of Care Reviewed With: patient  Outcome Summary: Patient S 63-76.  All incisions have Pvovena wound vac's in place, no new drainage noted.  Neck wound appears to some better and patient states it feels some better with the Silvadene cream applied. O>I.  Patient with good productive cough.  Cont. to monitor. call for concerns.  patient with 3 lb wt loss.   Goal Outcome Evaluation:  Plan of Care Reviewed With: patient  Progress: improving  Outcome Summary: Patient S 63-76.  All incisions have Pvovena wound vac's in place, no new drainage noted.  Neck wound appears to some better and patient states it feels some better with the Silvadene cream applied. O>I.  Patient with good productive cough.  Cont. to monitor. call for concerns.  patient with 3 lb wt loss.

## 2021-03-24 NOTE — PROGRESS NOTES
"CABG-3V (LIMA/LAD, RSVG/OM, and RSVG/PDA) B open vein harvest, prevena incision management system to sternum, B LE harvest incisions    POD 5    Sitting up in the chair. Walking 300 feet with physical therapy. Eager for discharge home today. Remained on room air overnight. O2 sat 95% on RA this morning. Pulmonary toilet improved, IS 1500 ml. Weight down 3 pounds.     Telemetry: sinus 63-76 bpm    Visit Vitals  /59 (BP Location: Left arm, Patient Position: Sitting)   Pulse 73   Temp 97.8 °F (36.6 °C) (Oral)   Resp 16   Ht 157.5 cm (62.01\")   Wt 113 kg (250 lb 1.6 oz)   LMP 03/17/2020 (Approximate)   SpO2 95%   Breastfeeding No   BMI 45.73 kg/m²   RA  Baseline weight: 234 pounds      Intake/Output Summary (Last 24 hours) at 3/24/2021 1054  Last data filed at 3/24/2021 0900  Gross per 24 hour   Intake 600 ml   Output 3950 ml   Net -3350 ml     Labs:         Physical Exam:  General: No acute distress, in good spirits, up in chair.  Neck: Left neck skin change suspicious for thermal injury. Silvadene cream on.   Cardiovascular: RRR, no murmur, rubs, or gallops.    Pulmonary: Clear breath sounds bilaterally. No wheezing.   Chest: Prevena dressing to sternotomy site intact.   Abdomen: Soft, non distended, and non tender. Obese.   Extremities: Warm, moves all extremities. Bilateral lower extremities with prevenas intact (vein harvesting sites). Mild edema to bilateral lower extremities.   Neurologic: Grossly intact with no focal deficits.      Impression:  CAD  NSTEMI  Obesity, class III  Poorly controlled diabetes mellitus  Hyponatremia, asymptomatic  Leukocytosis  Chronic tobacco use   History of peptic ulcer      Medical decision-making/recommendations/plan:  DC pacing wires  Usual cardiac surgery post operative care  Continue diuresis  Encourage ambulation and pulmonary toilet    DW patient and nursing  DC home today. RTC on Friday for prevena removal with BMP prior to appt.     "

## 2021-03-24 NOTE — THERAPY DISCHARGE NOTE
Acute Care - Physical Therapy Discharge Summary  [facility]       Patient Name: Krys Cox  : 1970  MRN: [id number]    Today's Date: 3/24/2021                 Admit Date: 3/17/2021      PT Recommendation and Plan    Visit Dx:    ICD-10-CM ICD-9-CM   1. Coronary artery disease involving native coronary artery of native heart with unstable angina pectoris (CMS/[facility])  I25.110 414.01     411.1   2. NSTEMI (non-ST elevated myocardial infarction) (CMS/[facility])  I21.4 410.70   3. Impaired functional mobility and activity tolerance  Z74.09 V49.89   4. Electrolyte abnormality  E87.8 276.9               Rehab Goal Summary     Row Name 21 1515             Bed Mobility Goal 1 (PT)    Activity/Assistive Device (Bed Mobility Goal 1, PT)  scooting;sit to supine/supine to sit  -AB      [place] Level/Cues Needed (Bed Mobility Goal 1, PT)  standby assist  -AB      Time Frame (Bed Mobility Goal 1, PT)  long term goal (LTG);10 days  -AB      Progress/Outcomes (Bed Mobility Goal 1, PT)  goal not met  -AB         Transfer Goal 1 (PT)    Activity/Assistive Device (Transfer Goal 1, PT)  sit-to-stand/stand-to-sit;bed-to-chair/chair-to-bed  -AB      [place] Level/Cues Needed (Transfer Goal 1, PT)  independent  -AB      Time Frame (Transfer Goal 1, PT)  long term goal (LTG);10 days  -AB      Progress/Outcome (Transfer Goal 1, PT)  goal not met  -AB         Gait Training Goal 1 (PT)    Activity/Assistive Device (Gait Training Goal 1, PT)  gait (walking locomotion);decrease fall risk;diminish gait deviation;improve balance and speed;increase endurance/gait distance;increase energy conservation  -AB      [place] Level (Gait Training Goal 1, PT)  standby assist;independent  -AB      Distance (Gait Training Goal 1, PT)  350+ ft  -AB      Time Frame (Gait Training Goal 1, PT)  long term goal (LTG);10 days  -AB      Progress/Outcome (Gait Training Goal 1, PT)  goal partially met  -AB         Stairs Goal 1 (PT)     Activity/Assistive Device (Stairs Goal 1, PT)  ascending stairs;descending stairs;step-to-step;decrease fall risk;improve balance and speed  -AB      [place] Level/Cues Needed (Stairs Goal 1, PT)  contact guard assist  -AB      Number of Stairs (Stairs Goal 1, PT)  1-2  -AB      Time Frame (Stairs Goal 1, PT)  long term goal (LTG);10 days  -AB      Progress/Outcome (Stairs Goal 1, PT)  goal not met  -AB         Patient Education Goal (PT)    Activity (Patient Education Goal, PT)  cardiac HEP  -AB      [place]/Cues/Accuracy (Memory Goal 2, PT)  demonstrates adequately;independent;verbalizes understanding  -AB      Time Frame (Patient Education Goal, PT)  long term goal (LTG);10 days  -AB      Progress/Outcome (Patient Education Goal, PT)  goal met  -AB        User Key  (r) = Recorded By, (t) = Taken By, (c) = Cosigned By    Initials Name Provider Type Discipline    Waleska Valentin, PTA Physical Therapy Assistant PT              PT Discharge Summary  Anticipated Discharge Disposition (PT): home with assist, home with 24/7 care  Reason for Discharge: Discharge from facility  Outcomes Achieved: Refer to plan of care for updates on goals achieved  Discharge Destination: Home with assist      Waleska Moreira PTA   3/24/2021

## 2021-03-25 ENCOUNTER — READMISSION MANAGEMENT (OUTPATIENT)
Dept: CALL CENTER | Facility: HOSPITAL | Age: 51
End: 2021-03-25

## 2021-03-25 NOTE — OUTREACH NOTE
Prep Survey      Responses   [facility] facility patient discharged from?  [place]   Is LACE score < 7 ?  No   Emergency Room discharge w/ pulse ox?  No   Eligibility  Readm Mgmt   Discharge diagnosis  NSTEMI [s/p CORONARY ARTERY BYPASS GRAFT X 3 WITH LEFT INTERNAL MAMMARY ARTERY, BILATERAL LOWER EXTREMITY OPEN VEIN HARVEST, AND PERFUSION,  APPLICATION OF PREVENA INCISIONAL WOUND VAC X 3,  TRANSESOPHAGEAL ECHOCARDIOGRAM]   Does the patient have one of the following disease processes/diagnoses(primary or secondary)?  Cardiothoracic surgery   Does the patient have Home health ordered?  No   Is there a DME ordered?  No   Comments regarding appointments  Per AVS   Medication alerts for this patient  Continue Aspirin, start Plavix.  Meds per AVS.   Prep survey completed?  Yes          Kerrie Renteria RN

## 2021-03-25 NOTE — TELEPHONE ENCOUNTER
"Just had bypass surgery is having chest muscle spasms. Pt will call doctor on call for surgeon.     Reason for Disposition  • [1] SEVERE pain (e.g., excruciating, unable to do any normal activities) AND [2] not improved 2 hours after pain medicine    Additional Information  • Negative: Shock suspected (e.g., cold/pale/clammy skin, too weak to stand, low BP, rapid pulse)  • Negative: Difficult to awaken or acting confused (e.g., disoriented, slurred speech)  • Negative: Sounds like a life-threatening emergency to the triager  • Negative: Chest pain  • Negative: Arm pains with exertion (e.g., walking)  • Negative: Muscle aches from influenza (flu) suspected  • Negative: Sickle cell pain crisis (sickle cell attack) suspected  • Negative: Muscle aches from heat exposure suspected  • Negative: Lyme disease suspected (e.g., bull's eye rash or tick bite / exposure in past month)  • Negative: Pain only in back  • Negative: Pain in one arm OR arm pains caused by recent vigorous activity (e.g., sports, lifting, overuse)  • Negative: Pain in one leg OR leg pains caused by recent vigorous activity (e.g. sports, lifting, overuse)  • Negative: Rash over large area or most of the body (widespread or generalized)  • Negative: Dark (cola colored) or red-colored urine  • Negative: [1] Drinking very little AND [2] dehydration suspected (e.g., no urine > 12 hours, very dry mouth, very lightheaded)  • Negative: Patient sounds very sick or weak to the triager  • Negative: [1] SEVERE pain AND [2] taking a statin medicine (a lipid or cholesterol lowering drug)  • Negative: Fever > 104 F (40 C)    Answer Assessment - Initial Assessment Questions  1. ONSET: \"When did the muscle aches or body pains start?\"        Today just released after bypass x today  2. LOCATION: \"What part of your body is hurting?\" (e.g., entire body, arms, legs)       chest  3. SEVERITY: \"How bad is the pain?\" (Scale 1-10; or mild, moderate, severe)    - MILD (1-3): " "doesn't interfere with normal activities     - MODERATE (4-7): interferes with normal activities or awakens from sleep     - SEVERE (8-10):  excruciating pain, unable to do any normal activities       mod  4. CAUSE: \"What do you think is causing the pains?\"      Had them in hospital also  5. FEVER: \"Have you been having fever?\"      no  6. OTHER SYMPTOMS: \"Do you have any other symptoms?\" (e.g., chest pain, weakness, rash, cold or flu symptoms, weight loss)      n/a  7. PREGNANCY: \"Is there any chance you are pregnant?\" \"When was your last menstrual period?\"      no  8. TRAVEL: \"Have you traveled out of the country in the last month?\" (e.g., travel history, exposures)      no    Protocols used: MUSCLE ACHES AND BODY PAIN-ADULT-AH      "

## 2021-03-26 ENCOUNTER — LAB (OUTPATIENT)
Dept: LAB | Facility: HOSPITAL | Age: 51
End: 2021-03-26

## 2021-03-26 ENCOUNTER — OFFICE VISIT (OUTPATIENT)
Dept: CARDIAC SURGERY | Facility: CLINIC | Age: 51
End: 2021-03-26

## 2021-03-26 VITALS
HEIGHT: 62 IN | BODY MASS INDEX: 45.71 KG/M2 | DIASTOLIC BLOOD PRESSURE: 70 MMHG | WEIGHT: 248.4 LBS | SYSTOLIC BLOOD PRESSURE: 137 MMHG | HEART RATE: 69 BPM | OXYGEN SATURATION: 96 %

## 2021-03-26 DIAGNOSIS — Z72.0 TOBACCO USE: ICD-10-CM

## 2021-03-26 DIAGNOSIS — E66.01 CLASS 3 SEVERE OBESITY DUE TO EXCESS CALORIES WITH SERIOUS COMORBIDITY AND BODY MASS INDEX (BMI) OF 45.0 TO 49.9 IN ADULT (HCC): ICD-10-CM

## 2021-03-26 DIAGNOSIS — I21.4 NSTEMI (NON-ST ELEVATED MYOCARDIAL INFARCTION) (HCC): ICD-10-CM

## 2021-03-26 DIAGNOSIS — E87.8 ELECTROLYTE ABNORMALITY: ICD-10-CM

## 2021-03-26 DIAGNOSIS — E11.65 POORLY CONTROLLED DIABETES MELLITUS (HCC): ICD-10-CM

## 2021-03-26 DIAGNOSIS — R91.8 LUNG NODULES: ICD-10-CM

## 2021-03-26 DIAGNOSIS — I25.110 CORONARY ARTERY DISEASE INVOLVING NATIVE CORONARY ARTERY OF NATIVE HEART WITH UNSTABLE ANGINA PECTORIS (HCC): Primary | ICD-10-CM

## 2021-03-26 LAB
ANION GAP SERPL CALCULATED.3IONS-SCNC: 11 MMOL/L (ref 5–15)
BUN SERPL-MCNC: 11 MG/DL (ref 6–20)
BUN/CREAT SERPL: 21.6 (ref 7–25)
CALCIUM SPEC-SCNC: 9.2 MG/DL (ref 8.6–10.5)
CHLORIDE SERPL-SCNC: 97 MMOL/L (ref 98–107)
CO2 SERPL-SCNC: 28 MMOL/L (ref 22–29)
CREAT SERPL-MCNC: 0.51 MG/DL (ref 0.57–1)
GFR SERPL CREATININE-BSD FRML MDRD: 128 ML/MIN/1.73
GLUCOSE SERPL-MCNC: 216 MG/DL (ref 65–99)
POTASSIUM SERPL-SCNC: 4.5 MMOL/L (ref 3.5–5.2)
SODIUM SERPL-SCNC: 136 MMOL/L (ref 136–145)

## 2021-03-26 PROCEDURE — 36415 COLL VENOUS BLD VENIPUNCTURE: CPT

## 2021-03-26 PROCEDURE — 80048 BASIC METABOLIC PNL TOTAL CA: CPT

## 2021-03-26 PROCEDURE — 99024 POSTOP FOLLOW-UP VISIT: CPT | Performed by: NURSE PRACTITIONER

## 2021-03-26 NOTE — PROGRESS NOTES
"Subjective   Chief Complaint   Patient presents with   • Post-op Follow-up     Pt had CABG x3 on 3/19       Patient ID: Krys Cox is a 51 y.o. female who is here for follow-up having had Coronary artery bypass grafting-3 vessel (left internal mammary artery/left anterior descending, reverse saphenous vein graft/second obtuse marginal, and reverse saphenous vein graft/posterior descending artery), bilateral open vein harvests, Prevena Incision management system performed on 3/19/2021 by Dr. Thomas.    History of Present Illness  Post operative recovery was uneventful without any major complications. She returns for follow up and prevena removal. She is joined by her . Sleep habits are fair. Pain control has been good. No fevers/sweats/chills. No drainage from incisions.  No sternal clicks. No cardiac chest pain or shortness of breath. Appetite is fair. Glycemic control is good,  this morning. She has not smoked since discharge home! Ambulating 5 minutes twice daily.     The following portions of the patient's history were reviewed and updated as appropriate: allergies, current medications, past family history, past medical history, past social history, past surgical history and problem list.    Review of Systems   Constitutional: Negative for chills, diaphoresis and fever.   Respiratory: Positive for shortness of breath (in exertion). Negative for wheezing.    Cardiovascular: Positive for chest pain (incisional ) and leg swelling. Negative for palpitations.   Gastrointestinal: Positive for constipation. Negative for diarrhea, nausea and vomiting.   Neurological: Positive for weakness.       Objective   Visit Vitals  /70 (BP Location: Right arm, Patient Position: Sitting, Cuff Size: Adult)   Pulse 69   Ht 157.5 cm (62.01\")   Wt 113 kg (248 lb 6.4 oz)   LMP 03/17/2020 (Approximate)   SpO2 96%   BMI 45.42 kg/m²       Physical Exam  Vitals reviewed.   Constitutional:       General: She is not " in acute distress.     Appearance: She is well-developed. She is not diaphoretic.   HENT:      Head: Normocephalic.   Eyes:      Pupils: Pupils are equal, round, and reactive to light.   Neck:      Vascular: No JVD.   Cardiovascular:      Rate and Rhythm: Normal rate and regular rhythm.      Heart sounds: Normal heart sounds. No murmur heard.   No friction rub.   Pulmonary:      Effort: Pulmonary effort is normal. No respiratory distress.      Breath sounds: Normal breath sounds. No stridor. No wheezing or rales.   Abdominal:      General: There is no distension.      Palpations: Abdomen is soft.      Tenderness: There is no abdominal tenderness.   Musculoskeletal:      Cervical back: Normal range of motion and neck supple.      Right lower leg: Edema present.      Left lower leg: Edema present.      Comments: Mild lower extremity edema    Skin:     General: Skin is warm and dry.      Coloration: Skin is not pale.      Findings: No erythema or rash.      Comments: Prevena removed from sternal incision and bilateral lower extremities. All incisions are well approximated and without drainage. Sternotomy incision clean, dry, and intact. No clicks. Sternum stable. Saphenectomy site clean, dry, and intact. Skin affix and steri strips applied to all incisions per routine.   Left neck thermal injury with skin some crusting present. Left lateral buttocks with small abrasion.    Neurological:      Mental Status: She is alert and oriented to person, place, and time.      Cranial Nerves: No cranial nerve deficit.   Psychiatric:         Behavior: Behavior normal.         Lab on 03/26/2021   Component Date Value Ref Range Status   • Glucose 03/26/2021 216* 65 - 99 mg/dL Final   • BUN 03/26/2021 11  6 - 20 mg/dL Final   • Creatinine 03/26/2021 0.51* 0.57 - 1.00 mg/dL Final   • Sodium 03/26/2021 136  136 - 145 mmol/L Final   • Potassium 03/26/2021 4.5  3.5 - 5.2 mmol/L Final   • Chloride 03/26/2021 97* 98 - 107 mmol/L Final   •  CO2 03/26/2021 28.0  22.0 - 29.0 mmol/L Final   • Calcium 03/26/2021 9.2  8.6 - 10.5 mg/dL Final   • eGFR Non African Amer 03/26/2021 128  >60 mL/min/1.73 Final   • BUN/Creatinine Ratio 03/26/2021 21.6  7.0 - 25.0 Final   • Anion Gap 03/26/2021 11.0  5.0 - 15.0 mmol/L Final       Assessment/Plan         Diagnoses and all orders for this visit:    1. Coronary artery disease involving native coronary artery of native heart with unstable angina pectoris (CMS/[facility]) (Primary)    2. NSTEMI (non-ST elevated myocardial infarction) (CMS/[facility])    3. Class 3 severe obesity due to excess calories with serious comorbidity and body mass index (BMI) of 45.0 to 49.9 in adult (CMS/[facility])    4. Poorly controlled diabetes mellitus (CMS/[facility])    5. Tobacco use    6. Lung nodules         Overall, Krys Cox is doing well. Prevenas were removed without remark. Surgical sites are clean and dry without evidence of infection. Encouraged glycemic control. She has follow up with PCP early next week. Continue silvadene cream to left neck for now-will reassess next week. We discussed current sternotomy precautions and how these will not be advanced yet. Following post op cardiac surgery home instructions. Provided support and encouragement. All questions have been answered to the best of my ability. Will discuss starting cardiac rehabilitation at official 1 month post op visit. Patient has follow up with Dr. Thomas in a few weeks. Patient has follow up with Cardiology in a few weeks. Patient has been instructed to contact our office with any questions or concerns should they arise prior to the next office visit. RTC in 1 week for wound check.     Patient's Body mass index is 45.42 kg/m². BMI is above normal parameters. Recommendations include: educational material and referral to primary care.    We discussed the need to discontinue smoking as this reduces the overall graft patency.   I have congratulated Krys Cox on successful  smoking cessation thus far. Krys Cox  reports that she quit smoking about 2 weeks ago. Her smoking use included cigarettes. She started smoking about 35 years ago. She has a 30.00 pack-year smoking history. She has never used smokeless tobacco. I have educated her on the risk of diseases from using tobacco products such as cancer, COPD and heart disease.      Advance Care Planning   ACP discussion was declined by the patient. n/a as patient is under 65 years of age.

## 2021-03-30 ENCOUNTER — READMISSION MANAGEMENT (OUTPATIENT)
Dept: CALL CENTER | Facility: HOSPITAL | Age: 51
End: 2021-03-30

## 2021-03-30 NOTE — OUTREACH NOTE
CT Surgery Week 1 Survey      Responses   [facility] patient discharged from?  [place]   Does the patient have one of the following disease processes/diagnoses(primary or secondary)?  Cardiothoracic surgery   Week 1 attempt successful?  No   Unsuccessful attempts  Attempt 1            Kimberlee Walsh RN

## 2021-03-31 ENCOUNTER — READMISSION MANAGEMENT (OUTPATIENT)
Dept: CALL CENTER | Facility: HOSPITAL | Age: 51
End: 2021-03-31

## 2021-03-31 ENCOUNTER — OFFICE VISIT (OUTPATIENT)
Dept: CARDIAC SURGERY | Facility: CLINIC | Age: 51
End: 2021-03-31

## 2021-03-31 VITALS
DIASTOLIC BLOOD PRESSURE: 72 MMHG | HEIGHT: 62 IN | WEIGHT: 239.8 LBS | OXYGEN SATURATION: 99 % | HEART RATE: 69 BPM | SYSTOLIC BLOOD PRESSURE: 132 MMHG | BODY MASS INDEX: 44.13 KG/M2

## 2021-03-31 DIAGNOSIS — I21.4 NSTEMI (NON-ST ELEVATED MYOCARDIAL INFARCTION) (HCC): ICD-10-CM

## 2021-03-31 DIAGNOSIS — E11.65 POORLY CONTROLLED DIABETES MELLITUS (HCC): ICD-10-CM

## 2021-03-31 DIAGNOSIS — I25.110 CORONARY ARTERY DISEASE INVOLVING NATIVE CORONARY ARTERY OF NATIVE HEART WITH UNSTABLE ANGINA PECTORIS (HCC): Primary | ICD-10-CM

## 2021-03-31 DIAGNOSIS — Z79.899 TAKING A STATIN MEDICATION: ICD-10-CM

## 2021-03-31 DIAGNOSIS — R91.8 LUNG NODULES: ICD-10-CM

## 2021-03-31 DIAGNOSIS — Z87.891 FORMER SMOKER: ICD-10-CM

## 2021-03-31 PROCEDURE — 99024 POSTOP FOLLOW-UP VISIT: CPT | Performed by: NURSE PRACTITIONER

## 2021-03-31 NOTE — OUTREACH NOTE
CT Surgery Week 1 Survey      Responses   [facility] patient discharged from?  [place]   Does the patient have one of the following disease processes/diagnoses(primary or secondary)?  Cardiothoracic surgery   Week 1 attempt successful?  No   Unsuccessful attempts  Attempt 2            Radha Chandler RN

## 2021-04-01 PROBLEM — Z87.891 FORMER SMOKER: Status: ACTIVE | Noted: 2021-04-01

## 2021-04-01 NOTE — PROGRESS NOTES
"Subjective   Chief Complaint   Patient presents with   • Post-op Follow-up     CABG x3 on 3/19     Patient ID: Krys oCx is a 51 y.o. female who is here for follow-up having had Coronary artery bypass grafting-3 vessel (left internal mammary artery/left anterior descending, reverse saphenous vein graft/second obtuse marginal, and reverse saphenous vein graft/posterior descending artery), bilateral open vein harvests, Prevena Incision management system performed on 3/19/2021 by Dr. Thomas.    History of Present Illness  Post operative recovery was uneventful without any major complications. She returns for wound check. She is joined by her . Sleep habits are fair but improving. Pain control has been good. No fevers/sweats/chills. Minimal drainage from sternotomy site with dislodged steri strips. No sternal clicks. No cardiac chest pain or shortness of breath. Appetite is fair. Blood glucose 240 this am. She has seen PCP and levemir has been increased. Wearing compression stockings and lower extremity edema is greatly improved. Weight down 9 pounds. She has not smoked since discharge home! Ambulating 10 minutes twice daily but wondering if she can do more.     The following portions of the patient's history were reviewed and updated as appropriate: allergies, current medications, past family history, past medical history, past social history, past surgical history and problem list.    Review of Systems   Constitutional: Negative for chills, diaphoresis and fever.   Respiratory: Negative for shortness of breath and wheezing.    Cardiovascular: Positive for chest pain (incisional ) and leg swelling. Negative for palpitations.   Gastrointestinal: Positive for diarrhea (\"too many laxatives\"). Negative for constipation, nausea and vomiting.   Neurological: Positive for weakness (improving ).       Objective   Visit Vitals  /72 (BP Location: Left arm, Patient Position: Sitting, Cuff Size: Large Adult) " "  Pulse 69   Ht 157.5 cm (62.01\")   Wt 109 kg (239 lb 12.8 oz)   LMP 03/17/2020 (Approximate)   SpO2 99%   BMI 43.85 kg/m²       Physical Exam  Vitals reviewed.   Constitutional:       General: She is not in acute distress.     Appearance: She is well-developed. She is not diaphoretic.   HENT:      Head: Normocephalic.   Eyes:      Pupils: Pupils are equal, round, and reactive to light.   Neck:      Vascular: No JVD.   Cardiovascular:      Rate and Rhythm: Normal rate and regular rhythm.      Heart sounds: Normal heart sounds. No murmur heard.   No friction rub.   Pulmonary:      Effort: Pulmonary effort is normal. No respiratory distress.      Breath sounds: Normal breath sounds. No stridor. No wheezing or rales.   Abdominal:      General: There is no distension.      Palpations: Abdomen is soft.      Tenderness: There is no abdominal tenderness.   Musculoskeletal:      Cervical back: Normal range of motion and neck supple.      Right lower leg: Edema (minimal ) present.      Left lower leg: Edema (minimal ) present.      Comments: Mild lower extremity edema    Skin:     General: Skin is warm and dry.      Coloration: Skin is not pale.      Findings: No erythema or rash.      Comments: Sternotomy site with steri strips in place, however they are dislodged from mid to lower portion-removed. no drainage or evidence of infection. Advised to keep site clean and dry with gauze dressing. No clicks. Sternum stable. Saphenectomy sites clean and dry with steri strips intact.    Previous thermal injury to left neck nearly healed. Left lateral buttocks abrasion healed per patient report.    Neurological:      Mental Status: She is alert and oriented to person, place, and time.      Cranial Nerves: No cranial nerve deficit.   Psychiatric:         Behavior: Behavior normal.         Assessment/Plan         Diagnoses and all orders for this visit:    1. Coronary artery disease involving native coronary artery of native heart with " unstable angina pectoris (CMS/HCC) (Primary)    2. NSTEMI (non-ST elevated myocardial infarction) (CMS/HCC)    3. Poorly controlled diabetes mellitus (CMS/HCC)    4. Former smoker    5. Taking a statin medication    6. Lung nodules         Overall, Krys Cox is doing well. Surgical sites are clean and dry without evidence of infection. Advised to keep sternotomy site clean and dry with use of gauze. She is working on glycemic control with her PCP. Discontinue silvadene cream to left neck as previous injury has nearly resolved. We discussed current sternotomy precautions and how these will not be advanced yet. Following post op cardiac surgery home instructions. OK to walk more than the recommendations from the walking handout, discussed those are the minimum expectations. Provided support and encouragement. All questions have been answered to the best of my ability. Will discuss starting cardiac rehabilitation at official 1 month post op visit. Patient has follow up with Dr. Thomas in a few weeks. Patient has follow up with Cardiology in a few weeks. Patient has been instructed to contact our office with any questions or concerns should they arise prior to the next office visit. RTC in 1 week for wound check.     Patient's Body mass index is 43.85 kg/m². BMI is above normal parameters. Recommendations include: educational material and referral to primary care.    We discussed the need to discontinue smoking as this reduces the overall graft patency.   I have congratulated Krys Cox on successful smoking cessation thus far. Krys Cox  reports that she quit smoking about 2 weeks ago. Her smoking use included cigarettes. She started smoking about 35 years ago. She has a 30.00 pack-year smoking history. She has never used smokeless tobacco. I have educated her on the risk of diseases from using tobacco products such as cancer, COPD and heart disease.      Advance Care Planning   ACP  discussion was declined by the patient. n/a as patient is under 65 years of age.

## 2021-04-02 ENCOUNTER — READMISSION MANAGEMENT (OUTPATIENT)
Dept: CALL CENTER | Facility: HOSPITAL | Age: 51
End: 2021-04-02

## 2021-04-02 NOTE — OUTREACH NOTE
CT Surgery Week 1 Survey      Responses   [facility] patient discharged from?  [place]   Does the patient have one of the following disease processes/diagnoses(primary or secondary)?  Cardiothoracic surgery   Week 1 attempt successful?  Yes   Call start time  1044   Call end time  1049   Discharge diagnosis  NSTEMI   Meds reviewed with patient/caregiver?  Yes   Is the patient having any side effects they believe may be caused by any medication additions or changes?  No   Does the patient have all medications related to this admission filled (includes all antibiotics, pain medications, cardiac medications, etc.)  Yes   Is the patient taking all medications as directed (includes completed medication regime)?  Yes   Does the patient have a primary care provider?   Yes   Does the patient have an appointment scheduled with their C/T surgeon?  Yes   Has the patient kept scheduled appointments due by today?  Yes   Psychosocial issues?  No   Did the patient receive a copy of their discharge instructions?  Yes   Nursing interventions  Reviewed instructions with patient   What is the patient's perception of their health status since discharge?  Improving   Nursing interventions  Nurse provided patient education   Is the patient/caregiver able to teach back normal signs of recovery?  Constipation, Pain or discomfort at incisional site   Nursing interventions  Reassured on normal signs of recovery   Is the patient /caregiver able to teach back basic post-op care?  Continue use of incentive spirometry at least 1 week post discharge, Lifting as instructed by MD in discharge instructions   Is the patient/caregiver able to teach back signs and symptoms of incisional infection?  Increased redness, swelling or pain at the incisonal site, Incisional warmth, Fever, Pus or odor from incision, Increased drainage or bleeding   Is the patient/caregiver able to teach back steps to recovery at home?  Set small, achievable goals for  return to baseline health, Make a list of questions for surgeon's appointment   If the patient is a current smoker, are they able to teach back resources for cessation?  Not a smoker   Is the patient/caregiver able to teach back the hierarchy of who to call/visit for symptoms/problems? PCP, Specialist, Home health nurse, Urgent Care, ED, 911  Yes   Week 1 call completed?  Yes   Wrap up additional comments  doing well has already seen PCP            Zneaida Blanc, RN

## 2021-04-07 ENCOUNTER — OFFICE VISIT (OUTPATIENT)
Dept: CARDIAC SURGERY | Facility: CLINIC | Age: 51
End: 2021-04-07

## 2021-04-07 VITALS
BODY MASS INDEX: 42.91 KG/M2 | DIASTOLIC BLOOD PRESSURE: 68 MMHG | OXYGEN SATURATION: 97 % | HEIGHT: 62 IN | SYSTOLIC BLOOD PRESSURE: 119 MMHG | WEIGHT: 233.2 LBS | HEART RATE: 84 BPM

## 2021-04-07 DIAGNOSIS — E11.65 POORLY CONTROLLED DIABETES MELLITUS (HCC): ICD-10-CM

## 2021-04-07 DIAGNOSIS — I21.4 NSTEMI (NON-ST ELEVATED MYOCARDIAL INFARCTION) (HCC): Primary | ICD-10-CM

## 2021-04-07 DIAGNOSIS — Z87.891 FORMER SMOKER: ICD-10-CM

## 2021-04-07 DIAGNOSIS — Z79.899 ON STATIN THERAPY: ICD-10-CM

## 2021-04-07 DIAGNOSIS — R91.8 LUNG NODULES: ICD-10-CM

## 2021-04-07 DIAGNOSIS — E66.01 CLASS 3 SEVERE OBESITY DUE TO EXCESS CALORIES WITH SERIOUS COMORBIDITY AND BODY MASS INDEX (BMI) OF 40.0 TO 44.9 IN ADULT (HCC): ICD-10-CM

## 2021-04-07 DIAGNOSIS — I25.110 CORONARY ARTERY DISEASE INVOLVING NATIVE CORONARY ARTERY OF NATIVE HEART WITH UNSTABLE ANGINA PECTORIS (HCC): ICD-10-CM

## 2021-04-07 PROCEDURE — 99024 POSTOP FOLLOW-UP VISIT: CPT | Performed by: NURSE PRACTITIONER

## 2021-04-07 NOTE — PROGRESS NOTES
"Subjective   Chief Complaint   Patient presents with   • Post-op Follow-up     Pt had CABG on 3/19     Patient ID: Krys Cox is a 51 y.o. female who is here for follow-up having had Coronary artery bypass grafting-3 vessel (left internal mammary artery/left anterior descending, reverse saphenous vein graft/second obtuse marginal, and reverse saphenous vein graft/posterior descending artery), bilateral open vein harvests, Prevena Incision management system performed on 3/19/2021 by Dr. Thomas.    History of Present Illness  Post operative recovery was uneventful without any major complications. She returns for wound check. She is joined by her . Sleep habits are good. Pain control has been good. No fevers/sweats/chills. No further drainage from sternotomy. No sternal clicks. No cardiac chest pain or shortness of breath. Appetite is fair. Blood glucoses remain in the 200s and PCP has increased levemir again. Weight down 6 pounds. She remains smoke free! Ambulating 15 minutes twice daily. Saw Dr. Gavin this morning and he increased her lipitor.    The following portions of the patient's history were reviewed and updated as appropriate: allergies, current medications, past family history, past medical history, past social history, past surgical history and problem list.    Review of Systems   Constitutional: Negative for chills, diaphoresis and fever.   Respiratory: Negative for shortness of breath and wheezing.    Cardiovascular: Positive for chest pain (incisional ) and leg swelling (improving). Negative for palpitations.   Gastrointestinal: Negative for constipation, diarrhea, nausea and vomiting.   Neurological: Negative for weakness.       Objective   Visit Vitals  /68 (BP Location: Left arm, Patient Position: Sitting, Cuff Size: Adult)   Pulse 84   Ht 157.5 cm (62.01\")   Wt 106 kg (233 lb 3.2 oz)   LMP 03/17/2020 (Approximate)   SpO2 97%   BMI 42.64 kg/m²       Physical Exam  Vitals " reviewed.   Constitutional:       General: She is not in acute distress.     Appearance: She is well-developed. She is not diaphoretic.   HENT:      Head: Normocephalic.   Eyes:      Pupils: Pupils are equal, round, and reactive to light.   Neck:      Vascular: No JVD.   Cardiovascular:      Rate and Rhythm: Normal rate and regular rhythm.      Heart sounds: Normal heart sounds. No murmur heard.   No friction rub.   Pulmonary:      Effort: Pulmonary effort is normal. No respiratory distress.      Breath sounds: Normal breath sounds. No stridor. No wheezing or rales.   Abdominal:      General: There is no distension.      Palpations: Abdomen is soft.      Tenderness: There is no abdominal tenderness.   Musculoskeletal:      Cervical back: Normal range of motion and neck supple.      Right lower leg: Edema (minimal ) present.      Left lower leg: No edema.   Skin:     General: Skin is warm and dry.      Coloration: Skin is not pale.      Findings: No erythema or rash.      Comments: Sternotomy site clean and dry. Sternum stable. No clicks. Few areas of sternotomy site without scabs. Right saphenectomy site clean and dry with minimal edema and redness to distal site. Left saphenectomy site clean and dry. No drainage and no evidence of cellulitis. Previous thermal injury to left neck nearly healed. Left lateral buttocks abrasion healed per patient report.    Neurological:      Mental Status: She is alert and oriented to person, place, and time.      Cranial Nerves: No cranial nerve deficit.   Psychiatric:         Behavior: Behavior normal.         Assessment/Plan         Diagnoses and all orders for this visit:    1. NSTEMI (non-ST elevated myocardial infarction) (CMS/HCC) (Primary)    2. Coronary artery disease involving native coronary artery of native heart with unstable angina pectoris (CMS/[facility])    3. Poorly controlled diabetes mellitus (CMS/HCC)    4. Class 3 severe obesity due to excess calories with serious  comorbidity and body mass index (BMI) of 40.0 to 44.9 in adult (CMS/[facility])    5. Lung nodules    6. Former smoker    7. On statin therapy         Overall, Krys Cox is doing well. Surgical sites are clean and dry without evidence of infection. Advised to continue keeping sternotomy site clean and dry with use of gauze. Continue to work on glycemic control with PCP. We discussed current sternotomy precautions and how these will not be advanced yet. Following post op cardiac surgery home instructions. Provided support and encouragement. All questions have been answered to the best of my ability. Will discuss starting cardiac rehabilitation at official 1 month post op visit. Patient has follow up with Dr. Thomas in 2 weeks. Patient has been instructed to contact our office with any questions or concerns should they arise prior to the next office visit.     Patient's Body mass index is 42.64 kg/m². BMI is above normal parameters. Recommendations include: educational material and referral to primary care.    We discussed the need to discontinue smoking as this reduces the overall graft patency.   I have congratulated Krys Cox on successful smoking cessation thus far. Krys Cox  reports that she quit smoking about 3 weeks ago. Her smoking use included cigarettes. She started smoking about 35 years ago. She has a 30.00 pack-year smoking history. She has never used smokeless tobacco. I have educated her on the risk of diseases from using tobacco products such as cancer, COPD and heart disease.      Advance Care Planning   ACP discussion was declined by the patient. n/a as patient is under 66 years of age.

## 2021-04-08 ENCOUNTER — READMISSION MANAGEMENT (OUTPATIENT)
Dept: CALL CENTER | Facility: HOSPITAL | Age: 51
End: 2021-04-08

## 2021-04-08 NOTE — OUTREACH NOTE
CT Surgery Week 2 Survey      Responses   [facility] patient discharged from?  [place]   Does the patient have one of the following disease processes/diagnoses(primary or secondary)?  Cardiothoracic surgery   Week 2 attempt successful?  Yes   Call start time  1539   Call end time  1546   Discharge diagnosis  NSTEMI   Meds reviewed with patient/caregiver?  Yes   Is the patient taking all medications as directed (includes completed medication regime)?  Yes   Has the patient kept scheduled appointments due by today?  Yes   Psychosocial issues?  No   What is the patient's perception of their health status since discharge?  Improving   Nursing interventions  Nurse provided patient education   Is the patient/caregiver able to teach back normal signs of recovery?  Pain or discomfort at incisional site   Nursing interventions  Reassured on normal signs of recovery   Is the patient/caregiver able to teach back steps to recovery at home?  Set small, achievable goals for return to baseline health, Rest and rebuild strength, gradually increase activity   If the patient is a current smoker, are they able to teach back resources for cessation?  Not a smoker   Is the patient/caregiver able to teach back the hierarchy of who to call/visit for symptoms/problems? PCP, Specialist, Home health nurse, Urgent Care, ED, 911  Yes   Additional teach back comments  Enc pt to watch diet and monitor BS. She has not smoked since March. Pt asked about compression bra and I told her I was not 100% sure she had to continue wearing a bra but she could ask her surgeon about it.   Week 2 call completed?  Yes          Maddie Cabrera RN

## 2021-04-19 ENCOUNTER — READMISSION MANAGEMENT (OUTPATIENT)
Dept: CALL CENTER | Facility: HOSPITAL | Age: 51
End: 2021-04-19

## 2021-04-19 NOTE — OUTREACH NOTE
CT Surgery Week 3 Survey      Responses   [facility] patient discharged from?  [place]   Does the patient have one of the following disease processes/diagnoses(primary or secondary)?  Cardiothoracic surgery   Week 3 attempt successful?  Yes   Call start time  0957   Call end time  1005   Discharge diagnosis  NSTEMI   Meds reviewed with patient/caregiver?  Yes   Is the patient having any side effects they believe may be caused by any medication additions or changes?  No   Does the patient have all medications related to this admission filled (includes all antibiotics, pain medications, cardiac medications, etc.)  Yes   Is the patient taking all medications as directed (includes completed medication regime)?  Yes   Does the patient have a primary care provider?   Yes   Does the patient have an appointment scheduled with their C/T surgeon?  Yes   Has the patient kept scheduled appointments due by today?  Yes   Has home health visited the patient within 72 hours of discharge?  N/A   Psychosocial issues?  No   Did the patient receive a copy of their discharge instructions?  Yes   Nursing interventions  Reviewed instructions with patient   What is the patient's perception of their health status since discharge?  Improving   Nursing interventions  Nurse provided patient education   Is the patient/caregiver able to teach back normal signs of recovery?  Nausea and lack of appetite, Constipation, Depression or irritability, Pain or discomfort at incisional site   Nursing interventions  Reassured on normal signs of recovery   Is the patient /caregiver able to teach back basic post-op care?  Drive as instructed by MD in discharge instructions, Take showers only when approved by MD-sponge bathe until then, No tub bath, swimming, or hot tub until instructed by MD, Keep incision areas clean, dry and protected, Lifting as instructed by MD in discharge instructions   Is the patient/caregiver able to teach back signs and  symptoms of incisional infection?  Increased redness, swelling or pain at the incisonal site, Increased drainage or bleeding, Incisional warmth, Pus or odor from incision, Fever   Is the patient/caregiver able to teach back steps to recovery at home?  Set small, achievable goals for return to baseline health, Rest and rebuild strength, gradually increase activity, Eat a well-balance diet, Make a list of questions for surgeon's appointment, Practice good oral hygiene   Is the patient /caregiver able to teach back the importance of cardiac rehab?  Yes   Nursing interventions  Provided education on importance of cardiac rehab   If the patient is a current smoker, are they able to teach back resources for cessation?  Not a smoker   Is the patient/caregiver able to teach back the hierarchy of who to call/visit for symptoms/problems? PCP, Specialist, Home health nurse, Urgent Care, ED, 911  Yes   Week 3 call completed?  Yes          Mary Salgado LPN

## 2021-04-21 ENCOUNTER — OFFICE VISIT (OUTPATIENT)
Dept: CARDIAC SURGERY | Facility: CLINIC | Age: 51
End: 2021-04-21

## 2021-04-21 VITALS
OXYGEN SATURATION: 93 % | BODY MASS INDEX: 42.95 KG/M2 | DIASTOLIC BLOOD PRESSURE: 80 MMHG | SYSTOLIC BLOOD PRESSURE: 131 MMHG | WEIGHT: 233.4 LBS | HEIGHT: 62 IN | HEART RATE: 87 BPM

## 2021-04-21 DIAGNOSIS — E11.65 POORLY CONTROLLED DIABETES MELLITUS (HCC): ICD-10-CM

## 2021-04-21 DIAGNOSIS — I21.4 NSTEMI (NON-ST ELEVATED MYOCARDIAL INFARCTION) (HCC): Primary | ICD-10-CM

## 2021-04-21 DIAGNOSIS — R91.8 LUNG NODULES: ICD-10-CM

## 2021-04-21 DIAGNOSIS — Z87.891 FORMER SMOKER: ICD-10-CM

## 2021-04-21 DIAGNOSIS — I25.110 CORONARY ARTERY DISEASE INVOLVING NATIVE CORONARY ARTERY OF NATIVE HEART WITH UNSTABLE ANGINA PECTORIS (HCC): ICD-10-CM

## 2021-04-21 PROCEDURE — 99024 POSTOP FOLLOW-UP VISIT: CPT | Performed by: THORACIC SURGERY (CARDIOTHORACIC VASCULAR SURGERY)

## 2021-04-27 ENCOUNTER — READMISSION MANAGEMENT (OUTPATIENT)
Dept: CALL CENTER | Facility: HOSPITAL | Age: 51
End: 2021-04-27

## 2021-04-27 NOTE — OUTREACH NOTE
CT Surgery Week 4 Survey      Responses   [facility] patient discharged from?  [place]   Does the patient have one of the following disease processes/diagnoses(primary or secondary)?  Cardiothoracic surgery   Week 4 attempt successful?  Yes   Call start time  1458   Call end time  1500   Discharge diagnosis  NSTEMI   Meds reviewed with patient/caregiver?  Yes   Is the patient taking all medications as directed (includes completed medication regime)?  Yes   Has the patient kept scheduled appointments due by today?  Yes   Is the patient still receiving Home Health Services?  N/A   Psychosocial issues?  No   What is the patient's perception of their health status since discharge?  Improving   Is the patient /caregiver able to teach back the importance of cardiac rehab?  Yes   Nursing interventions  Provided education on importance of cardiac rehab   Is the patient/caregiver able to teach back the hierarchy of who to call/visit for symptoms/problems? PCP, Specialist, Home health nurse, Urgent Care, ED, 911  Yes   Additional teach back comments  pt doing well and will probably start cardiac rehab after seeing surgeon   Week 4 Call Completed?  Yes   Would the patient like one additional call?  No   Graduated  Yes   Is the patient interested in additional calls from an ambulatory ?  NOTE:  applies to high risk patients requiring additional follow-up.  No   Did the patient feel the follow up calls were helpful during their recovery period?  Yes   Was the number of calls appropriate?  Yes          Maddie Cabrera RN

## 2021-05-23 NOTE — PROGRESS NOTES
Subjective   Chief Complaint   Patient presents with   • Post-op Follow-up     Pt had CABG x3 on 3/19      Patient ID: Krys Cox is a 51 y.o. female who is here for follow-up having had Coronary artery bypass grafting-3 vessel (left internal mammary artery/left anterior descending, reverse saphenous vein graft/second obtuse marginal, and reverse saphenous vein graft/posterior descending artery), bilateral open vein harvests, Prevena Incision management system performed on 3/19/2021 by Dr. Thomas.    History of Present Illness  Post operative recovery was uneventful without any major complications.  She was admitted originally to Dr. Gavin's service with a diagnosis of NSTEMI and coronary artery disease.  Unfortunately she had poorly controlled diabetes mellitus at that time as well she was found also to smoke heavily.  She did convalesced nicely as an inpatient and has been following in our clinic to ensure postop acceptable recovery.  She has seen Dr. Gavin.  She has seen Ms. Toney.  She denies any fevers, sweats, or chills.  Pain control is good at this time.  No drainage from the sternotomy site.  She is sore but has no pain.  Her exercise stamina is improving.  No shortness of breath.  Is good to note she remains a non-smoker.      The following portions of the patient's history were reviewed and updated as appropriate: allergies, current medications, past family history, past medical history, past social history, past surgical history and problem list.    Review of Systems   Constitutional: Negative for chills, diaphoresis and fever.   Respiratory: Negative for shortness of breath and wheezing.    Cardiovascular: Positive for chest pain (incisional ) and leg swelling (improving). Negative for palpitations.   Gastrointestinal: Negative for constipation, diarrhea, nausea and vomiting.   Neurological: Negative for weakness.       Objective   Visit Vitals  /80 (BP Location: Left arm, Patient  "Position: Sitting, Cuff Size: Adult)   Pulse 87   Ht 157.5 cm (62.01\")   Wt 106 kg (233 lb 6.4 oz)   LMP 03/17/2020 (Approximate)   SpO2 93%   BMI 42.68 kg/m²       Physical Exam  Vitals reviewed.   Constitutional:       General: She is not in acute distress.     Appearance: She is well-developed. She is not diaphoretic.   HENT:      Head: Normocephalic.   Eyes:      Pupils: Pupils are equal, round, and reactive to light.   Neck:      Vascular: No JVD.   Cardiovascular:      Rate and Rhythm: Normal rate and regular rhythm.      Heart sounds: Normal heart sounds. No murmur heard.   No friction rub.   Pulmonary:      Effort: Pulmonary effort is normal. No respiratory distress.      Breath sounds: Normal breath sounds. No stridor. No wheezing or rales.   Abdominal:      General: There is no distension.      Palpations: Abdomen is soft.      Tenderness: There is no abdominal tenderness.   Musculoskeletal:      Cervical back: Normal range of motion and neck supple.      Right lower leg: Edema (minimal ) present.      Left lower leg: No edema.   Skin:     General: Skin is warm and dry.      Coloration: Skin is not pale.      Findings: No erythema or rash.      Comments: Sternotomy site clean and dry. Sternum stable. No clicks. Right saphenectomy site clean, dry and intact.  Left saphenectomy site clean and dry. No drainage and no evidence of cellulitis. Previous thermal injury to left neck healed.    Neurological:      Mental Status: She is alert and oriented to person, place, and time.      Cranial Nerves: No cranial nerve deficit.   Psychiatric:         Behavior: Behavior normal.         Assessment/Plan         Diagnoses and all orders for this visit:    1. NSTEMI (non-ST elevated myocardial infarction) (CMS/HCC) (Primary)  -     Ambulatory Referral to [facility]    2. Coronary artery disease involving native coronary artery of native heart with unstable angina pectoris (CMS/[facility])    3. Poorly controlled " diabetes mellitus (CMS/HCC)    4. Lung nodules  -     CT chest w contrast; Future    5. Former smoker         Overall, Krys Cox is doing well.  We discussed her sternotomy precautions and how those will evolve over the next 1 month.  Given her high risk status for sternal nonunion as well as sternal healing, I would like her to see Karina Platt in a month to reassess.  If her sternum is healing nicely at that time she will require follow-up with me in 1 year for identified perioperatively lung nodules at greatest size measuring 4 mm.  At this time she remains asymptomatic from those lung nodules.  I did discuss with her signs and/or symptoms which would suggest an earlier follow-up.   She has been cleared to begin cardiac rehabilitation and is excited do so.    We discussed the importance of ongoing risk factor modification including developing a routine and progressive exercise regimen, heart healthy diet, strict medical adherence, and a heart healthy lifestyle.  She is motivated at this time to improve her overall health.    Patient's Body mass index is 42.68 kg/m². BMI is above normal parameters. Recommendations include: educational material and referral to primary care.    We discussed the need to discontinue smoking as this reduces the overall graft patency.   I have congratulated Krys Cox on successful smoking cessation thus far. Krys Cox  reports that she quit smoking about 2 months ago. Her smoking use included cigarettes. She started smoking about 35 years ago. She has a 30.00 pack-year smoking history. She has never used smokeless tobacco. I have educated her on the risk of diseases from using tobacco products such as cancer, COPD and heart disease.      Advance Care Planning   ACP discussion was declined by the patient. n/a as patient is under 65 years of age.    All questions been answered the best my ability and she is agreeable to the aforementioned plan.

## 2021-06-02 ENCOUNTER — OFFICE VISIT (OUTPATIENT)
Dept: CARDIAC SURGERY | Facility: CLINIC | Age: 51
End: 2021-06-02

## 2021-06-02 VITALS
DIASTOLIC BLOOD PRESSURE: 84 MMHG | HEIGHT: 62 IN | OXYGEN SATURATION: 99 % | HEART RATE: 67 BPM | BODY MASS INDEX: 43.98 KG/M2 | SYSTOLIC BLOOD PRESSURE: 128 MMHG | WEIGHT: 239 LBS

## 2021-06-02 DIAGNOSIS — R91.8 LUNG NODULES: ICD-10-CM

## 2021-06-02 DIAGNOSIS — I21.4 NSTEMI (NON-ST ELEVATED MYOCARDIAL INFARCTION) (HCC): Primary | ICD-10-CM

## 2021-06-02 DIAGNOSIS — I25.110 CORONARY ARTERY DISEASE INVOLVING NATIVE CORONARY ARTERY OF NATIVE HEART WITH UNSTABLE ANGINA PECTORIS (HCC): ICD-10-CM

## 2021-06-02 DIAGNOSIS — E66.01 CLASS 3 SEVERE OBESITY DUE TO EXCESS CALORIES WITH SERIOUS COMORBIDITY AND BODY MASS INDEX (BMI) OF 40.0 TO 44.9 IN ADULT (HCC): ICD-10-CM

## 2021-06-02 DIAGNOSIS — E11.65 POORLY CONTROLLED DIABETES MELLITUS (HCC): ICD-10-CM

## 2021-06-02 DIAGNOSIS — Z87.891 FORMER SMOKER: ICD-10-CM

## 2021-06-02 PROCEDURE — 99024 POSTOP FOLLOW-UP VISIT: CPT | Performed by: NURSE PRACTITIONER

## 2021-06-02 RX ORDER — BUSPIRONE HYDROCHLORIDE 5 MG/1
5 TABLET ORAL 2 TIMES DAILY
COMMUNITY
Start: 2021-05-21

## 2021-06-02 RX ORDER — METFORMIN HYDROCHLORIDE 500 MG/1
1000 TABLET, EXTENDED RELEASE ORAL 2 TIMES DAILY
COMMUNITY
Start: 2021-05-21 | End: 2022-02-01

## 2022-02-01 ENCOUNTER — HOSPITAL ENCOUNTER (OUTPATIENT)
Facility: HOSPITAL | Age: 52
Setting detail: OBSERVATION
Discharge: HOME | End: 2022-02-03
Attending: EMERGENCY MEDICINE | Admitting: INTERNAL MEDICINE

## 2022-02-01 ENCOUNTER — APPOINTMENT (OUTPATIENT)
Dept: CT IMAGING | Facility: HOSPITAL | Age: 52
End: 2022-02-01

## 2022-02-01 ENCOUNTER — APPOINTMENT (OUTPATIENT)
Dept: GENERAL RADIOLOGY | Facility: HOSPITAL | Age: 52
End: 2022-02-01

## 2022-02-01 DIAGNOSIS — U07.1 COVID: ICD-10-CM

## 2022-02-01 DIAGNOSIS — J81.0 ACUTE PULMONARY EDEMA: Primary | ICD-10-CM

## 2022-02-01 DIAGNOSIS — I50.9 ACUTE CONGESTIVE HEART FAILURE, UNSPECIFIED HEART FAILURE TYPE: ICD-10-CM

## 2022-02-01 LAB
ALBUMIN SERPL-MCNC: 3.9 G/DL (ref 3.5–5.2)
ALBUMIN/GLOB SERPL: 1.1 G/DL
ALP SERPL-CCNC: 121 U/L (ref 39–117)
ALT SERPL W P-5'-P-CCNC: 23 U/L (ref 1–33)
ANION GAP SERPL CALCULATED.3IONS-SCNC: 11 MMOL/L (ref 5–15)
APTT PPP: 28.6 SECONDS (ref 24.1–35)
AST SERPL-CCNC: 22 U/L (ref 1–32)
BASOPHILS # BLD AUTO: 0.05 10*3/MM3 (ref 0–0.2)
BASOPHILS NFR BLD AUTO: 0.5 % (ref 0–1.5)
BILIRUB SERPL-MCNC: 0.4 MG/DL (ref 0–1.2)
BUN SERPL-MCNC: 21 MG/DL (ref 6–20)
BUN/CREAT SERPL: 25.9 (ref 7–25)
CALCIUM SPEC-SCNC: 9.2 MG/DL (ref 8.6–10.5)
CHLORIDE SERPL-SCNC: 105 MMOL/L (ref 98–107)
CHOLEST SERPL-MCNC: 120 MG/DL (ref 0–200)
CO2 SERPL-SCNC: 27 MMOL/L (ref 22–29)
CREAT SERPL-MCNC: 0.81 MG/DL (ref 0.57–1)
CRP SERPL-MCNC: 0.44 MG/DL (ref 0–0.5)
D DIMER PPP FEU-MCNC: 0.78 MG/L (FEU) (ref 0–0.5)
DEPRECATED RDW RBC AUTO: 44.4 FL (ref 37–54)
EOSINOPHIL # BLD AUTO: 0.31 10*3/MM3 (ref 0–0.4)
EOSINOPHIL NFR BLD AUTO: 3.4 % (ref 0.3–6.2)
ERYTHROCYTE [DISTWIDTH] IN BLOOD BY AUTOMATED COUNT: 15.3 % (ref 12.3–15.4)
FERRITIN SERPL-MCNC: 131.7 NG/ML (ref 13–150)
GFR SERPL CREATININE-BSD FRML MDRD: 75 ML/MIN/1.73
GLOBULIN UR ELPH-MCNC: 3.7 GM/DL
GLUCOSE SERPL-MCNC: 72 MG/DL (ref 65–99)
HBA1C MFR BLD: 8.1 % (ref 4.8–5.6)
HCT VFR BLD AUTO: 41.3 % (ref 34–46.6)
HDLC SERPL-MCNC: 46 MG/DL (ref 40–60)
HGB BLD-MCNC: 12.7 G/DL (ref 12–15.9)
IMM GRANULOCYTES # BLD AUTO: 0.03 10*3/MM3 (ref 0–0.05)
IMM GRANULOCYTES NFR BLD AUTO: 0.3 % (ref 0–0.5)
INR PPP: 0.96 (ref 0.91–1.09)
LDLC SERPL CALC-MCNC: 46 MG/DL (ref 0–100)
LDLC/HDLC SERPL: 0.86 {RATIO}
LYMPHOCYTES # BLD AUTO: 2.5 10*3/MM3 (ref 0.7–3.1)
LYMPHOCYTES NFR BLD AUTO: 27.1 % (ref 19.6–45.3)
MCH RBC QN AUTO: 24.4 PG (ref 26.6–33)
MCHC RBC AUTO-ENTMCNC: 30.8 G/DL (ref 31.5–35.7)
MCV RBC AUTO: 79.4 FL (ref 79–97)
MONOCYTES # BLD AUTO: 0.69 10*3/MM3 (ref 0.1–0.9)
MONOCYTES NFR BLD AUTO: 7.5 % (ref 5–12)
NEUTROPHILS NFR BLD AUTO: 5.66 10*3/MM3 (ref 1.7–7)
NEUTROPHILS NFR BLD AUTO: 61.2 % (ref 42.7–76)
NRBC BLD AUTO-RTO: 0 /100 WBC (ref 0–0.2)
NT-PROBNP SERPL-MCNC: 2238 PG/ML (ref 0–900)
PLATELET # BLD AUTO: 450 10*3/MM3 (ref 140–450)
PMV BLD AUTO: 10 FL (ref 6–12)
POTASSIUM SERPL-SCNC: 3.7 MMOL/L (ref 3.5–5.2)
PROCALCITONIN SERPL-MCNC: <0.02 NG/ML (ref 0–0.25)
PROT SERPL-MCNC: 7.6 G/DL (ref 6–8.5)
PROTHROMBIN TIME: 12.4 SECONDS (ref 11.9–14.6)
RBC # BLD AUTO: 5.2 10*6/MM3 (ref 3.77–5.28)
SARS-COV-2 RNA PNL SPEC NAA+PROBE: DETECTED
SODIUM SERPL-SCNC: 143 MMOL/L (ref 136–145)
TRIGL SERPL-MCNC: 172 MG/DL (ref 0–150)
TROPONIN T SERPL-MCNC: <0.01 NG/ML (ref 0–0.03)
TSH SERPL DL<=0.05 MIU/L-ACNC: 2.87 UIU/ML (ref 0.27–4.2)
VLDLC SERPL-MCNC: 28 MG/DL (ref 5–40)
WBC NRBC COR # BLD: 9.24 10*3/MM3 (ref 3.4–10.8)

## 2022-02-01 PROCEDURE — 94799 UNLISTED PULMONARY SVC/PX: CPT

## 2022-02-01 PROCEDURE — 96374 THER/PROPH/DIAG INJ IV PUSH: CPT

## 2022-02-01 PROCEDURE — 71045 X-RAY EXAM CHEST 1 VIEW: CPT

## 2022-02-01 PROCEDURE — 93005 ELECTROCARDIOGRAM TRACING: CPT | Performed by: EMERGENCY MEDICINE

## 2022-02-01 PROCEDURE — 99284 EMERGENCY DEPT VISIT MOD MDM: CPT

## 2022-02-01 PROCEDURE — 83036 HEMOGLOBIN GLYCOSYLATED A1C: CPT | Performed by: INTERNAL MEDICINE

## 2022-02-01 PROCEDURE — 82728 ASSAY OF FERRITIN: CPT | Performed by: INTERNAL MEDICINE

## 2022-02-01 PROCEDURE — C9803 HOPD COVID-19 SPEC COLLECT: HCPCS

## 2022-02-01 PROCEDURE — 0 IOPAMIDOL PER 1 ML: Performed by: INTERNAL MEDICINE

## 2022-02-01 PROCEDURE — G0378 HOSPITAL OBSERVATION PER HR: HCPCS

## 2022-02-01 PROCEDURE — 83880 ASSAY OF NATRIURETIC PEPTIDE: CPT | Performed by: EMERGENCY MEDICINE

## 2022-02-01 PROCEDURE — 71275 CT ANGIOGRAPHY CHEST: CPT

## 2022-02-01 PROCEDURE — 80050 GENERAL HEALTH PANEL: CPT | Performed by: EMERGENCY MEDICINE

## 2022-02-01 PROCEDURE — 84145 PROCALCITONIN (PCT): CPT | Performed by: INTERNAL MEDICINE

## 2022-02-01 PROCEDURE — 25010000002 FUROSEMIDE PER 20 MG: Performed by: EMERGENCY MEDICINE

## 2022-02-01 PROCEDURE — 93010 ELECTROCARDIOGRAM REPORT: CPT | Performed by: INTERNAL MEDICINE

## 2022-02-01 PROCEDURE — 96372 THER/PROPH/DIAG INJ SC/IM: CPT

## 2022-02-01 PROCEDURE — 85610 PROTHROMBIN TIME: CPT | Performed by: EMERGENCY MEDICINE

## 2022-02-01 PROCEDURE — 80061 LIPID PANEL: CPT | Performed by: INTERNAL MEDICINE

## 2022-02-01 PROCEDURE — 85730 THROMBOPLASTIN TIME PARTIAL: CPT | Performed by: EMERGENCY MEDICINE

## 2022-02-01 PROCEDURE — 86140 C-REACTIVE PROTEIN: CPT | Performed by: INTERNAL MEDICINE

## 2022-02-01 PROCEDURE — 84484 ASSAY OF TROPONIN QUANT: CPT | Performed by: EMERGENCY MEDICINE

## 2022-02-01 PROCEDURE — 25010000002 ENOXAPARIN PER 10 MG: Performed by: INTERNAL MEDICINE

## 2022-02-01 PROCEDURE — 87635 SARS-COV-2 COVID-19 AMP PRB: CPT | Performed by: EMERGENCY MEDICINE

## 2022-02-01 PROCEDURE — 85379 FIBRIN DEGRADATION QUANT: CPT | Performed by: EMERGENCY MEDICINE

## 2022-02-01 RX ORDER — FUROSEMIDE 10 MG/ML
80 INJECTION INTRAMUSCULAR; INTRAVENOUS ONCE
Status: COMPLETED | OUTPATIENT
Start: 2022-02-01 | End: 2022-02-01

## 2022-02-01 RX ORDER — ACETAMINOPHEN 650 MG/1
650 SUPPOSITORY RECTAL EVERY 4 HOURS PRN
Status: DISCONTINUED | OUTPATIENT
Start: 2022-02-01 | End: 2022-02-03 | Stop reason: HOSPADM

## 2022-02-01 RX ORDER — ASPIRIN 81 MG/1
81 TABLET, CHEWABLE ORAL DAILY
Status: DISCONTINUED | OUTPATIENT
Start: 2022-02-01 | End: 2022-02-01

## 2022-02-01 RX ORDER — MINOXIDIL 10 MG/1
10 TABLET ORAL 2 TIMES DAILY
COMMUNITY
End: 2022-02-03 | Stop reason: HOSPADM

## 2022-02-01 RX ORDER — NICOTINE POLACRILEX 4 MG
15 LOZENGE BUCCAL
Status: DISCONTINUED | OUTPATIENT
Start: 2022-02-01 | End: 2022-02-01 | Stop reason: SDUPTHER

## 2022-02-01 RX ORDER — PANTOPRAZOLE SODIUM 40 MG/1
40 TABLET, DELAYED RELEASE ORAL DAILY
Status: DISCONTINUED | OUTPATIENT
Start: 2022-02-01 | End: 2022-02-03 | Stop reason: HOSPADM

## 2022-02-01 RX ORDER — ACETAMINOPHEN 160 MG/5ML
650 SOLUTION ORAL EVERY 4 HOURS PRN
Status: DISCONTINUED | OUTPATIENT
Start: 2022-02-01 | End: 2022-02-03 | Stop reason: HOSPADM

## 2022-02-01 RX ORDER — SERTRALINE HYDROCHLORIDE 100 MG/1
100 TABLET, FILM COATED ORAL DAILY
Status: DISCONTINUED | OUTPATIENT
Start: 2022-02-02 | End: 2022-02-03 | Stop reason: HOSPADM

## 2022-02-01 RX ORDER — SODIUM CHLORIDE 0.9 % (FLUSH) 0.9 %
10 SYRINGE (ML) INJECTION AS NEEDED
Status: DISCONTINUED | OUTPATIENT
Start: 2022-02-01 | End: 2022-02-03 | Stop reason: HOSPADM

## 2022-02-01 RX ORDER — ASCORBIC ACID 500 MG
500 TABLET ORAL DAILY
Status: DISCONTINUED | OUTPATIENT
Start: 2022-02-01 | End: 2022-02-03 | Stop reason: HOSPADM

## 2022-02-01 RX ORDER — NICOTINE POLACRILEX 4 MG
15 LOZENGE BUCCAL
Status: DISCONTINUED | OUTPATIENT
Start: 2022-02-01 | End: 2022-02-03 | Stop reason: HOSPADM

## 2022-02-01 RX ORDER — ZINC SULFATE 50(220)MG
220 CAPSULE ORAL DAILY
Status: DISCONTINUED | OUTPATIENT
Start: 2022-02-01 | End: 2022-02-03 | Stop reason: HOSPADM

## 2022-02-01 RX ORDER — ASPIRIN 81 MG/1
81 TABLET, CHEWABLE ORAL NIGHTLY
Status: DISCONTINUED | OUTPATIENT
Start: 2022-02-01 | End: 2022-02-03 | Stop reason: HOSPADM

## 2022-02-01 RX ORDER — DEXTROSE MONOHYDRATE 25 G/50ML
25 INJECTION, SOLUTION INTRAVENOUS
Status: DISCONTINUED | OUTPATIENT
Start: 2022-02-01 | End: 2022-02-01 | Stop reason: SDUPTHER

## 2022-02-01 RX ORDER — ONDANSETRON 2 MG/ML
4 INJECTION INTRAMUSCULAR; INTRAVENOUS EVERY 6 HOURS PRN
Status: DISCONTINUED | OUTPATIENT
Start: 2022-02-01 | End: 2022-02-03 | Stop reason: HOSPADM

## 2022-02-01 RX ORDER — FUROSEMIDE 40 MG/1
40 TABLET ORAL DAILY
Status: ON HOLD | COMMUNITY
End: 2022-02-03 | Stop reason: SDUPTHER

## 2022-02-01 RX ORDER — BUSPIRONE HYDROCHLORIDE 5 MG/1
5 TABLET ORAL 2 TIMES DAILY
Status: DISCONTINUED | OUTPATIENT
Start: 2022-02-01 | End: 2022-02-03 | Stop reason: HOSPADM

## 2022-02-01 RX ORDER — CLOPIDOGREL BISULFATE 75 MG/1
75 TABLET ORAL NIGHTLY
Status: DISCONTINUED | OUTPATIENT
Start: 2022-02-02 | End: 2022-02-01

## 2022-02-01 RX ORDER — SODIUM CHLORIDE 0.9 % (FLUSH) 0.9 %
10 SYRINGE (ML) INJECTION EVERY 12 HOURS SCHEDULED
Status: DISCONTINUED | OUTPATIENT
Start: 2022-02-01 | End: 2022-02-03 | Stop reason: HOSPADM

## 2022-02-01 RX ORDER — BENZONATATE 100 MG/1
100 CAPSULE ORAL 3 TIMES DAILY PRN
Status: DISCONTINUED | OUTPATIENT
Start: 2022-02-01 | End: 2022-02-03 | Stop reason: HOSPADM

## 2022-02-01 RX ORDER — LANOLIN ALCOHOL/MO/W.PET/CERES
6 CREAM (GRAM) TOPICAL NIGHTLY PRN
Status: DISCONTINUED | OUTPATIENT
Start: 2022-02-01 | End: 2022-02-03 | Stop reason: HOSPADM

## 2022-02-01 RX ORDER — CLOPIDOGREL BISULFATE 75 MG/1
75 TABLET ORAL NIGHTLY
Status: DISCONTINUED | OUTPATIENT
Start: 2022-02-01 | End: 2022-02-03 | Stop reason: HOSPADM

## 2022-02-01 RX ORDER — LISINOPRIL 5 MG/1
5 TABLET ORAL DAILY
Status: DISCONTINUED | OUTPATIENT
Start: 2022-02-02 | End: 2022-02-03 | Stop reason: HOSPADM

## 2022-02-01 RX ORDER — LEVOTHYROXINE SODIUM 0.15 MG/1
150 TABLET ORAL
Status: DISCONTINUED | OUTPATIENT
Start: 2022-02-02 | End: 2022-02-03 | Stop reason: HOSPADM

## 2022-02-01 RX ORDER — CLOPIDOGREL BISULFATE 75 MG/1
75 TABLET ORAL NIGHTLY
COMMUNITY
End: 2022-03-29

## 2022-02-01 RX ORDER — ATORVASTATIN CALCIUM 80 MG/1
80 TABLET, FILM COATED ORAL NIGHTLY
COMMUNITY

## 2022-02-01 RX ORDER — CLOPIDOGREL BISULFATE 75 MG/1
75 TABLET ORAL DAILY
Status: DISCONTINUED | OUTPATIENT
Start: 2022-02-01 | End: 2022-02-01

## 2022-02-01 RX ORDER — LISINOPRIL 20 MG/1
20 TABLET ORAL 2 TIMES DAILY
COMMUNITY

## 2022-02-01 RX ORDER — FUROSEMIDE 10 MG/ML
40 INJECTION INTRAMUSCULAR; INTRAVENOUS EVERY 12 HOURS
Status: DISCONTINUED | OUTPATIENT
Start: 2022-02-02 | End: 2022-02-03 | Stop reason: HOSPADM

## 2022-02-01 RX ORDER — ASPIRIN 81 MG/1
81 TABLET, CHEWABLE ORAL NIGHTLY
Status: DISCONTINUED | OUTPATIENT
Start: 2022-02-02 | End: 2022-02-01

## 2022-02-01 RX ORDER — ACETAMINOPHEN 325 MG/1
650 TABLET ORAL EVERY 4 HOURS PRN
Status: DISCONTINUED | OUTPATIENT
Start: 2022-02-01 | End: 2022-02-03 | Stop reason: HOSPADM

## 2022-02-01 RX ORDER — DEXTROSE MONOHYDRATE 25 G/50ML
25 INJECTION, SOLUTION INTRAVENOUS
Status: DISCONTINUED | OUTPATIENT
Start: 2022-02-01 | End: 2022-02-03 | Stop reason: HOSPADM

## 2022-02-01 RX ORDER — NITROGLYCERIN 0.4 MG/1
0.4 TABLET SUBLINGUAL
Status: DISCONTINUED | OUTPATIENT
Start: 2022-02-01 | End: 2022-02-03 | Stop reason: HOSPADM

## 2022-02-01 RX ORDER — ALBUTEROL SULFATE 90 UG/1
2 AEROSOL, METERED RESPIRATORY (INHALATION) EVERY 6 HOURS PRN
Status: DISCONTINUED | OUTPATIENT
Start: 2022-02-01 | End: 2022-02-03 | Stop reason: HOSPADM

## 2022-02-01 RX ORDER — ATORVASTATIN CALCIUM 40 MG/1
80 TABLET, FILM COATED ORAL NIGHTLY
Status: DISCONTINUED | OUTPATIENT
Start: 2022-02-01 | End: 2022-02-03 | Stop reason: HOSPADM

## 2022-02-01 RX ADMIN — ZINC SULFATE 220 MG (50 MG) CAPSULE 220 MG: CAPSULE at 22:02

## 2022-02-01 RX ADMIN — METOPROLOL TARTRATE 25 MG: 25 TABLET, FILM COATED ORAL at 22:04

## 2022-02-01 RX ADMIN — ASPIRIN 81 MG: 81 TABLET, CHEWABLE ORAL at 22:08

## 2022-02-01 RX ADMIN — ENOXAPARIN SODIUM 40 MG: 40 INJECTION SUBCUTANEOUS at 22:02

## 2022-02-01 RX ADMIN — CLOPIDOGREL 75 MG: 75 TABLET, FILM COATED ORAL at 22:08

## 2022-02-01 RX ADMIN — SODIUM CHLORIDE, PRESERVATIVE FREE 10 ML: 5 INJECTION INTRAVENOUS at 22:41

## 2022-02-01 RX ADMIN — ATORVASTATIN CALCIUM 80 MG: 40 TABLET, FILM COATED ORAL at 22:04

## 2022-02-01 RX ADMIN — IOPAMIDOL 81 ML: 755 INJECTION, SOLUTION INTRAVENOUS at 18:40

## 2022-02-01 RX ADMIN — PANTOPRAZOLE SODIUM 40 MG: 40 TABLET, DELAYED RELEASE ORAL at 22:02

## 2022-02-01 RX ADMIN — BUSPIRONE HYDROCHLORIDE 5 MG: 5 TABLET ORAL at 22:04

## 2022-02-01 RX ADMIN — FUROSEMIDE 80 MG: 10 INJECTION, SOLUTION INTRAVENOUS at 15:06

## 2022-02-01 NOTE — ED PROVIDER NOTES
Subjective   Patient is a 32-year-old who came the ER complain of shortness of breath cough.  No other complaint associate with this reason gained 8 kg over the last 2 weeks.      Shortness of Breath  Severity:  Moderate  Onset quality:  Gradual  Timing:  Constant  Progression:  Worsening  Chronicity:  New  Context: activity    Context: not animal exposure, not emotional upset, not fumes, not known allergens, not occupational exposure, not pollens and not URI    Relieved by:  Nothing  Worsened by:  Exertion  Ineffective treatments:  Diuretics  Associated symptoms: cough, PND and wheezing    Associated symptoms: no abdominal pain, no chest pain, no claudication, no diaphoresis, no ear pain, no fever, no headaches, no neck pain, no rash, no sore throat, no sputum production, no syncope, no swollen glands and no vomiting    Risk factors: obesity    Risk factors: no recent alcohol use, no family hx of DVT, no hx of PE/DVT and no tobacco use    Leg Swelling  Associated symptoms: cough, shortness of breath and wheezing    Associated symptoms: no abdominal pain, no chest pain, no congestion, no diarrhea, no ear pain, no fatigue, no fever, no headaches, no myalgias, no nausea, no rash, no sore throat and no vomiting        Review of Systems   Constitutional: Negative.  Negative for activity change, appetite change, chills, diaphoresis, fatigue and fever.   HENT: Negative for congestion, drooling, ear pain, facial swelling, hearing loss, sinus pressure and sore throat.    Eyes: Negative.  Negative for discharge.   Respiratory: Positive for cough, shortness of breath and wheezing. Negative for sputum production.    Cardiovascular: Positive for PND. Negative for chest pain, claudication and syncope.   Gastrointestinal: Negative for abdominal distention, abdominal pain, blood in stool, diarrhea, nausea and vomiting.   Endocrine: Negative.  Negative for cold intolerance, heat intolerance, polydipsia, polyphagia and polyuria.    Genitourinary: Negative.  Negative for dysuria, flank pain and urgency.   Musculoskeletal: Negative.  Negative for arthralgias, back pain, myalgias, neck pain and neck stiffness.   Skin: Negative.  Negative for color change, pallor and rash.   Allergic/Immunologic: Negative.    Neurological: Negative.  Negative for dizziness, seizures, speech difficulty, weakness, numbness and headaches.   Hematological: Negative.  Negative for adenopathy.   All other systems reviewed and are negative.      Past Medical History:   Diagnosis Date   • Arthritis    • CHF (congestive heart failure) (HCC)    • Diabetes mellitus (HCC)    • Disease of thyroid gland    • Elevated cholesterol    • Hypertension        No Known Allergies    Past Surgical History:   Procedure Laterality Date   • CARDIAC CATHETERIZATION     •  SECTION      she has had 4   • CORONARY ARTERY BYPASS GRAFT N/A 3/19/2021    Procedure: CORONARY ARTERY BYPASS GRAFT X 3 WITH LEFT INTERNAL MAMMARY ARTERY, BILATERAL LOWER EXTREMITY OPEN VEIN HARVEST, AND PERFUSION; APPLICATION OF PREVENA INCISIONAL WOUND VAC X 3; TRANSESOPHAGEAL ECHOCARDIOGRAM;  Surgeon: Vicente Thomas MD;  Location: [facility];  Service: Cardiothoracic;  Laterality: N/A;   • DILATATION AND CURETTAGE     • TUBAL ABDOMINAL LIGATION         History reviewed. No pertinent family history.    Social History     Socioeconomic History   • Marital status:    Tobacco Use   • Smoking status: Former Smoker     Packs/day: 1.00     Years: 30.00     Pack years: 30.00     Types: Cigarettes     Start date: 3/17/1986     Quit date: 3/12/2021     Years since quittin.8   • Smokeless tobacco: Never Used   • Tobacco comment: quit 2 weeks ago when hospitalized    Vaping Use   • Vaping Use: Never used   Substance and Sexual Activity   • Alcohol use: Not Currently   • Drug use: Never   • Sexual activity: Defer           Objective   Physical Exam  Vitals and nursing note reviewed. Exam conducted with a  chaperone present.   Constitutional:       General: She is not in acute distress.     Appearance: Normal appearance. She is well-developed. She is ill-appearing. She is not toxic-appearing or diaphoretic.   HENT:      Head: Normocephalic and atraumatic.      Right Ear: External ear normal.      Nose: Nose normal.      Mouth/Throat:      Mouth: Mucous membranes are moist.   Eyes:      Conjunctiva/sclera: Conjunctivae normal.      Pupils: Pupils are equal, round, and reactive to light.   Cardiovascular:      Rate and Rhythm: Normal rate and regular rhythm.      Chest Wall: PMI is not displaced.      Pulses: Normal pulses. No decreased pulses.      Heart sounds: No murmur heard.   No systolic murmur is present.   No diastolic murmur is present.  Gallop present. S3 sounds present.    Pulmonary:      Effort: Pulmonary effort is normal. No tachypnea, accessory muscle usage or respiratory distress.      Breath sounds: No stridor. Examination of the right-middle field reveals rales. Examination of the left-middle field reveals rales. Examination of the right-lower field reveals rales. Examination of the left-lower field reveals rales. Rales present. No decreased breath sounds, wheezing or rhonchi.   Chest:      Chest wall: No tenderness or crepitus.   Abdominal:      General: Bowel sounds are normal. There is no distension.      Palpations: Abdomen is soft.      Tenderness: There is no abdominal tenderness.   Musculoskeletal:         General: No swelling or tenderness. Normal range of motion.      Cervical back: Normal range of motion and neck supple. No rigidity.      Right lower le+ Edema present.      Left lower le+ Edema present.      Comments: Lower extremity exam bilaterally is unremarkable.  There is no right or left calf tenderness .  There is no palpable venous cord.  No obvious difference in the size of the legs.  No pitting edema.  The dorsalis pedis and posterior tibial femoral and popliteal pulses are  palpable and +2 bilaterally.  Homans sign is negative   Skin:     General: Skin is warm.      Capillary Refill: Capillary refill takes less than 2 seconds.      Coloration: Skin is not jaundiced.      Findings: No erythema or rash.   Neurological:      General: No focal deficit present.      Mental Status: She is alert and oriented to person, place, and time. Mental status is at baseline.      Cranial Nerves: No cranial nerve deficit.      Motor: No weakness.      Coordination: Coordination normal.      Deep Tendon Reflexes: Reflexes are normal and symmetric. Reflexes normal.   Psychiatric:         Mood and Affect: Mood normal.         Procedures           ED Course  ED Course as of 02/01/22 1537   Tue Feb 01, 2022   1316 · Left ventricular ejection fraction appears to be 56 - 60%. Left ventricular systolic function is normal.  · Left ventricular diastolic function is consistent with (grade I) impaired relaxation.  · Left atrial volume is severely increased.  · Estimated right ventricular systolic pressure from tricuspid regurgitation is normal (<35 mmHg).    Patient states that she got history of CHF [TS]   1510 Years criteria PE excluded  [TS]   1534 Patient was seen by the primary MD about 7 or 8 days ago placed on diuretics blood she is still short of breath and has gained approximately 14 pounds.  Patient is still short of breath or dyspnea on exertion no chest pain she has got elevated BNP she does have a history of CHF with preserved ejection fraction we will give her Lasix in the ED I discussed this case with the medicine service she has not gotten better with outpatient treatment will be admitted for IV diuretics she is also Covid positive. [TS]      ED Course User Index  [TS] Mohit Delgado MD                                                 MDM  Number of Diagnoses or Management Options  Diagnosis management comments: Differential Diagnosis:  I considered pulmonary etiology, asthma, chronic obstructive  pulmonary disease, pneumonia, pulmonary embolism, adult respiratory distress syndrome, pneumothorax, pleural effusion, pulmonary fibrosis, cardiac etiology, congestive heart failure, myocardial infarction, metabolic etiology, diabetic ketoacidosis, uremia, acidosis, sepsis, anemia, drug related etiology, hyperventilation and CNS disease as a possible cause of dyspnea in this patient. This is a partial list of diagnoses considered.            Amount and/or Complexity of Data Reviewed  Clinical lab tests: reviewed and ordered  Tests in the radiology section of CPT®: ordered and reviewed  Tests in the medicine section of CPT®: ordered and reviewed    Risk of Complications, Morbidity, and/or Mortality  Presenting problems: moderate  Diagnostic procedures: moderate  Management options: moderate        Final diagnoses:   Acute pulmonary edema (HCC)   Acute congestive heart failure, unspecified heart failure type (HCC)   COVID       ED Disposition  ED Disposition     ED Disposition Condition Comment    Decision to Admit  Level of Care: Telemetry [5]   Diagnosis: Acute pulmonary edema (HCC) [[id number]]   Admitting Physician: SINDY HARTMAN [[id number]]   Attending Physician: SINDY HARTMAN [[id number]]            No follow-up provider specified.       Medication List      No changes were made to your prescriptions during this visit.          Mohit Delgado MD  02/01/22 [id number]

## 2022-02-01 NOTE — ED NOTES
Ambulated patient around the room, oxygen ranged from 91-97%     Oscar Guthrie, RN  02/01/22 7756

## 2022-02-01 NOTE — H&P
[facility] Medicine Services  HISTORY AND PHYSICAL    Date of Admission: 2/1/2022  Primary Care Physician: Karolina Toney APRN    Subjective     Chief Complaint: sob  History of Present Illness  I am asked admit the patient for CHF exacerbation.  Patient also tested positive for COVID-19 although oxygen saturation 91 to 97% as she was ambulated in the room.  Patient initial complaint of the emergency room was shortness of breath and leg swelling.  Blood pressure was 191/80.  She was afebrile  Per discussion with Dr. Delgado, she had stopped her Lasix for a while and gained 14 pounds.    Review of diagnostic studies showed normal sodium potassium CO2  Troponin T is less than 0.010  proBNP 2238  D-dimer 0.78  Normal white count of 9.24 with predominance of neutrophils at 61  Hemoglobin 12.7 MCV not 79.  Chest x-ray reported as cardiomegaly and bilateral interstitial and airspace opacity favoring moderate pulmonary edema but infectious process also within differential.        Results for orders placed during the hospital encounter of 03/17/21    Intra-Op TAVR Transesophageal Echo    Interpretation Summary  · Left ventricular ejection fraction appears to be 56 - 60%. Left ventricular systolic function is normal.  · Postop no free air      She has history of coronary artery bypass graft x3 on March 19, 2021      Her other medical problems as listed from this hospitalization includes hypertension, diabetes, morbid obesity, tobacco abuse  Osteoarthritis, history of peptic ulcer disease and lung nodules      Sob  increasing swelling  Wakes up sob x couple of weeks  Similar symptoms when she had CHF [id number]  New rx since Jan 24. Minoxidil and Lasix - been taking these regularly    DM - Has insulin pump    HTN, HLD, Thyroid disorder    Review of Systems   No fever, chill  + cough all the time. Tobacco abuse in remission  int wheezing  No diarrhea  No abdominal  No n/v  No fall  No loc,  "syncope  Smell issue is hereditary to her  No loss of taste  Stays tired all the time      Otherwise complete ROS reviewed and negative except as mentioned in the HPI.    Past Medical History:   Past Medical History:   Diagnosis Date   • Arthritis    • CHF (congestive heart failure) (HCC)    • Diabetes mellitus (HCC)    • Disease of thyroid gland    • Elevated cholesterol    • Hypertension      Past Surgical History:  Past Surgical History:   Procedure Laterality Date   • CARDIAC CATHETERIZATION     •  SECTION      she has had 4   • CORONARY ARTERY BYPASS GRAFT N/A 3/19/2021    Procedure: CORONARY ARTERY BYPASS GRAFT X 3 WITH LEFT INTERNAL MAMMARY ARTERY, BILATERAL LOWER EXTREMITY OPEN VEIN HARVEST, AND PERFUSION; APPLICATION OF PREVENA INCISIONAL WOUND VAC X 3; TRANSESOPHAGEAL ECHOCARDIOGRAM;  Surgeon: Vicente Thomas MD;  Location: [facility] OR;  Service: Cardiothoracic;  Laterality: N/A;   • DILATATION AND CURETTAGE     • TUBAL ABDOMINAL LIGATION       Social History:  reports that she quit smoking about 10 months ago. Her smoking use included cigarettes. She started smoking about 35 years ago. She has a 30.00 pack-year smoking history. She has never used smokeless tobacco. She reports previous alcohol use. She reports that she does not use drugs.    Family History:  Mom passed age 30 \"blood clot; breast and lung cancer runs in family      Allergies:  No Known Allergies    Medications:    Lasix and minoxidil recently added since   Prior to Admission medications    Medication Sig Start Date End Date Taking? Authorizing Provider   aspirin 81 MG chewable tablet Chew 81 mg Daily.    ProviderAngelica MD   atorvastatin (LIPITOR) 40 MG tablet Take 1 tablet by mouth Every Night.  Patient taking differently: Take 80 mg by mouth Every Night. 3/24/21   Vicente Thomas MD   busPIRone (BUSPAR) 5 MG tablet Take 5 mg by mouth 2 (Two) Times a Day. 21   ProviderAngelica MD   clopidogrel (PLAVIX) 75 " "MG tablet Take 1 tablet by mouth Daily. 3/24/21   Vicente Thomas MD   insulin lispro (humaLOG) 100 UNIT/ML injection Inject 1-6 Units under the skin into the appropriate area as directed 3 (Three) Times a Day. 1-6 units three times a day as needed    ProviderAngelica MD   levothyroxine (SYNTHROID, LEVOTHROID) 150 MCG tablet Take 150 mcg by mouth Daily.    ProviderAngelica MD   lisinopril (PRINIVIL,ZESTRIL)20 MG tablet Take 1 tablet by mouth Daily. 3/24/21   Vicente Thomas MD   metoprolol tartrate (LOPRESSOR) 25 MG tablet Take 1 tablet by mouth 2 (Two) Times a Day. 3/24/21   Vicente Thomas MD   sertraline (ZOLOFT) 100 MG tablet Take 100 mg by mouth Daily.    ProviderAngelica MD   vitamin D3 125 MCG (5000 UT) capsule capsule Take 10,000 Units by mouth Daily.    ProviderAngelica MD   silver sulfadiazine (SILVADENE, SSD) 1 % cream Apply  topically to the appropriate area as directed 2 (Two) Times a Day. Apply to left neck affected area 3/24/21 2/1/22  Vicente Thomas MD     I have utilized all available immediate resources to obtain, update, and review the patient's current medications.    Objective     Vital Signs: /82 (BP Location: Left arm, Patient Position: Sitting)   Pulse 85   Temp 98 °F (36.7 °C) (Oral)   Resp (!) 31   Ht 157.5 cm (62\")   Wt 122 kg (268 lb)   LMP 03/17/2020 (Approximate)   SpO2 97%   BMI 49.02 kg/m²   Physical Exam  Morbidly obese  Pleasant woman  No distress  Elevated blood pressure noted  O2 saturation 100% in room air  No accessory muscle use  Nonlabored breathing at time of examination  Speaks in full sentences  GEN: Awake, alert, interactive, in NAD  HEENT: Atraumatic, PERRLA, EOMI, Anicteric, Trachea midline  Lungs: CTAB, no wheezing/rales/rhonchi  Heart: RRR, +S1/s2, no rub  ABD: soft, nt/nd, +BS, no guarding/rebound  Extremities: atraumatic, no cyanosis, positive pitting edema bilateral lower extremities  Skin: no rashes or lesions  Neuro: " AAOx3, no focal deficits  Psych: normal mood & affect        Results Reviewed:  Lab Results (last 24 hours)     Procedure Component Value Units Date/Time    COVID PRE-OP / PRE-PROCEDURE SCREENING ORDER (NO ISOLATION) - Swab, Nasal Cavity [[id number]]  (Abnormal) Collected: 02/01/22 1345    Specimen: Swab from Nasal Cavity Updated: 02/01/22 1445    Narrative:      The following orders were created for panel order COVID PRE-OP / PRE-PROCEDURE SCREENING ORDER (NO ISOLATION) - Swab, Nasal Cavity.  Procedure                               Abnormality         Status                     ---------                               -----------         ------                     COVID-19,[facility] [employer] IN-NATHALIE...[[id number]]  Abnormal            Final result                 Please view results for these tests on the individual orders.    COVID-19,[facility] [employer] IN-HOUSE,Nasal Swab No Transport Media 3-4 HR TAT - Swab, Nasal Cavity [[id number]]  (Abnormal) Collected: 02/01/22 1345    Specimen: Swab from Nasal Cavity Updated: 02/01/22 1445     COVID19 Detected    Narrative:      Fact sheet for providers: https://www.fda.gov/media/740741/download     Fact sheet for patients: https://www.fda.gov/media/444137/download    Test performed by PCR.    Consider negative results in combination with clinical observations, patient history, and epidemiological information.    Comprehensive Metabolic Panel [[id number]]  (Abnormal) Collected: 02/01/22 1349    Specimen: Blood Updated: 02/01/22 1422     Glucose 72 mg/dL      BUN 21 mg/dL      Creatinine 0.81 mg/dL      Sodium 143 mmol/L      Potassium 3.7 mmol/L      Comment: Slight hemolysis detected by analyzer. Results may be affected.        Chloride 105 mmol/L      CO2 27.0 mmol/L      Calcium 9.2 mg/dL      Total Protein 7.6 g/dL      Albumin 3.90 g/dL      ALT (SGPT) 23 U/L      AST (SGOT) 22 U/L      Alkaline Phosphatase 121 U/L      Total Bilirubin 0.4 mg/dL      eGFR Non African Amer 75 mL/min/1.73       Globulin 3.7 gm/dL      A/G Ratio 1.1 g/dL      BUN/Creatinine Ratio 25.9     Anion Gap 11.0 mmol/L     Narrative:      GFR Normal >60  Chronic Kidney Disease <60  Kidney Failure <15      Troponin [[id number]]  (Normal) Collected: 02/01/22 1349    Specimen: Blood Updated: 02/01/22 1420     Troponin T <0.010 ng/mL     Narrative:      Troponin T Reference Range:  <= 0.03 ng/mL-   Negative for AMI  >0.03 ng/mL-     Abnormal for myocardial necrosis.  Clinicians would have to utilize clinical acumen, EKG, Troponin and serial changes to determine if it is an Acute Myocardial Infarction or myocardial injury due to an underlying chronic condition.       Results may be falsely decreased if patient taking Biotin.      BNP [[id number]]  (Abnormal) Collected: 02/01/22 1349    Specimen: Blood Updated: 02/01/22 1419     proBNP 2,238.0 pg/mL     Narrative:      Among patients with dyspnea, NT-proBNP is highly sensitive for the detection of acute congestive heart failure. In addition NT-proBNP of <300 pg/ml effectively rules out acute congestive heart failure with 99% negative predictive value.    Results may be falsely decreased if patient taking Biotin.      D-dimer, Quantitative [[id number]]  (Abnormal) Collected: 02/01/22 1349    Specimen: Blood Updated: 02/01/22 1417     D-Dimer, Quantitative 0.78 mg/L (FEU)     Narrative:      Reference Range is 0-0.50 mg/L FEU. However, results <0.50 mg/L FEU tends to rule out DVT or PE. Results >0.50 mg/L FEU are not useful in predicting absence or presence of DVT or PE.      Protime-INR [[id number]]  (Normal) Collected: 02/01/22 1349    Specimen: Blood Updated: 02/01/22 1415     Protime 12.4 Seconds      INR 0.96    aPTT [[id number]]  (Normal) Collected: 02/01/22 1349    Specimen: Blood Updated: 02/01/22 1415     PTT 28.6 seconds     CBC & Differential [[id number]]  (Abnormal) Collected: 02/01/22 1349    Specimen: Blood Updated: 02/01/22 1402    Narrative:      The following orders were created  for panel order CBC & Differential.  Procedure                               Abnormality         Status                     ---------                               -----------         ------                     CBC Auto Differential[[id number]]        Abnormal            Final result                 Please view results for these tests on the individual orders.    CBC Auto Differential [[id number]]  (Abnormal) Collected: 02/01/22 1349    Specimen: Blood Updated: 02/01/22 1402     WBC 9.24 10*3/mm3      RBC 5.20 10*6/mm3      Hemoglobin 12.7 g/dL      Hematocrit 41.3 %      MCV 79.4 fL      MCH 24.4 pg      MCHC 30.8 g/dL      RDW 15.3 %      RDW-SD 44.4 fl      MPV 10.0 fL      Platelets 450 10*3/mm3      Neutrophil % 61.2 %      Lymphocyte % 27.1 %      Monocyte % 7.5 %      Eosinophil % 3.4 %      Basophil % 0.5 %      Immature Grans % 0.3 %      Neutrophils, Absolute 5.66 10*3/mm3      Lymphocytes, Absolute 2.50 10*3/mm3      Monocytes, Absolute 0.69 10*3/mm3      Eosinophils, Absolute 0.31 10*3/mm3      Basophils, Absolute 0.05 10*3/mm3      Immature Grans, Absolute 0.03 10*3/mm3      nRBC 0.0 /100 WBC         Imaging Results (Last 24 Hours)     Procedure Component Value Units Date/Time    XR Chest 1 View [[id number]] Collected: 02/01/22 1410     Updated: 02/01/22 1415    Narrative:      EXAM: XR CHEST 1 VW-     INDICATION: Shortness of air     COMPARISON: 3/22/2021     FINDINGS:     Cardiac silhouette is enlarged and increased in size from prior study.  No large pleural effusion or visible pneumothorax. Bilateral diffuse  interstitial and airspace opacity greatest in the RIGHT middle and lower  lung zones. Prior median sternotomy. No acute osseous finding.       Impression:         Cardiomegaly and bilateral interstitial and airspace opacity. Favor  moderate pulmonary edema but infectious process is also within the  differential.  This report was finalized on 02/01/2022 14:12 by Dr. Ken Vaughan MD.        I have  personally reviewed and interpreted the radiology studies and ECG obtained at time of admission.     Assessment / Plan     Assessment:   There are no active hospital problems to display for this patient.    Fluid overload/pulmonary edema/suspected congestive heart failure  Covid positive with chest x-ray of pulmonary edema and/or Covid pneumonia  Cough likely related to COVID-19 infection  Hypertension  Diabetes mellitus with insulin pump -basal insulin is 2 units/h with bolus each meal depending on sugar  Hyperlipidemia  Tobacco abuse in remission  Hypothyroidism  Elevated D-dimer in the setting of COVID-19 positive    Plan:   Patient has no specific treatment needed for COVID-19.  Will use adjunct medication.  Diurese  Check echocardiogram  Address hypertension  We will have nurse facilitate insulin pump with patient per hospital protocol, as needed D50 for hypoglycemia resume home medications accordingly  Monitor daily weight, fluid status, electrolytes and renal function  Other plans per orders  Apprised patient of plan of care.  Code Status/Advanced Care Plan: Full code    The patient's surrogate decision maker is family    I discussed my findings and recommendations with the patient  Estimated length of stay is to be determined    The patient was seen and examined by me on   Electronically signed by Carlos Saul MD, 02/01/22, 15:29 CST.

## 2022-02-02 ENCOUNTER — APPOINTMENT (OUTPATIENT)
Dept: CARDIOLOGY | Facility: HOSPITAL | Age: 52
End: 2022-02-02

## 2022-02-02 LAB
ANION GAP SERPL CALCULATED.3IONS-SCNC: 12 MMOL/L (ref 5–15)
BASOPHILS # BLD AUTO: 0.04 10*3/MM3 (ref 0–0.2)
BASOPHILS NFR BLD AUTO: 0.6 % (ref 0–1.5)
BH CV ECHO MEAS - AO MAX PG (FULL): 8.1 MMHG
BH CV ECHO MEAS - AO MAX PG: 12 MMHG
BH CV ECHO MEAS - AO MEAN PG (FULL): 4 MMHG
BH CV ECHO MEAS - AO MEAN PG: 6 MMHG
BH CV ECHO MEAS - AO ROOT AREA (BSA CORRECTED): 1.5
BH CV ECHO MEAS - AO ROOT AREA: 7.5 CM^2
BH CV ECHO MEAS - AO ROOT DIAM: 3.1 CM
BH CV ECHO MEAS - AO V2 MAX: 173 CM/SEC
BH CV ECHO MEAS - AO V2 MEAN: 116 CM/SEC
BH CV ECHO MEAS - AO V2 VTI: 43.4 CM
BH CV ECHO MEAS - AVA(I,A): 1.7 CM^2
BH CV ECHO MEAS - AVA(I,D): 1.7 CM^2
BH CV ECHO MEAS - AVA(V,A): 1.8 CM^2
BH CV ECHO MEAS - AVA(V,D): 1.8 CM^2
BH CV ECHO MEAS - BSA(HAYCOCK): 2.3 M^2
BH CV ECHO MEAS - BSA: 2.1 M^2
BH CV ECHO MEAS - BZI_BMI: 47.6 KILOGRAMS/M^2
BH CV ECHO MEAS - BZI_METRIC_HEIGHT: 157.5 CM
BH CV ECHO MEAS - BZI_METRIC_WEIGHT: 117.9 KG
BH CV ECHO MEAS - EDV(CUBED): 93 ML
BH CV ECHO MEAS - EDV(MOD-SP4): 180 ML
BH CV ECHO MEAS - EDV(TEICH): 93.9 ML
BH CV ECHO MEAS - EF(CUBED): 66.7 %
BH CV ECHO MEAS - EF(MOD-SP4): 58.2 %
BH CV ECHO MEAS - EF(TEICH): 58.3 %
BH CV ECHO MEAS - ESV(CUBED): 31 ML
BH CV ECHO MEAS - ESV(MOD-SP4): 75.2 ML
BH CV ECHO MEAS - ESV(TEICH): 39.1 ML
BH CV ECHO MEAS - FS: 30.7 %
BH CV ECHO MEAS - IVS/LVPW: 0.84
BH CV ECHO MEAS - IVSD: 1.2 CM
BH CV ECHO MEAS - LA DIMENSION: 4.3 CM
BH CV ECHO MEAS - LA/AO: 1.4
BH CV ECHO MEAS - LAT PEAK E' VEL: 6.9 CM/SEC
BH CV ECHO MEAS - LV DIASTOLIC VOL/BSA (35-75): 84.2 ML/M^2
BH CV ECHO MEAS - LV MASS(C)D: 239 GRAMS
BH CV ECHO MEAS - LV MASS(C)DI: 111.8 GRAMS/M^2
BH CV ECHO MEAS - LV MAX PG: 3.9 MMHG
BH CV ECHO MEAS - LV MEAN PG: 2 MMHG
BH CV ECHO MEAS - LV SYSTOLIC VOL/BSA (12-30): 35.2 ML/M^2
BH CV ECHO MEAS - LV V1 MAX: 98.3 CM/SEC
BH CV ECHO MEAS - LV V1 MEAN: 58.1 CM/SEC
BH CV ECHO MEAS - LV V1 VTI: 24.1 CM
BH CV ECHO MEAS - LVIDD: 4.5 CM
BH CV ECHO MEAS - LVIDS: 3.1 CM
BH CV ECHO MEAS - LVLD AP4: 8.7 CM
BH CV ECHO MEAS - LVLS AP4: 7.5 CM
BH CV ECHO MEAS - LVOT AREA (M): 3.1 CM^2
BH CV ECHO MEAS - LVOT AREA: 3.1 CM^2
BH CV ECHO MEAS - LVOT DIAM: 2 CM
BH CV ECHO MEAS - LVPWD: 1.5 CM
BH CV ECHO MEAS - MED PEAK E' VEL: 5.4 CM/SEC
BH CV ECHO MEAS - MV A MAX VEL: 45.9 CM/SEC
BH CV ECHO MEAS - MV DEC SLOPE: 392 CM/SEC^2
BH CV ECHO MEAS - MV DEC TIME: 0.29 SEC
BH CV ECHO MEAS - MV E MAX VEL: 115 CM/SEC
BH CV ECHO MEAS - MV E/A: 2.5
BH CV ECHO MEAS - SI(AO): 153.3 ML/M^2
BH CV ECHO MEAS - SI(CUBED): 29 ML/M^2
BH CV ECHO MEAS - SI(LVOT): 35.4 ML/M^2
BH CV ECHO MEAS - SI(MOD-SP4): 49 ML/M^2
BH CV ECHO MEAS - SI(TEICH): 25.6 ML/M^2
BH CV ECHO MEAS - SV(AO): 327.6 ML
BH CV ECHO MEAS - SV(CUBED): 62 ML
BH CV ECHO MEAS - SV(LVOT): 75.7 ML
BH CV ECHO MEAS - SV(MOD-SP4): 104.8 ML
BH CV ECHO MEAS - SV(TEICH): 54.8 ML
BH CV ECHO MEASUREMENTS AVERAGE E/E' RATIO: 18.7
BUN SERPL-MCNC: 19 MG/DL (ref 6–20)
BUN/CREAT SERPL: 30.2 (ref 7–25)
CALCIUM SPEC-SCNC: 8.9 MG/DL (ref 8.6–10.5)
CHLORIDE SERPL-SCNC: 105 MMOL/L (ref 98–107)
CO2 SERPL-SCNC: 24 MMOL/L (ref 22–29)
CREAT SERPL-MCNC: 0.63 MG/DL (ref 0.57–1)
CRP SERPL-MCNC: 0.53 MG/DL (ref 0–0.5)
DEPRECATED RDW RBC AUTO: 43.1 FL (ref 37–54)
EOSINOPHIL # BLD AUTO: 0.27 10*3/MM3 (ref 0–0.4)
EOSINOPHIL NFR BLD AUTO: 4.3 % (ref 0.3–6.2)
ERYTHROCYTE [DISTWIDTH] IN BLOOD BY AUTOMATED COUNT: 15 % (ref 12.3–15.4)
FERRITIN SERPL-MCNC: 157.8 NG/ML (ref 13–150)
GFR SERPL CREATININE-BSD FRML MDRD: 100 ML/MIN/1.73
GLUCOSE BLDC GLUCOMTR-MCNC: 56 MG/DL (ref 70–130)
GLUCOSE BLDC GLUCOMTR-MCNC: 68 MG/DL (ref 70–130)
GLUCOSE BLDC GLUCOMTR-MCNC: 83 MG/DL (ref 70–130)
GLUCOSE BLDC GLUCOMTR-MCNC: 98 MG/DL (ref 70–130)
GLUCOSE BLDC GLUCOMTR-MCNC: 99 MG/DL (ref 70–130)
GLUCOSE SERPL-MCNC: 105 MG/DL (ref 65–99)
HCT VFR BLD AUTO: 38.6 % (ref 34–46.6)
HGB BLD-MCNC: 11.8 G/DL (ref 12–15.9)
IMM GRANULOCYTES # BLD AUTO: 0.02 10*3/MM3 (ref 0–0.05)
IMM GRANULOCYTES NFR BLD AUTO: 0.3 % (ref 0–0.5)
LEFT ATRIUM VOLUME INDEX: 34.6 ML/M2
LEFT ATRIUM VOLUME: 74 CM3
LYMPHOCYTES # BLD AUTO: 1.85 10*3/MM3 (ref 0.7–3.1)
LYMPHOCYTES NFR BLD AUTO: 29.7 % (ref 19.6–45.3)
MCH RBC QN AUTO: 24.1 PG (ref 26.6–33)
MCHC RBC AUTO-ENTMCNC: 30.6 G/DL (ref 31.5–35.7)
MCV RBC AUTO: 78.9 FL (ref 79–97)
MONOCYTES # BLD AUTO: 0.53 10*3/MM3 (ref 0.1–0.9)
MONOCYTES NFR BLD AUTO: 8.5 % (ref 5–12)
NEUTROPHILS NFR BLD AUTO: 3.51 10*3/MM3 (ref 1.7–7)
NEUTROPHILS NFR BLD AUTO: 56.6 % (ref 42.7–76)
NRBC BLD AUTO-RTO: 0 /100 WBC (ref 0–0.2)
PLATELET # BLD AUTO: 402 10*3/MM3 (ref 140–450)
PMV BLD AUTO: 10.2 FL (ref 6–12)
POTASSIUM SERPL-SCNC: 3.8 MMOL/L (ref 3.5–5.2)
RBC # BLD AUTO: 4.89 10*6/MM3 (ref 3.77–5.28)
SODIUM SERPL-SCNC: 141 MMOL/L (ref 136–145)
WBC NRBC COR # BLD: 6.22 10*3/MM3 (ref 3.4–10.8)

## 2022-02-02 PROCEDURE — 25010000002 ENOXAPARIN PER 10 MG: Performed by: INTERNAL MEDICINE

## 2022-02-02 PROCEDURE — G0378 HOSPITAL OBSERVATION PER HR: HCPCS

## 2022-02-02 PROCEDURE — 96376 TX/PRO/DX INJ SAME DRUG ADON: CPT

## 2022-02-02 PROCEDURE — 96372 THER/PROPH/DIAG INJ SC/IM: CPT

## 2022-02-02 PROCEDURE — 93306 TTE W/DOPPLER COMPLETE: CPT

## 2022-02-02 PROCEDURE — 25010000002 FUROSEMIDE PER 20 MG: Performed by: INTERNAL MEDICINE

## 2022-02-02 PROCEDURE — 94799 UNLISTED PULMONARY SVC/PX: CPT

## 2022-02-02 PROCEDURE — 25010000002 PERFLUTREN 6.52 MG/ML SUSPENSION: Performed by: INTERNAL MEDICINE

## 2022-02-02 PROCEDURE — 82728 ASSAY OF FERRITIN: CPT | Performed by: INTERNAL MEDICINE

## 2022-02-02 PROCEDURE — 86140 C-REACTIVE PROTEIN: CPT | Performed by: INTERNAL MEDICINE

## 2022-02-02 PROCEDURE — 85025 COMPLETE CBC W/AUTO DIFF WBC: CPT | Performed by: INTERNAL MEDICINE

## 2022-02-02 PROCEDURE — 93306 TTE W/DOPPLER COMPLETE: CPT | Performed by: INTERNAL MEDICINE

## 2022-02-02 PROCEDURE — 80048 BASIC METABOLIC PNL TOTAL CA: CPT | Performed by: INTERNAL MEDICINE

## 2022-02-02 PROCEDURE — 82962 GLUCOSE BLOOD TEST: CPT

## 2022-02-02 RX ADMIN — METOPROLOL TARTRATE 25 MG: 25 TABLET, FILM COATED ORAL at 21:03

## 2022-02-02 RX ADMIN — SERTRALINE 100 MG: 100 TABLET, FILM COATED ORAL at 08:56

## 2022-02-02 RX ADMIN — LISINOPRIL 5 MG: 5 TABLET ORAL at 08:56

## 2022-02-02 RX ADMIN — PERFLUTREN 8.48 MG: 6.52 INJECTION, SUSPENSION INTRAVENOUS at 11:39

## 2022-02-02 RX ADMIN — METOPROLOL TARTRATE 25 MG: 25 TABLET, FILM COATED ORAL at 08:57

## 2022-02-02 RX ADMIN — FUROSEMIDE 40 MG: 10 INJECTION, SOLUTION INTRAMUSCULAR; INTRAVENOUS at 17:47

## 2022-02-02 RX ADMIN — LEVOTHYROXINE SODIUM 150 MCG: 0.15 TABLET ORAL at 06:36

## 2022-02-02 RX ADMIN — ENOXAPARIN SODIUM 40 MG: 40 INJECTION SUBCUTANEOUS at 17:47

## 2022-02-02 RX ADMIN — ZINC SULFATE 220 MG (50 MG) CAPSULE 220 MG: CAPSULE at 08:57

## 2022-02-02 RX ADMIN — FUROSEMIDE 40 MG: 10 INJECTION, SOLUTION INTRAMUSCULAR; INTRAVENOUS at 06:36

## 2022-02-02 RX ADMIN — Medication 14.3 UNITS: at 12:00

## 2022-02-02 RX ADMIN — Medication 14.6 UNITS: at 07:00

## 2022-02-02 RX ADMIN — PANTOPRAZOLE SODIUM 40 MG: 40 TABLET, DELAYED RELEASE ORAL at 09:04

## 2022-02-02 RX ADMIN — Medication 10000 UNITS: at 08:56

## 2022-02-02 RX ADMIN — OXYCODONE HYDROCHLORIDE AND ACETAMINOPHEN 500 MG: 500 TABLET ORAL at 08:56

## 2022-02-02 RX ADMIN — SODIUM CHLORIDE, PRESERVATIVE FREE 10 ML: 5 INJECTION INTRAVENOUS at 21:03

## 2022-02-02 RX ADMIN — ASPIRIN 81 MG: 81 TABLET, CHEWABLE ORAL at 21:03

## 2022-02-02 RX ADMIN — CLOPIDOGREL 75 MG: 75 TABLET, FILM COATED ORAL at 21:03

## 2022-02-02 RX ADMIN — BUSPIRONE HYDROCHLORIDE 5 MG: 5 TABLET ORAL at 21:03

## 2022-02-02 RX ADMIN — SODIUM CHLORIDE, PRESERVATIVE FREE 10 ML: 5 INJECTION INTRAVENOUS at 08:57

## 2022-02-02 RX ADMIN — BUSPIRONE HYDROCHLORIDE 5 MG: 5 TABLET ORAL at 08:56

## 2022-02-02 RX ADMIN — ATORVASTATIN CALCIUM 80 MG: 40 TABLET, FILM COATED ORAL at 21:03

## 2022-02-02 RX ADMIN — ENOXAPARIN SODIUM 40 MG: 40 INJECTION SUBCUTANEOUS at 06:36

## 2022-02-02 NOTE — PLAN OF CARE
Goal Outcome Evaluation: Outcome Summary: NTN consult; diet education. Pt very knowledgeable of DM and MNT for conditions. Insulin pump is new; installed December. Pt wanting information to help determine consume CHO to optimize glucose control using insulin pump. Menu provided with CHO counts of meals items. Snack ordered at nighttime to prevent low BG. A1c 8.1%; pt aware of A1C hx and current level is a marked improvement from previous A1Cs. Pt would benefit from support for DM and new pump. Encouraged pt to ask questions, i.e. reach out to RDs or request Yara (RN, CDE). Pt also aware of lower sodium MNT for CHF.     NTN following.

## 2022-02-02 NOTE — PROGRESS NOTES
"Pharmacy Dosing Service  Anticoagulant  Enoxaparin    Assessment/Action/Plan:  Pharmacy consulted to dose enoxaparin for VTE prophylaxis. Initiated enoxaparin 40mg SQ Q12h (BMI~47). Pharmacy will continue to follow daily & adjust dose accordingly.      Subjective:  Krys Cox is a 52 y.o. female on Enoxaparin 40 mg SQ every 12 hours for VTE prophylaxis.    Objective:  [Ht: 157.5 cm (62\"); Wt: 118 kg (260 lb 3.2 oz); BMI: Body mass index is 47.59 kg/m².]  Estimated Creatinine Clearance: 100.3 mL/min (by C-G formula based on SCr of 0.81 mg/dL).     Lab Results   Component Value Date    INR 0.96 02/01/2022    INR 1.21 (H) 03/20/2021    INR 1.21 (H) 03/19/2021    PROTIME 12.4 02/01/2022    PROTIME 14.9 (H) 03/20/2021    PROTIME 14.9 (H) 03/19/2021      Lab Results   Component Value Date    HGB 12.7 02/01/2022    HGB 9.1 (L) 03/23/2021    HGB 9.6 (L) 03/22/2021      Lab Results   Component Value Date     02/01/2022     03/23/2021     03/22/2021       Vicente Quintana, PharmD  02/01/22 18:27 CST     "

## 2022-02-02 NOTE — NURSING NOTE
Discussed with Dr. Saul that patient reports first testing positive for Covid on 1/5/22. She quarantined and was treated at home during this time. Tested positive again today, and never tested negative after first time per patient. Patient denies any fevers or Covid symptoms besides shortness of breath. Dr. Saul states it is okay to  Remove patient from isolation as he believes that she is currently experiencing symptoms of CHF exacerbation, not Covid.

## 2022-02-02 NOTE — PLAN OF CARE
Goal Outcome Evaluation:              Outcome Summary: No c/o pain. VSS. Pt slept through the night. She has her insulin pump with her, hospital forms signed and in her chart. Log is in pt's room.

## 2022-02-02 NOTE — PAYOR COMM NOTE
"2/2/22 [facility] [phone]\  [phone]    2/2/22 INPATIENT SERVICES [place]-AUTHORIZATION.      ER ADMISSION TO INPATIENT ON 2/1/22. FAXING FOR INPATIENT.    CHF exacerbation.   J81.0    Krys Cox (52 y.o. Female)             Date of Birth Social Security Number Address Home Phone MRN    1970  [address] [place] [address]  [place] [address] [phone] [id number]    [employer] Marital Status             Yazdanism        Admission Date Admission Type Admitting Provider Attending Provider Department, Room/Bed    2/1/22 Emergency Carlos Saul MD [place], Glenn Riego, MD [address], 408/1    Discharge Date Discharge Disposition Discharge Destination                         Attending Provider: Carlos Saul MD    Allergies: No Known Allergies    Isolation: Enh Drop/Con   Infection: COVID (confirmed) (02/01/22)   Code Status: CPR   Advance Care Planning Activity    Ht: 157.5 cm (62\")   Wt: 118 kg (260 lb 3 oz)    Admission Cmt: None   Principal Problem: None                Active Insurance as of 2/1/2022     Primary Coverage     Payor Plan Insurance Group Employer/Plan Group    [employer] [place]ICA[place]      Payor Plan Address Payor Plan Phone Number Payor Plan Fax Number Effective Dates    [address] [address] [phone]  2/1/2022 - None Entered    [facility] [id number]       Subscriber Name Subscriber Birth Date Member ID       KRYS COX 1970 [id number]                 Emergency Contacts      (Rel.) Home Phone Work Phone Mobile Phone    CHELA COX (Spouse) -- -- [phone]              [facility] Encounter Date/Time: 2/1/2022 [facility]   Hospital Account: [id number]    MRN: [id number]   Patient:  Krys Cox   Contact Serial #: [id number]   SSN:          ENCOUNTER             Patient Class: Observation   Unit: [address] Service: Medicine     Bed: 408/1 "   Admitting Provider: Carlos Saul*   Referring Physician: Provider, No Known   Attending Provider: Carlos Saul*   Adm Diagnosis: Acute pulmonary edema (H*               PATIENT          Name: Krys Cox : 1970 (52 yrs)   Address:  [facility] Sex: Female   City: [place] [id number]   County: [facility]   Marital Status:  Ethnicity: NOT                                                                              Race: WHITE   Primary Care Provider: Karolina Toney APRN Patients Phone: Home Phone: [phone]     Mobile Phone: [phone]   EMERGENCY CONTACT   Contact Name Legal Guardian? Relationship to Patient Home Phone Work Phone   1. CHELA COX  2. *No Contact Specified*      Spouse                   GUARANTOR            Guarantor: Krys Cox     : 1970   Address: [address] [place] [address] Sex: Female     [place] [id number]     Relation to Patient: Self       Home Phone: [phone]   Guarantor ID: [id number]       Work Phone:     GUARANTOR EMPLOYER   Employer:           Status: NOT EMPLO*   COVERAGE          PRIMARY INSURANCE   Payor: [facility] Plan: [facility] MEDICAID   Group Number:   Insurance Type: INDEMNITY   Subscriber Name: KRYS COX Subscriber : 1970   Subscriber ID: [id number] Coverage Address: [place] [address]   Pat. Rel. to Subscriber: Self Coverage Phone: [phone]   SECONDARY INSURANCE   Payor: N/A Plan: N/A   Group Number:   Insurance Type:     Subscriber Name:   Subscriber :     Subscriber ID:   Coverage Address:     Pat. Rel. to Subscriber:   Coverage Phone:        Contact Serial # ([id number])  `       2022    Chart ID ([id number])            MD Mohit   Physician   Specialty:  Emergency Medicine   ED Provider Notes      Signed   Date of Service:  22   Creation Time:  22              Signed        Expand AllCollapse All           Show:Clear all  [x]Manual[x]Template[]Copied    Added by:  [x]Mohit Delgado MD      []Lana for details       Subjective      Patient is a 32-year-old who came the ER complain of shortness of breath cough.  No other complaint associate with this reason gained 8 kg over the last 2 weeks.        Shortness of Breath  Severity:  Moderate  Onset quality:  Gradual  Timing:  Constant  Progression:  Worsening  Chronicity:  New  Context: activity    Context: not animal exposure, not emotional upset, not fumes, not known allergens, not occupational exposure, not pollens and not URI    Relieved by:  Nothing  Worsened by:  Exertion  Ineffective treatments:  Diuretics  Associated symptoms: cough, PND and wheezing    Associated symptoms: no abdominal pain, no chest pain, no claudication, no diaphoresis, no ear pain, no fever, no headaches, no neck pain, no rash, no sore throat, no sputum production, no syncope, no swollen glands and no vomiting    Risk factors: obesity    Risk factors: no recent alcohol use, no family hx of DVT, no hx of PE/DVT and no tobacco use    Leg Swelling  Associated symptoms: cough, shortness of breath and wheezing    Associated symptoms: no abdominal pain, no chest pain, no congestion, no diarrhea, no ear pain, no fatigue, no fever, no headaches, no myalgias, no nausea, no rash, no sore throat and no vomiting                  Review of Systems   Constitutional: Negative.  Negative for activity change, appetite change, chills, diaphoresis, fatigue and fever.   HENT: Negative for congestion, drooling, ear pain, facial swelling, hearing loss, sinus pressure and sore throat.    Eyes: Negative.  Negative for discharge.   Respiratory: Positive for cough, shortness of breath and wheezing. Negative for sputum production.    Cardiovascular: Positive for PND. Negative for chest pain, claudication and syncope.   Gastrointestinal: Negative for abdominal distention, abdominal pain, blood in stool, diarrhea,  nausea and vomiting.   Endocrine: Negative.  Negative for cold intolerance, heat intolerance, polydipsia, polyphagia and polyuria.   Genitourinary: Negative.  Negative for dysuria, flank pain and urgency.   Musculoskeletal: Negative.  Negative for arthralgias, back pain, myalgias, neck pain and neck stiffness.   Skin: Negative.  Negative for color change, pallor and rash.   Allergic/Immunologic: Negative.    Neurological: Negative.  Negative for dizziness, seizures, speech difficulty, weakness, numbness and headaches.   Hematological: Negative.  Negative for adenopathy.   All other systems reviewed and are negative.        Medical History:   Diagnosis Date   • Arthritis     • CHF (congestive heart failure) (HCC)     • Diabetes mellitus (HCC)     • Disease of thyroid gland     • Elevated cholesterol     • Hypertension        Objective      Physical Exam  Vitals and nursing note reviewed. Exam conducted with a chaperone present.   Constitutional:       General: She is not in acute distress.     Appearance: Normal appearance. She is well-developed. She is ill-appearing. She is not toxic-appearing or diaphoretic.   HENT:      Head: Normocephalic and atraumatic.      Right Ear: External ear normal.      Nose: Nose normal.      Mouth/Throat:      Mouth: Mucous membranes are moist.   Eyes:      Conjunctiva/sclera: Conjunctivae normal.      Pupils: Pupils are equal, round, and reactive to light.   Cardiovascular:      Rate and Rhythm: Normal rate and regular rhythm.      Chest Wall: PMI is not displaced.      Pulses: Normal pulses. No decreased pulses.      Heart sounds: No murmur heard.   No systolic murmur is present.   No diastolic murmur is present.  Gallop present. S3 sounds present.    Pulmonary:      Effort: Pulmonary effort is normal. No tachypnea, accessory muscle usage or respiratory distress.      Breath sounds: No stridor. Examination of the right-middle field reveals rales. Examination of the left-middle field  reveals rales. Examination of the right-lower field reveals rales. Examination of the left-lower field reveals rales. Rales present. No decreased breath sounds, wheezing or rhonchi.   Chest:      Chest wall: No tenderness or crepitus.   Abdominal:      General: Bowel sounds are normal. There is no distension.      Palpations: Abdomen is soft.      Tenderness: There is no abdominal tenderness.   Musculoskeletal:         General: No swelling or tenderness. Normal range of motion.      Cervical back: Normal range of motion and neck supple. No rigidity.      Right lower le+ Edema present.      Left lower le+ Edema present.      Comments: Lower extremity exam bilaterally is unremarkable.  There is no right or left calf tenderness .  There is no palpable venous cord.  No obvious difference in the size of the legs.  No pitting edema.  The dorsalis pedis and posterior tibial femoral and popliteal pulses are palpable and +2 bilaterally.  Homans sign is negative   Skin:     General: Skin is warm.      Capillary Refill: Capillary refill takes less than 2 seconds.      Coloration: Skin is not jaundiced.      Findings: No erythema or rash.   Neurological:      General: No focal deficit present.      Mental Status: She is alert and oriented to person, place, and time. Mental status is at baseline.      Cranial Nerves: No cranial nerve deficit.      Motor: No weakness.      Coordination: Coordination normal.      Deep Tendon Reflexes: Reflexes are normal and symmetric. Reflexes normal.   Psychiatric:         Mood and Affect: Mood normal.            Procedures                 ED Course      ED Course as of 22 1537   e 2022   1316 ·           Left ventricular ejection fraction appears to be 56 - 60%. Left ventricular systolic function is normal.  ·           Left ventricular diastolic function is consistent with (grade I) impaired relaxation.  ·           Left atrial volume is severely increased.  ·            Estimated right ventricular systolic pressure from tricuspid regurgitation is normal (<35 mmHg).     Patient states that she got history of CHF [TS]   1510 Years criteria PE excluded  [TS]   1534 Patient was seen by the primary MD about 7 or 8 days ago placed on diuretics blood she is still short of breath and has gained approximately 14 pounds.  Patient is still short of breath or dyspnea on exertion no chest pain she has got elevated BNP she does have a history of CHF with preserved ejection fraction we will give her Lasix in the ED I discussed this case with the medicine service she has not gotten better with outpatient treatment will be admitted for IV diuretics she is also Covid positive. [TS]       ED Course User Index  [TS] Mohit Delgado MD                                           MDM  Number of Diagnoses or Management Options  Diagnosis management comments: Differential Diagnosis:  I considered pulmonary etiology, asthma, chronic obstructive pulmonary disease, pneumonia, pulmonary embolism, adult respiratory distress syndrome, pneumothorax, pleural effusion, pulmonary fibrosis, cardiac etiology, congestive heart failure, myocardial infarction, metabolic etiology, diabetic ketoacidosis, uremia, acidosis, sepsis, anemia, drug related etiology, hyperventilation and CNS disease as a possible cause of dyspnea in this patient. This is a partial list of diagnoses considered.             Amount and/or Complexity of Data Reviewed  Clinical lab tests: reviewed and ordered  Tests in the radiology section of CPT®: ordered and reviewed  Tests in the medicine section of CPT®: ordered and reviewed     Risk of Complications, Morbidity, and/or Mortality  Presenting problems: moderate  Diagnostic procedures: moderate  Management options: moderate         Final diagnoses:   Acute pulmonary edema (HCC)   Acute congestive heart failure, unspecified heart failure type (HCC)   COVID         ED Disposition         ED Disposition       ED Disposition Condition Comment     Decision to Admit   Level of Care: Telemetry [5]   Diagnosis: Acute pulmonary edema (HCC) [023008]   Admitting Physician: CARLOS SAUL [[id number]]   Attending Physician: CARLOS SAUL [[id number]]                No follow-up provider specified.         Medication List       No changes were made to your prescriptions during this visit.            Mohit Delgado MD  02/01/22 1537                  ED to Hosp-Admission (Current) on 2/1/2022     ED to Hosp-Admission (Current) on 2/1/2022            Component   Ref Range & Units    COVID19   Not Detected - Ref. Range Detected Critical           Department Encounter #   2/1/2022  1:02 PM [address] [id number]     Carlos Saul MD   Physician   Hospitalist   H&P      Signed   Date of Service:  02/01/22 1528   Creation Time:  02/01/22 1528              Signed        Expand AllCollapse All          Show:Clear all  [x]Manual[x]Template[x]Copied    Added by:  [x]Carlos Saul MD      [][facility] for details         [facility] Medicine Services  HISTORY AND PHYSICAL     Date of Admission: 2/1/2022  Primary Care Physician: Karolina Toney APRN     Subjective      Chief Complaint: sob  History of Present Illness  I am asked admit the patient for CHF exacerbation.  Patient also tested positive for COVID-19 although oxygen saturation 91 to 97% as she was ambulated in the room.  Patient initial complaint of the emergency room was shortness of breath and leg swelling.  Blood pressure was 191/80.  She was afebrile  Per discussion with Dr. Delgado, she had stopped her Lasix for a while and gained 14 pounds.     Review of diagnostic studies showed normal sodium potassium CO2  Troponin T is less than 0.010  proBNP 2238  D-dimer 0.78  Normal white count of 9.24 with predominance of neutrophils at 61  Hemoglobin 12.7 MCV not 79.  Chest x-ray reported as cardiomegaly and bilateral  interstitial and airspace opacity favoring moderate pulmonary edema but infectious process also within differential.                   Hemoglobin 12.7 MCV not 79.  Chest x-ray reported as cardiomegaly and bilateral interstitial and airspace opacity favoring moderate pulmonary edema but infectious process also within differential.           Intra-Op TAVR Transesophageal Echo     Interpretation Summary  · Left ventricular ejection fraction appears to be 56 - 60%. Left ventricular systolic function is normal.  · Postop no free air       She has history of coronary artery bypass graft x3 on 2021        Her other medical problems as listed from this hospitalization includes hypertension, diabetes, morbid obesity, tobacco abuse  Osteoarthritis, history of peptic ulcer disease and lung nodules        Sob  increasing swelling  Wakes up sob x couple of weeks  Similar symptoms when she had CHF of2631  New rx since . Minoxidil and Lasix - been taking these regularly     DM - Has insulin pump     HTN, HLD, Thyroid disorder     Review of Systems   No fever, chill  + cough all the time. Tobacco abuse in remission  int wheezing  No diarrhea  No abdominal  No n/v  No fall  No loc, syncope  Smell issue is hereditary to her  No loss of taste  Stays tired all the time        Otherwise complete ROS reviewed and negative except as mentioned in the HPI.     Past Medical History:   Medical History        Past Medical History:   Diagnosis Date   • Arthritis     • CHF (congestive heart failure) (HCC)     • Diabetes mellitus (HCC)     • Disease of thyroid gland     • Elevated cholesterol     • Hypertension           Past Surgical History:  Surgical History         Past Surgical History:   Procedure Laterality Date   • CARDIAC CATHETERIZATION       •  SECTION         she has had 4   • CORONARY ARTERY BYPASS GRAFT N/A 3/19/2021     Procedure: CORONARY ARTERY BYPASS GRAFT X 3 WITH LEFT INTERNAL MAMMARY ARTERY, BILATERAL  "LOWER EXTREMITY OPEN VEIN HARVEST, AND PERFUSION; APPLICATION OF PREVENA INCISIONAL WOUND VAC X 3; TRANSESOPHAGEAL ECHOCARDIOGRAM;  Surgeon: Vicente Thomas MD;  Location: [facility];  Service: Cardiothoracic;  Laterality: N/A;   • DILATATION AND CURETTAGE       • TUBAL ABDOMINAL LIGATION             Social History:  reports that she quit smoking about 10 months ago. Her smoking use included cigarettes. She started smoking about 35 years ago. She has a 30.00 pack-year smoking history. She has never used smokeless tobacco. She reports previous alcohol use. She reports that she does not use drugs.     Family History:  Mom passed age 30 \"blood clot; breast and lung cancer runs in family        Allergies:  No Known Allergies     Medications:     Lasix and minoxidil recently added since Jan 24          Prior to Admission medications    Medication Sig Start Date End Date Taking? Authorizing Provider   aspirin 81 MG chewable tablet Chew 81 mg Daily.       ProviderAngelica MD   atorvastatin (LIPITOR) 40 MG tablet Take 1 tablet by mouth Every Night.  Patient taking differently: Take 80 mg by mouth Every Night. 3/24/21     Vicente Thomas MD   busPIRone (BUSPAR) 5 MG tablet Take 5 mg by mouth 2 (Two) Times a Day. 5/21/21     Angelica Renteria MD   clopidogrel (PLAVIX) 75 MG tablet Take 1 tablet by mouth Daily. 3/24/21     Vicente Thomas MD   insulin lispro (humaLOG) 100 UNIT/ML injection Inject 1-6 Units under the skin into the appropriate area as directed 3 (Three) Times a Day. 1-6 units three times a day as needed       ProviderAngelica MD   levothyroxine (SYNTHROID, LEVOTHROID) 150 MCG tablet Take 150 mcg by mouth Daily.       Angelica Renteria MD   lisinopril (PRINIVIL,ZESTRIL)20 MG tablet Take 1 tablet by mouth Daily. 3/24/21     Vicente Thomas MD   metoprolol tartrate (LOPRESSOR) 25 MG tablet Take 1 tablet by mouth 2 (Two) Times a Day. 3/24/21     Vicente Thomas MD   sertraline " "(ZOLOFT) 100 MG tablet Take 100 mg by mouth Daily.       Provider, MD Angelica   vitamin D3 125 MCG (5000 UT) capsule capsule Take 10,000 Units by mouth Daily.       Provider, MD Angelica   silver sulfadiazine (SILVADENE, SSD) 1 % cream Apply  topically to the appropriate area as directed 2 (Two) Times a Day. Apply to left neck affected area 3/24/21 2/1/22   Vicente Thomas MD      I have utilized all available immediate resources to obtain, update, and review the patient's current medications.     Objective      Vital Signs: /82 (BP Location: Left arm, Patient Position: Sitting)   Pulse 85   Temp 98 °F (36.7 °C) (Oral)   Resp (!) 31   Ht 157.5 cm (62\")   Wt 122 kg (268 lb)   LMP 03/17/2020 (Approximate)   SpO2 97%   BMI 49.02 kg/m²   Physical Exam  Morbidly obese  Pleasant woman  No distress  Elevated blood pressure noted  O2 saturation 100% in room air  No accessory muscle use  Nonlabored breathing at time of examination  Speaks in full sentences  GEN: Awake, alert, interactive, in NAD  HEENT: Atraumatic, PERRLA, EOMI, Anicteric, Trachea midline  Lungs: CTAB, no wheezing/rales/rhonchi  Heart: RRR, +S1/s2, no rub  ABD: soft, nt/nd, +BS, no guarding/rebound  Extremities: atraumatic, no cyanosis, positive pitting edema bilateral lower extremities  Skin: no rashes or lesions  Neuro: AAOx3, no focal deficits  Psych: normal mood & affect           Results Reviewed:           Lab Results (last 24 hours)      Procedure Component Value Units Date/Time     COVID PRE-OP / PRE-PROCEDURE SCREENING ORDER (NO ISOLATION) - Swab, Nasal Cavity [[id number]]  (Abnormal) Collected: 02/01/22 1345     Specimen: Swab from Nasal Cavity Updated: 02/01/22 1445     Narrative:       The following orders were created for panel order COVID PRE-OP / PRE-PROCEDURE SCREENING ORDER (NO ISOLATION) - Swab, Nasal Cavity.  Procedure                               Abnormality         Status                     ---------              "                  -----------         ------                     COVID-19,[facility] IN-NATHALIE...[[id number]]  Abnormal            Final result                  Please view results for these tests on the individual orders.     COVID-19,[facility] IN-HOUSE,Nasal Swab No Transport Media 3-4 HR TAT - Swab, Nasal Cavity [[id number]]  (Abnormal) Collected: 02/01/22 1345     Specimen: Swab from Nasal Cavity Updated: 02/01/22 1445       COVID19 Detected     Narrative:       Fact sheet for providers: https://www.fda.gov/media/183679/download      Fact sheet for patients: https://www.fda.gov/media/991777/download     Test performed by PCR.     Consider negative results in combination with clinical observations, patient history, and epidemiological information.     Comprehensive Metabolic Panel [[id number]]  (Abnormal) Collected: 02/01/22 1349     Specimen: Blood Updated: 02/01/22 1422       Glucose 72 mg/dL         BUN 21 mg/dL         Creatinine 0.81 mg/dL         Sodium 143 mmol/L         Potassium 3.7 mmol/L         Comment: Slight hemolysis detected by analyzer. Results may be affected.          Chloride 105 mmol/L         CO2 27.0 mmol/L         Calcium 9.2 mg/dL         Total Protein 7.6 g/dL         Albumin 3.90 g/dL         ALT (SGPT) 23 U/L         AST (SGOT) 22 U/L         Alkaline Phosphatase 121 U/L         Total Bilirubin 0.4 mg/dL         eGFR Non African Amer 75 mL/min/1.73         Globulin 3.7 gm/dL         A/G Ratio 1.1 g/dL         BUN/Creatinine Ratio 25.9       Anion Gap 11.0 mmol/L       Narrative:       GFR Normal >60  Chronic Kidney Disease <60  Kidney Failure <15        Troponin [[id number]]  (Normal) Collected: 02/01/22 1349     Specimen: Blood Updated: 02/01/22 1420       Troponin T <0.010 ng/mL       Narrative:       Troponin T Reference Range:  <= 0.03 ng/mL-   Negative for AMI  >0.03 ng/mL-     Abnormal for myocardial necrosis.  Clinicians would have to utilize clinical acumen, EKG, Troponin and serial  changes to determine if it is an Acute Myocardial Infarction or myocardial injury due to an underlying chronic condition.         Results may be falsely decreased if patient taking Biotin.        BNP [[id number]]  (Abnormal) Collected: 02/01/22 1349     Specimen: Blood Updated: 02/01/22 1419       proBNP 2,238.0 pg/mL       Narrative:       Among patients with dyspnea, NT-proBNP is highly sensitive for the detection of acute congestive heart failure. In addition NT-proBNP of <300 pg/ml effectively rules out acute congestive heart failure with 99% negative predictive value.     Results may be falsely decreased if patient taking Biotin.        D-dimer, Quantitative [[id number]]  (Abnormal) Collected: 02/01/22 1349     Specimen: Blood Updated: 02/01/22 1417       D-Dimer, Quantitative 0.78 mg/L (FEU)       Narrative:       Reference Range is 0-0.50 mg/L FEU. However, results <0.50 mg/L FEU tends to rule out DVT or PE. Results >0.50 mg/L FEU are not useful in predicting absence or presence of DVT or PE.        Protime-INR [[id number]]  (Normal) Collected: 02/01/22 1349     Specimen: Blood Updated: 02/01/22 1415       Protime 12.4 Seconds         INR 0.96     aPTT [[id number]]  (Normal) Collected: 02/01/22 1349     Specimen: Blood Updated: 02/01/22 1415       PTT 28.6 seconds       CBC & Differential [[id number]]  (Abnormal) Collected: 02/01/22 1349     Specimen: Blood Updated: 02/01/22 1402     Narrative:       The following orders were created for panel order CBC & Differential.  Procedure                               Abnormality         Status                     ---------                               -----------         ------                     CBC Auto Differential[[id number]]        Abnormal            Final result                  Please view results for these tests on the individual orders.     CBC Auto Differential [[id number]]  (Abnormal) Collected: 02/01/22 1349     Specimen: Blood Updated: 02/01/22 1402        WBC 9.24 10*3/mm3         RBC 5.20 10*6/mm3         Hemoglobin 12.7 g/dL         Hematocrit 41.3 %         MCV 79.4 fL         MCH 24.4 pg         MCHC 30.8 g/dL         RDW 15.3 %         RDW-SD 44.4 fl         MPV 10.0 fL         Platelets 450 10*3/mm3         Neutrophil % 61.2 %         Lymphocyte % 27.1 %         Monocyte % 7.5 %         Eosinophil % 3.4 %         Basophil % 0.5 %         Immature Grans % 0.3 %         Neutrophils, Absolute 5.66 10*3/mm3         Lymphocytes, Absolute 2.50 10*3/mm3         Monocytes, Absolute 0.69 10*3/mm3         Eosinophils, Absolute 0.31 10*3/mm3         Basophils, Absolute 0.05 10*3/mm3         Immature Grans, Absolute 0.03 10*3/mm3         nRBC 0.0 /100 WBC                     Imaging Results (Last 24 Hours)      Procedure Component Value Units Date/Time     XR Chest 1 View [[id number]] Collected: 02/01/22 1410       Updated: 02/01/22 1415     Narrative:       EXAM: XR CHEST 1 VW-     INDICATION: Shortness of air     COMPARISON: 3/22/2021     FINDINGS:     Cardiac silhouette is enlarged and increased in size from prior study.  No large pleural effusion or visible pneumothorax. Bilateral diffuse  interstitial and airspace opacity greatest in the RIGHT middle and lower  lung zones. Prior median sternotomy. No acute osseous finding.        Impression:          Cardiomegaly and bilateral interstitial and airspace opacity. Favor  moderate pulmonary edema but infectious process is also within the  differential.  This report was finalized on 02/01/2022 14:12 by Dr. Ken Vaughan MD.          I have personally reviewed and interpreted the radiology studies and ECG obtained at time of admission.      Assessment / Plan      Assessment:   There are no active hospital problems to display for this patient.     Fluid overload/pulmonary edema/suspected congestive heart failure  Covid positive with chest x-ray of pulmonary edema and/or Covid pneumonia  Cough likely related to COVID-19  infection  Hypertension  Diabetes mellitus with insulin pump -basal insulin is 2 units/h with bolus each meal depending on sugar  Hyperlipidemia  Tobacco abuse in remission  Hypothyroidism  Elevated D-dimer in the setting of COVID-19 positive     Plan:   Patient has no specific treatment needed for COVID-19.  Will use adjunct medication.  Diurese  Check echocardiogram  Address hypertension  We will have nurse facilitate insulin pump with patient per hospital protocol, as needed D50 for hypoglycemia resume home medications accordingly  Monitor daily weight, fluid status, electrolytes and renal function  Other plans per orders  Apprised patient of plan of care.  Code Status/Advanced Care Plan: Full code     The patient's surrogate decision maker is family     I discussed my findings and recommendations with the patient  Estimated length of stay is to be determined     The patient was seen and examined by me on   Electronically signed by Carols Saul MD, 02/01/22, 15:29 CST.                                 ED to Hosp-Admission (Current) on 2/1/2022       Notes    Component   Ref Range & Units 04:59 1 d ago 10 mo ago   Ferritin   13.00 - 150.00 ng/mL 157.80 High           reactive Protein  Order: [id number]   Status: Final result     Visible to patient: No (scheduled for 2/2/2022  8:39 PM)     Next appt: 03/09/2022 at 09:00 AM in Cardiothoracic Surgery (Vicente Thomas MD)    Specimen Information: Blood         0 Result Notes    Component   Ref Range & Units 04:59 1 d ago 10 mo ago   C-Reactive Protein   0.00 - 0.50 mg/dL 0.53 High            Notes    Component   Ref Range & Units 04:59   (2/2/22) 1 d ago   (2/1/22) 10 mo ago   (3/23/21) 10 mo ago   (3/22/21) 10 mo ago   (3/21/21) 10 mo ago   (3/20/21) 10 mo ago   (3/19/21)   WBC   3.40 - 10.80 10*3/mm3 6.22  9.24  10.31  15.75 High   18.88 High   12.71 High   9.52    RBC   3.77 - 5.28 10*6/mm3 4.89  5.20  3.33 Low   3.51 Low   3.62 Low   3.53 Low   4.11     Hemoglobin   12.0 - 15.9 g/dL 11.8 Low   12.7  9.1 Low   9.6 Low   9.8 Low   9.7 Low   11.1 Low     Hematocrit   34.0 - 46.6 % 38.6  41.3  27.3 Low   29.2 Low   29.9 Low   29.3 Low   33.2 Low     MCV   79.0 - 97.0 fL 78.9 Low   79.4  82.0  83.2  82.6  83.0  80.8    MCH   26.6 - 33.0 pg 24.1 Low   24.4 Low   27.3  27.4  27.1  27.5  27.0    MCHC   31.5 - 35.7 g/dL 30.6                    Current Facility-Administered Medications   Medication Dose Route Frequency Provider Last Rate Last Admin   • acetaminophen (TYLENOL) tablet 650 mg  650 mg Oral Q4H PRN Carlos Saul MD        Or   • acetaminophen (TYLENOL) 160 MG/5ML solution 650 mg  650 mg Oral Q4H PRN Carlos Saul MD        Or   • acetaminophen (TYLENOL) suppository 650 mg  650 mg Rectal Q4H PRN Carlos Saul MD       • albuterol sulfate HFA (PROVENTIL HFA;VENTOLIN HFA;PROAIR HFA) inhaler 2 puff  2 puff Inhalation Q6H PRN Carlos Saul MD       • ascorbic acid (VITAMIN C) tablet 500 mg  500 mg Oral Daily Carlos Saul MD   500 mg at 02/02/22 0856   • aspirin chewable tablet 81 mg  81 mg Oral Nightly Carlos Saul MD   81 mg at 02/01/22 2208   • atorvastatin (LIPITOR) tablet 80 mg  80 mg Oral Nightly Carlos Saul MD   80 mg at 02/01/22 2204   • benzonatate (TESSALON) capsule 100 mg  100 mg Oral TID PRN Carlos Saul MD       • busPIRone (BUSPAR) tablet 5 mg  5 mg Oral BID Carlos Saul MD   5 mg at 02/02/22 0856   • clopidogrel (PLAVIX) tablet 75 mg  75 mg Oral Nightly Carlos Saul MD   75 mg at 02/01/22 2208   • dextrose (D50W) (25 g/50 mL) IV injection 25 g  25 g Intravenous Q15 Min PRCarlos Jimenez MD       • dextrose (GLUTOSE) oral gel 15 g  15 g Oral Q15 Min PRCarlos Jimenez MD       • enoxaparin (LOVENOX) syringe 40 mg  40 mg Subcutaneous Q12H Carlos Saul MD   40 mg at 02/02/22 0636   • furosemide  (LASIX) injection 40 mg  40 mg Intravenous Q12H Carlos Saul MD   40 mg at 02/02/22 0636   • glucagon (human recombinant) (GLUCAGEN DIAGNOSTIC) injection 1 mg  1 mg Subcutaneous Q15 Min PRN Carlos Saul MD       • insulin patient supplied pump   Subcutaneous Continuous Carlos Saul MD       • levothyroxine (SYNTHROID, LEVOTHROID) tablet 150 mcg  150 mcg Oral Q AM Carlos Saul MD   150 mcg at 02/02/22 0636   • lisinopril (PRINIVIL,ZESTRIL) tablet 5 mg  5 mg Oral Daily Carlos Saul MD   5 mg at 02/02/22 0856   • melatonin tablet 6 mg  6 mg Oral Nightly PRN Carlos Saul MD       • metoprolol tartrate (LOPRESSOR) tablet 25 mg  25 mg Oral BID Carlos Saul MD   25 mg at 02/02/22 0857   • nitroglycerin (NITROSTAT) SL tablet 0.4 mg  0.4 mg Sublingual Q5 Min PRN Carlos Saul MD       • ondansetron (ZOFRAN) injection 4 mg  4 mg Intravenous Q6H PRN Carlos Saul MD       • pantoprazole (PROTONIX) EC tablet 40 mg  40 mg Oral Daily Carlos Saul MD   40 mg at 02/02/22 0904   • Pharmacy to Dose enoxaparin (LOVENOX)   Does not apply Continuous PRN Carlos Saul MD       • sertraline (ZOLOFT) tablet 100 mg  100 mg Oral Daily Carlos Saul MD   100 mg at 02/02/22 0856   • sodium chloride 0.9 % flush 10 mL  10 mL Intravenous Q12H Carlos Saul MD   10 mL at 02/02/22 0857   • sodium chloride 0.9 % flush 10 mL  10 mL Intravenous PRN Carlos Saul MD       • vitamin D3 capsule 10,000 Units  10,000 Units Oral Daily Carlos Saul MD   10,000 Units at 02/02/22 0856   • zinc sulfate (ZINCATE) capsule 220 mg  220 mg Oral Daily Carlos Saul MD   220 mg at 02/02/22 0857

## 2022-02-02 NOTE — PLAN OF CARE
Goal Outcome Evaluation:  Plan of Care Reviewed With: patient        Progress: improving  Outcome Summary: Patient states that she feels better today and is breathing better than when she arroved.  Lung sounds are clear and equal in all quadrants.  Insulin pump contract in chart and insulin administration entered into the MAR.  VSS, patient resting comfortably on room air.

## 2022-02-03 ENCOUNTER — READMISSION MANAGEMENT (OUTPATIENT)
Dept: CALL CENTER | Facility: HOSPITAL | Age: 52
End: 2022-02-03

## 2022-02-03 VITALS
BODY MASS INDEX: 47.15 KG/M2 | WEIGHT: 256.2 LBS | RESPIRATION RATE: 18 BRPM | HEIGHT: 62 IN | SYSTOLIC BLOOD PRESSURE: 140 MMHG | HEART RATE: 61 BPM | OXYGEN SATURATION: 99 % | TEMPERATURE: 97.9 F | DIASTOLIC BLOOD PRESSURE: 78 MMHG

## 2022-02-03 PROBLEM — E78.5 HLD (HYPERLIPIDEMIA): Status: ACTIVE | Noted: 2022-02-03

## 2022-02-03 PROBLEM — I11.0 HYPERTENSIVE HEART DISEASE WITH ACUTE DIASTOLIC CONGESTIVE HEART FAILURE: Status: ACTIVE | Noted: 2021-03-18

## 2022-02-03 PROBLEM — I25.110 CORONARY ARTERY DISEASE INVOLVING NATIVE CORONARY ARTERY OF NATIVE HEART WITH UNSTABLE ANGINA PECTORIS (HCC): Status: RESOLVED | Noted: 2021-03-18 | Resolved: 2022-02-03

## 2022-02-03 PROBLEM — E87.70 FLUID OVERLOAD: Status: ACTIVE | Noted: 2022-02-03

## 2022-02-03 PROBLEM — Z79.4 TYPE 2 DIABETES MELLITUS WITH COMPLICATION, WITH LONG-TERM CURRENT USE OF INSULIN (HCC): Status: ACTIVE | Noted: 2022-02-03

## 2022-02-03 PROBLEM — E11.8 TYPE 2 DIABETES MELLITUS WITH COMPLICATION, WITH LONG-TERM CURRENT USE OF INSULIN: Status: ACTIVE | Noted: 2022-02-03

## 2022-02-03 PROBLEM — I50.31 HYPERTENSIVE HEART DISEASE WITH ACUTE DIASTOLIC CONGESTIVE HEART FAILURE: Status: ACTIVE | Noted: 2021-03-18

## 2022-02-03 PROBLEM — I50.31 ACUTE DIASTOLIC HEART FAILURE: Status: ACTIVE | Noted: 2022-02-03

## 2022-02-03 LAB
ANION GAP SERPL CALCULATED.3IONS-SCNC: 8 MMOL/L (ref 5–15)
BUN SERPL-MCNC: 19 MG/DL (ref 6–20)
BUN/CREAT SERPL: 26.4 (ref 7–25)
CALCIUM SPEC-SCNC: 8.3 MG/DL (ref 8.6–10.5)
CHLORIDE SERPL-SCNC: 105 MMOL/L (ref 98–107)
CO2 SERPL-SCNC: 29 MMOL/L (ref 22–29)
CREAT SERPL-MCNC: 0.72 MG/DL (ref 0.57–1)
GFR SERPL CREATININE-BSD FRML MDRD: 85 ML/MIN/1.73
GLUCOSE BLDC GLUCOMTR-MCNC: 159 MG/DL (ref 70–130)
GLUCOSE SERPL-MCNC: 302 MG/DL (ref 65–99)
POTASSIUM SERPL-SCNC: 4.1 MMOL/L (ref 3.5–5.2)
QT INTERVAL: 434 MS
QTC INTERVAL: 494 MS
SODIUM SERPL-SCNC: 142 MMOL/L (ref 136–145)

## 2022-02-03 PROCEDURE — 80048 BASIC METABOLIC PNL TOTAL CA: CPT | Performed by: INTERNAL MEDICINE

## 2022-02-03 PROCEDURE — 96372 THER/PROPH/DIAG INJ SC/IM: CPT

## 2022-02-03 PROCEDURE — 25010000002 FUROSEMIDE PER 20 MG: Performed by: INTERNAL MEDICINE

## 2022-02-03 PROCEDURE — 25010000002 ENOXAPARIN PER 10 MG: Performed by: INTERNAL MEDICINE

## 2022-02-03 PROCEDURE — G0378 HOSPITAL OBSERVATION PER HR: HCPCS

## 2022-02-03 PROCEDURE — 82962 GLUCOSE BLOOD TEST: CPT

## 2022-02-03 PROCEDURE — 96376 TX/PRO/DX INJ SAME DRUG ADON: CPT

## 2022-02-03 RX ORDER — ZINC SULFATE 50(220)MG
220 CAPSULE ORAL DAILY
Qty: 10 CAPSULE | Refills: 0 | Status: SHIPPED | OUTPATIENT
Start: 2022-02-03 | End: 2022-03-29

## 2022-02-03 RX ORDER — CARVEDILOL 3.12 MG/1
3.12 TABLET ORAL 2 TIMES DAILY WITH MEALS
Qty: 30 TABLET | Refills: 0 | Status: SHIPPED | OUTPATIENT
Start: 2022-02-03

## 2022-02-03 RX ORDER — ASCORBIC ACID 500 MG
500 TABLET ORAL DAILY
Qty: 10 TABLET | Refills: 0 | Status: SHIPPED | OUTPATIENT
Start: 2022-02-03 | End: 2022-03-29

## 2022-02-03 RX ORDER — FUROSEMIDE 40 MG/1
40 TABLET ORAL DAILY
Qty: 40 TABLET | Refills: 0 | Status: SHIPPED | OUTPATIENT
Start: 2022-02-03

## 2022-02-03 RX ORDER — ALBUTEROL SULFATE 90 UG/1
2 AEROSOL, METERED RESPIRATORY (INHALATION) EVERY 6 HOURS PRN
Start: 2022-02-03 | End: 2023-01-23

## 2022-02-03 RX ORDER — METOLAZONE 2.5 MG/1
2.5 TABLET ORAL DAILY
Qty: 15 TABLET | Refills: 0 | Status: SHIPPED | OUTPATIENT
Start: 2022-02-03

## 2022-02-03 RX ADMIN — BUSPIRONE HYDROCHLORIDE 5 MG: 5 TABLET ORAL at 07:51

## 2022-02-03 RX ADMIN — PANTOPRAZOLE SODIUM 40 MG: 40 TABLET, DELAYED RELEASE ORAL at 07:50

## 2022-02-03 RX ADMIN — SERTRALINE 100 MG: 100 TABLET, FILM COATED ORAL at 07:51

## 2022-02-03 RX ADMIN — ZINC SULFATE 220 MG (50 MG) CAPSULE 220 MG: CAPSULE at 07:50

## 2022-02-03 RX ADMIN — LEVOTHYROXINE SODIUM 150 MCG: 0.15 TABLET ORAL at 05:59

## 2022-02-03 RX ADMIN — FUROSEMIDE 40 MG: 10 INJECTION, SOLUTION INTRAMUSCULAR; INTRAVENOUS at 06:00

## 2022-02-03 RX ADMIN — LISINOPRIL 5 MG: 5 TABLET ORAL at 07:50

## 2022-02-03 RX ADMIN — METOPROLOL TARTRATE 25 MG: 25 TABLET, FILM COATED ORAL at 07:50

## 2022-02-03 RX ADMIN — Medication 10000 UNITS: at 07:50

## 2022-02-03 RX ADMIN — SODIUM CHLORIDE, PRESERVATIVE FREE 10 ML: 5 INJECTION INTRAVENOUS at 07:53

## 2022-02-03 RX ADMIN — ENOXAPARIN SODIUM 40 MG: 40 INJECTION SUBCUTANEOUS at 05:59

## 2022-02-03 RX ADMIN — OXYCODONE HYDROCHLORIDE AND ACETAMINOPHEN 500 MG: 500 TABLET ORAL at 07:51

## 2022-02-03 NOTE — PLAN OF CARE
Problem: Adult Inpatient Plan of Care  Goal: Plan of Care Review  Outcome: Ongoing, Progressing  Flowsheets (Taken 2/3/2022 0605)  Progress: improving  Plan of Care Reviewed With: patient  Outcome Summary: plan for DC today possibly   Goal Outcome Evaluation:  Plan of Care Reviewed With: patient        Progress: improving  Outcome Summary: plan for DC today possibly

## 2022-02-03 NOTE — DISCHARGE SUMMARY
[facility] Medicine Services  DISCHARGE SUMMARY       Date of Admission: 2/1/2022  Date of Discharge:  2/3/2022  Primary Care Physician: Karolina Toney APRN    Discharge Diagnoses:  Active Hospital Problems    Diagnosis    • **Fluid overload    • Acute pulmonary edema (HCC)    • Hypertensive heart disease with acute diastolic congestive heart failure (HCC)    • Type 2 diabetes mellitus with complication, with long-term current use of insulin (HCC)    • COVID-19 virus detected    • HLD (hyperlipidemia)    • Former smoker    • Hypertension    • Class 3 severe obesity due to excess calories with serious comorbidity and body mass index (BMI) of 40.0 to 44.9 in adult (HCC)    • Hypothyroid    in addition to above:  Mediastinal LN - need follow up c/o PCP; possibly reactive from reported hx of COVID.   History of coronary artery disease with prior coronary artery bypass graft      Presenting Problem/History of Present Illness:  Acute pulmonary edema (HCC) [J81.0]         Hospital Course  She is a 52-year-old woman who I admitted on February 1 presenting with shortness of breath, increasing swelling, paroxysmal nocturnal dyspnea.  In the recent past, her primary care provider added Lasix once daily and minoxidil to her regimen.  Her diagnostic studies showed elevated proBNP, D-dimer.  Her chest x-ray showed cardiomegaly with bilateral interstitial and airspace opacity favoring moderate pulmonary edema but infectious process also within the differential.  Patient tested positive for Covid.  Follow-up CPE angiogram chest imaging study showed no evidence of pulmonary embolus, atheromatous disease of the thoracic aorta and coronary arteries with cardiomegaly.  There is focal consolidation in the left upper lobe/lingula and left lower lobe which atelectasis versus pneumonia considered.  She also has small bilateral pleural effusion.  She has mediastinal lymphadenopathy which radiologist  felt could be reactive or neoplastic.    I subsequently learned that patient recently had Covid 19 infection and what we could be seeing is it is scheduled off it.  Otherwise she was not requiring any oxygen during this hospitalization.  On my examination, I noted that she has significant pitting edema of lower extremities.  Her lung sounds are clear.  I diuresed her.  She has negative fluid balance and felt significantly improved.  She would like to go home today.  She has a clinic appointment with her cardiologist tomorrow.    She is diabetic.  She has been on insulin pump.  She has had low blood sugar which was adequately managed.  Given heart failure consideration for SGLT2 was placed.  I will defer this to her primary care provider.  From my understanding this medication is primarily used for patients with heart failure with reduced ejection fraction.  Otherwise her echocardiogram showed normal EF.    In managing her fluid overload, I will continue her on Lasix 40 twice daily, added metolazone 2.5 mg daily.  This will replace minoxidil.  She needs a follow-up basic metabolic panel.  I advised her to weigh herself daily and monitor blood pressure closely.  I switch her to carvedilol for better control of blood pressure.   She will continue on ACE inhibitor.  Other plans as per orders.    She verbalized adequate understanding and changes made on medication and the importance of close follow-up including blood work.    Patient medically stable and appropriate for discharge    Procedures Performed:   none    Consults:   None    Pertinent Test Results:   Lab Results (last 72 hours)     Procedure Component Value Units Date/Time    POC Glucose Once [[id number]]  (Abnormal) Collected: 02/03/22 0740    Specimen: Blood Updated: 02/03/22 0751     Glucose 159 mg/dL      Comment: : [id number] Esa AlarconBrent ID: [id number]       Basic Metabolic Panel [[id number]]  (Abnormal) Collected: 02/03/22 0348    Specimen: Blood  Updated: 02/03/22 0429     Glucose 302 mg/dL      BUN 19 mg/dL      Creatinine 0.72 mg/dL      Sodium 142 mmol/L      Potassium 4.1 mmol/L      Chloride 105 mmol/L      CO2 29.0 mmol/L      Calcium 8.3 mg/dL      eGFR Non African Amer 85 mL/min/1.73      BUN/Creatinine Ratio 26.4     Anion Gap 8.0 mmol/L     Narrative:      GFR Normal >60  Chronic Kidney Disease <60  Kidney Failure <15      POC Glucose Once [[id number]]  (Normal) Collected: 02/02/22 2054    Specimen: Blood Updated: 02/02/22 2105     Glucose 99 mg/dL      Comment: : [id number] Georges Barfield GMeter ID: [id number]       Basic Metabolic Panel [[id number]]  (Abnormal) Collected: 02/02/22 0459    Specimen: Blood Updated: 02/02/22 1845     Glucose 105 mg/dL      BUN 19 mg/dL      Creatinine 0.63 mg/dL      Sodium 141 mmol/L      Potassium 3.8 mmol/L      Chloride 105 mmol/L      CO2 24.0 mmol/L      Calcium 8.9 mg/dL      eGFR Non African Amer 100 mL/min/1.73      BUN/Creatinine Ratio 30.2     Anion Gap 12.0 mmol/L     Narrative:      GFR Normal >60  Chronic Kidney Disease <60  Kidney Failure <15      POC Glucose Once [[id number]]  (Normal) Collected: 02/02/22 1701    Specimen: Blood Updated: 02/02/22 1712     Glucose 98 mg/dL      Comment: : [id number] Nadira HeatherMeter ID: [id number]       POC Glucose Once [[id number]]  (Abnormal) Collected: 02/02/22 1149    Specimen: Blood Updated: 02/02/22 1200     Glucose 68 mg/dL      Comment: : [id number] Nadira HeatherMeter ID: [id number]       POC Glucose Once [[id number]]  (Abnormal) Collected: 02/02/22 1118    Specimen: Blood Updated: 02/02/22 1130     Glucose 56 mg/dL      Comment: : [id number] Nadira HeatherMeter ID: [id number]       POC Glucose Once [[id number]]  (Normal) Collected: 02/02/22 0721    Specimen: Blood Updated: 02/02/22 0733     Glucose 83 mg/dL      Comment: : [id number] Nadira HeatherMeter ID: [id number]       C-reactive Protein [[id number]]  (Abnormal) Collected: 02/02/22 0454     Specimen: Blood Updated: 02/02/22 0639     C-Reactive Protein 0.53 mg/dL     Ferritin [[id number]]  (Abnormal) Collected: 02/02/22 0459    Specimen: Blood Updated: 02/02/22 0639     Ferritin 157.80 ng/mL     Narrative:      Results may be falsely decreased if patient taking Biotin.      CBC & Differential [[id number]]  (Abnormal) Collected: 02/02/22 0459    Specimen: Blood Updated: 02/02/22 0612    Narrative:      The following orders were created for panel order CBC & Differential.  Procedure                               Abnormality         Status                     ---------                               -----------         ------                     CBC Auto Differential[[id number]]        Abnormal            Final result                 Please view results for these tests on the individual orders.    CBC Auto Differential [[id number]]  (Abnormal) Collected: 02/02/22 0459    Specimen: Blood Updated: 02/02/22 0612     WBC 6.22 10*3/mm3      RBC 4.89 10*6/mm3      Hemoglobin 11.8 g/dL      Hematocrit 38.6 %      MCV 78.9 fL      MCH 24.1 pg      MCHC 30.6 g/dL      RDW 15.0 %      RDW-SD 43.1 fl      MPV 10.2 fL      Platelets 402 10*3/mm3      Neutrophil % 56.6 %      Lymphocyte % 29.7 %      Monocyte % 8.5 %      Eosinophil % 4.3 %      Basophil % 0.6 %      Immature Grans % 0.3 %      Neutrophils, Absolute 3.51 10*3/mm3      Lymphocytes, Absolute 1.85 10*3/mm3      Monocytes, Absolute 0.53 10*3/mm3      Eosinophils, Absolute 0.27 10*3/mm3      Basophils, Absolute 0.04 10*3/mm3      Immature Grans, Absolute 0.02 10*3/mm3      nRBC 0.0 /100 WBC     Procalcitonin [[id number]]  (Normal) Collected: 02/01/22 1349    Specimen: Blood Updated: 02/01/22 1821     Procalcitonin <0.02 ng/mL     Narrative:      As a Marker for Sepsis (Non-Neonates):     1. <0.5 ng/mL represents a low risk of severe sepsis and/or septic shock.  2. >2 ng/mL represents a high risk of severe sepsis and/or septic shock.    As a Marker for Lower  "Respiratory Tract Infections that require antibiotic therapy:  PCT on Admission     Antibiotic Therapy             6-12 Hrs later  >0.5                          Strongly Recommended            >0.25 - <0.5             Recommended  0.1 - 0.25                  Discouraged                       Remeasure/reassess PCT  <0.1                         Strongly Discouraged         Remeasure/reassess PCT      As 28 day mortality risk marker: \"Change in Procalcitonin Result\" (>80% or <=80%) if Day 0 (or Day 1) and Day 4 values are available. Refer to http://www.[employer]ct-calculator.com/    Change in PCT <=80 %   A decrease of PCT levels below or equal to 80% defines a positive change in PCT test result representing a higher risk for 28-day all-cause mortality of patients diagnosed with severe sepsis or septic shock.    Change in PCT >80 %   A decrease of PCT levels of more than 80% defines a negative change in PCT result representing a lower risk for 28-day all-cause mortality of patients diagnosed with severe sepsis or septic shock.                Lipid Panel [[id number]]  (Abnormal) Collected: 02/01/22 1349    Specimen: Blood Updated: 02/01/22 1820     Total Cholesterol 120 mg/dL      Triglycerides 172 mg/dL      HDL Cholesterol 46 mg/dL      LDL Cholesterol  46 mg/dL      VLDL Cholesterol 28 mg/dL      LDL/HDL Ratio 0.86    Narrative:      Cholesterol Reference Ranges  (U.S. Department of Health and Human Services ATP III Classifications)    Desirable          <200 mg/dL  Borderline High    200-239 mg/dL  High Risk          >240 mg/dL      Triglyceride Reference Ranges  (U.S. Department of Health and Human Services ATP III Classifications)    Normal           <150 mg/dL  Borderline High  150-199 mg/dL  High             200-499 mg/dL  Very High        >500 mg/dL    HDL Reference Ranges  (U.S. Department of Health and Human Services ATP III Classifications)    Low     <40 mg/dl (major risk factor for CHD)  High    >60 mg/dl " ('negative' risk factor for CHD)        LDL Reference Ranges  (U.S. Department of Health and Human Services ATP III Classifications)    Optimal          <100 mg/dL  Near Optimal     100-129 mg/dL  Borderline High  130-159 mg/dL  High             160-189 mg/dL  Very High        >189 mg/dL    Ferritin [[id number]]  (Normal) Collected: 02/01/22 1349    Specimen: Blood Updated: 02/01/22 1820     Ferritin 131.70 ng/mL     Narrative:      Results may be falsely decreased if patient taking Biotin.      C-reactive Protein [[id number]]  (Normal) Collected: 02/01/22 1349    Specimen: Blood Updated: 02/01/22 1820     C-Reactive Protein 0.44 mg/dL     TSH [[id number]]  (Normal) Collected: 02/01/22 1349    Specimen: Blood Updated: 02/01/22 1820     TSH 2.870 uIU/mL     Hemoglobin A1c [[id number]]  (Abnormal) Collected: 02/01/22 1349    Specimen: Blood Updated: 02/01/22 1811     Hemoglobin A1C 8.10 %     Narrative:      Hemoglobin A1C Ranges:    Increased Risk for Diabetes  5.7% to 6.4%  Diabetes                     >= 6.5%  Diabetic Goal                < 7.0%    COVID PRE-OP / PRE-PROCEDURE SCREENING ORDER (NO ISOLATION) - Swab, Nasal Cavity [[id number]]  (Abnormal) Collected: 02/01/22 1345    Specimen: Swab from Nasal Cavity Updated: 02/01/22 1445    Narrative:      The following orders were created for panel order COVID PRE-OP / PRE-PROCEDURE SCREENING ORDER (NO ISOLATION) - Swab, Nasal Cavity.  Procedure                               Abnormality         Status                     ---------                               -----------         ------                     COVID-19,[facility] [employer] IN-NATHALIE...[[id number]]  Abnormal            Final result                 Please view results for these tests on the individual orders.    COVID-19,[facility] [employer] IN-HOUSE,Nasal Swab No Transport Media 3-4 HR TAT - Swab, Nasal Cavity [[id number]]  (Abnormal) Collected: 02/01/22 1345    Specimen: Swab from Nasal Cavity Updated: 02/01/22 1445     COVID19 Detected     Narrative:      Fact sheet for providers: https://www.fda.gov/media/120688/download     Fact sheet for patients: https://www.fda.gov/media/623121/download    Test performed by PCR.    Consider negative results in combination with clinical observations, patient history, and epidemiological information.    Comprehensive Metabolic Panel [[id number]]  (Abnormal) Collected: 02/01/22 1349    Specimen: Blood Updated: 02/01/22 1422     Glucose 72 mg/dL      BUN 21 mg/dL      Creatinine 0.81 mg/dL      Sodium 143 mmol/L      Potassium 3.7 mmol/L      Comment: Slight hemolysis detected by analyzer. Results may be affected.        Chloride 105 mmol/L      CO2 27.0 mmol/L      Calcium 9.2 mg/dL      Total Protein 7.6 g/dL      Albumin 3.90 g/dL      ALT (SGPT) 23 U/L      AST (SGOT) 22 U/L      Alkaline Phosphatase 121 U/L      Total Bilirubin 0.4 mg/dL      eGFR Non African Amer 75 mL/min/1.73      Globulin 3.7 gm/dL      A/G Ratio 1.1 g/dL      BUN/Creatinine Ratio 25.9     Anion Gap 11.0 mmol/L     Narrative:      GFR Normal >60  Chronic Kidney Disease <60  Kidney Failure <15      Troponin [[id number]]  (Normal) Collected: 02/01/22 1349    Specimen: Blood Updated: 02/01/22 1420     Troponin T <0.010 ng/mL     Narrative:      Troponin T Reference Range:  <= 0.03 ng/mL-   Negative for AMI  >0.03 ng/mL-     Abnormal for myocardial necrosis.  Clinicians would have to utilize clinical acumen, EKG, Troponin and serial changes to determine if it is an Acute Myocardial Infarction or myocardial injury due to an underlying chronic condition.       Results may be falsely decreased if patient taking Biotin.      BNP [[id number]]  (Abnormal) Collected: 02/01/22 1349    Specimen: Blood Updated: 02/01/22 1419     proBNP 2,238.0 pg/mL     Narrative:      Among patients with dyspnea, NT-proBNP is highly sensitive for the detection of acute congestive heart failure. In addition NT-proBNP of <300 pg/ml effectively rules out acute congestive  heart failure with 99% negative predictive value.    Results may be falsely decreased if patient taking Biotin.      D-dimer, Quantitative [[id number]]  (Abnormal) Collected: 02/01/22 1349    Specimen: Blood Updated: 02/01/22 1417     D-Dimer, Quantitative 0.78 mg/L (FEU)     Narrative:      Reference Range is 0-0.50 mg/L FEU. However, results <0.50 mg/L FEU tends to rule out DVT or PE. Results >0.50 mg/L FEU are not useful in predicting absence or presence of DVT or PE.      Protime-INR [[id number]]  (Normal) Collected: 02/01/22 1349    Specimen: Blood Updated: 02/01/22 1415     Protime 12.4 Seconds      INR 0.96    aPTT [[id number]]  (Normal) Collected: 02/01/22 1349    Specimen: Blood Updated: 02/01/22 1415     PTT 28.6 seconds     CBC & Differential [[id number]]  (Abnormal) Collected: 02/01/22 1349    Specimen: Blood Updated: 02/01/22 1402    Narrative:      The following orders were created for panel order CBC & Differential.  Procedure                               Abnormality         Status                     ---------                               -----------         ------                     CBC Auto Differential[[id number]]        Abnormal            Final result                 Please view results for these tests on the individual orders.    CBC Auto Differential [[id number]]  (Abnormal) Collected: 02/01/22 1349    Specimen: Blood Updated: 02/01/22 1402     WBC 9.24 10*3/mm3      RBC 5.20 10*6/mm3      Hemoglobin 12.7 g/dL      Hematocrit 41.3 %      MCV 79.4 fL      MCH 24.4 pg      MCHC 30.8 g/dL      RDW 15.3 %      RDW-SD 44.4 fl      MPV 10.0 fL      Platelets 450 10*3/mm3      Neutrophil % 61.2 %      Lymphocyte % 27.1 %      Monocyte % 7.5 %      Eosinophil % 3.4 %      Basophil % 0.5 %      Immature Grans % 0.3 %      Neutrophils, Absolute 5.66 10*3/mm3      Lymphocytes, Absolute 2.50 10*3/mm3      Monocytes, Absolute 0.69 10*3/mm3      Eosinophils, Absolute 0.31 10*3/mm3      Basophils, Absolute 0.05  10*3/mm3      Immature Grans, Absolute 0.03 10*3/mm3      nRBC 0.0 /100 WBC         Imaging Results (Last 7 Days)     Procedure Component Value Units Date/Time    CT Angiogram Chest With & Without Contrast [[id number]] Collected: 02/01/22 1849     Updated: 02/01/22 1857    Narrative:      EXAMINATION:  CT ANGIOGRAM CHEST W WO CONTRAST-  2/1/2022 6:27 PM CST     HISTORY: r/o pe; covid +, elevated d dimer, sob; J81.0-Acute pulmonary  edema; I50.9-Heart failure, unspecified; U07.1-COVID-19     COMPARISON : 3/18/2021.     DLP: 1041 mGy-cm. Automated dosage reduction technique was utilized to  decrease patient dosage.     TECHNIQUE: CT angio was performed of the chest with contrast. Coronal,  sagittal and 3-D reconstruction were performed.     MEDIASTINUM, HEART AND VASCULAR STRUCTURES: There is atheromatous  disease of the thoracic aorta and coronary arteries. There is  cardiomegaly. There are multiple mediastinal lymph nodes. The largest in  the anterior mediastinum has a short axis diameter of 1.8 cm. There are  small lymph nodes in the axillary regions left greater than right. No  definite pulmonary embolus is seen. Some of the small peripheral  branches are difficult to evaluate due to breathing motion artifact.     LUNGS: There is focal consolidation in the left upper lobe laterally.  There is mild peripheral infiltrate in the left lower lobe. There are  small pleural effusions right greater than left. There is mild mosaic  attenuation likely related to poor inspiratory effort.     UPPER ABDOMEN: There is a small midline upper abdominal wall  fat-containing hernia anteriorly measuring 2.5 cm transversely on image  110 series 8.     BONES: There has been prior median sternotomy. There are degenerative  changes of the spine.       Impression:      1. No CT evidence of pulmonary embolus. Peripheral vessels difficult to  fully evaluate due to breathing motion artifact.  2. Atheromatous disease of the thoracic aorta and  "coronary arteries.  Cardiomegaly.  3. Mediastinal lymphadenopathy with small hilar and axillary lymph nodes  as well. The lymph nodes may be reactive or neoplastic.  4. Focal areas of consolidation in the left upper lobe/lingula and left  lower lobe. Atelectasis versus pneumonia.  5. Small bilateral pleural effusions.  6. Other nonacute findings, as discussed above.    This report was finalized on 02/01/2022 18:54 by Dr. Blade Calles MD.    XR Chest 1 View [[id number]] Collected: 02/01/22 1410     Updated: 02/01/22 1415    Narrative:      EXAM: XR CHEST 1 VW-     INDICATION: Shortness of air     COMPARISON: 3/22/2021     FINDINGS:     Cardiac silhouette is enlarged and increased in size from prior study.  No large pleural effusion or visible pneumothorax. Bilateral diffuse  interstitial and airspace opacity greatest in the RIGHT middle and lower  lung zones. Prior median sternotomy. No acute osseous finding.       Impression:         Cardiomegaly and bilateral interstitial and airspace opacity. Favor  moderate pulmonary edema but infectious process is also within the  differential.  This report was finalized on 02/01/2022 14:12 by Dr. Ken Vaughan MD.          Condition on Discharge:  stable    Physical Exam on Discharge:  /78 (BP Location: Left arm, Patient Position: Sitting)   Pulse 61   Temp 97.9 °F (36.6 °C) (Oral)   Resp 18   Ht 157.5 cm (62\")   Wt 116 kg (256 lb 3.2 oz)   LMP 03/17/2020 (Approximate)   SpO2 99%   BMI 46.86 kg/m²   Physical Exam  Morbidly obese  Pleasant woman  No distress  Elevated blood pressure noted  O2 saturation 100% in room air  No accessory muscle use  Nonlabored breathing at time of examination  Speaks in full sentences  GEN: Awake, alert, interactive, in NAD  HEENT: Atraumatic, PERRLA, EOMI, Anicteric, Trachea midline  Lungs: CTAB, no wheezing/rales/rhonchi  Heart: RRR, +S1/s2, no rub  ABD: soft, nt/nd, +BS, no guarding/rebound  Extremities: atraumatic, no " cyanosis, positive pitting edema bilateral lower extremities - improving; weight 256.2lbs  Skin: no rashes or lesions  Neuro: AAOx3, no focal deficits  Psych: normal mood & affect    Discharge Disposition:  Home or Self Care    Discharge Medications:     Discharge Medications      New Medications      Instructions Start Date   albuterol sulfate  (90 Base) MCG/ACT inhaler  Commonly known as: PROVENTIL HFA;VENTOLIN HFA;PROAIR HFA   2 puffs, Inhalation, Every 6 Hours PRN      ascorbic acid 500 MG tablet  Commonly known as: VITAMIN C   500 mg, Oral, Daily      carvedilol 3.125 MG tablet  Commonly known as: Coreg   3.125 mg, Oral, 2 Times Daily With Meals      metOLazone 2.5 MG tablet  Commonly known as: ZAROXOLYN   2.5 mg, Oral, Daily      zinc sulfate 220 (50 Zn) MG capsule  Commonly known as: ZINCATE   220 mg, Oral, Daily         Continue These Medications      Instructions Start Date   aspirin 81 MG chewable tablet   81 mg, Oral, Daily      atorvastatin 80 MG tablet  Commonly known as: LIPITOR   80 mg, Oral, Nightly      busPIRone 5 MG tablet  Commonly known as: BUSPAR   5 mg, Oral, 2 Times Daily      clopidogrel 75 MG tablet  Commonly known as: PLAVIX   75 mg, Oral, Nightly      furosemide 40 MG tablet  Commonly known as: LASIX   40 mg, Oral, Daily      insulin lispro 100 UNIT/ML injection  Commonly known as: humaLOG   Insulin Pump      levothyroxine 150 MCG tablet  Commonly known as: SYNTHROID, LEVOTHROID   150 mcg, Oral, Daily      lisinopril 20 MG tablet  Commonly known as: PRINIVIL,ZESTRIL   20 mg, Oral, 2 Times Daily      sertraline 100 MG tablet  Commonly known as: ZOLOFT   100 mg, Oral, Daily      vitamin D3 125 MCG (5000 UT) capsule capsule   10,000 Units, Oral, Daily         Stop These Medications    metoprolol tartrate 25 MG tablet  Commonly known as: LOPRESSOR     minoxidil 10 MG tablet  Commonly known as: LONITEN            Discharge Diet:   Diet Instructions     Diet: Specialty Diet, Consistent  Carbohydrate; Thin Liquids, No Restrictions; Low Sodium      Discharge Diet:  Specialty Diet  Consistent Carbohydrate       Fluid Consistency: Thin Liquids, No Restrictions    Specialty Diets: Low Sodium          Discharge Care Plan / Instructions:  Daily weight   Monitor BP  Hypoglycemia precautionary measure    Activity at Discharge:   Activity Instructions     Gradually Increase Activity Until at Pre-Hospitalization Level      Measure Weight Daily      Instruct patient on daily weights          Follow-up Appointments:   PCP within 1 wk with BMP  Keep appt with cardiology tomorrow - ? Use of SGLT   Keep appt. With Dr. Thomas in March   Test Results Pending at Discharge:      Electronically signed by Carlos Saul MD, 2/3/2022, 11:31 CST.    Time: > 30 mins        Part of this note may be an electronic transcription/translation of spoken language to printed text using the Dragon Dictation System.

## 2022-02-03 NOTE — PLAN OF CARE
Goal Outcome Evaluation:  Plan of Care Reviewed With: patient        Progress: improving  Outcome Summary: No resp difficulty edema better no o2 needed , up ad rachel , b/s 99  pt cont on insulin pump , no c/o pain  , cont to monitor , poss d/c home today .

## 2022-02-03 NOTE — PROGRESS NOTES
[facility] Medicine Services  INPATIENT PROGRESS NOTE    Patient Name: Krys Cox  Date of Admission: 2/1/2022  Today's Date: 02/02/22  Length of Stay: 0  Primary Care Physician: Karolina Toney APRN    Subjective   Chief Complaint: Follow-up  HPI   I learned that patient had prior Covid early part of January.  She completed her isolation from January 5 through January 15, 2022..  I did not see any record of this in our system.  She told me she did not need to be hospitalized for it.  She remain positive for Covid  I admitted her yesterday for fluid overload/pulmonary edema and suspected congestive heart failure.  When she came in she had elevated blood pressure of lower extremities.  She told me that she was prescribed Lasix and minoxidil in the recent past.    Echocardiogram today showed normal EF, presence of diastolic dysfunction  Results for orders placed during the hospital encounter of 02/01/22    Adult Transthoracic Echo Complete W/ Cont if Necessary Per Protocol    Interpretation Summary  · Left ventricular ejection fraction appears to be 56 - 60%.  · Left ventricular wall thickness is consistent with mild concentric hypertrophy.  · Left ventricular diastolic function is consistent with (grade II w/high LAP) pseudonormalization.  · Left atrial volume is mildly increased.    She states she is feeling better.  This is the first time in 3 weeks that she was able to sleep straight without waking up short of breath.  She is acknowledges that she still is swollen.  She is hoping to go home soon  She has negative fluid balance since on diuretic.    CT angiogram of the chest did not show evidence of pulmonary embolism  Review of Systems     All pertinent negatives and p pulmonary embolism ositives are as above. All other systems have been reviewed and are negative unless otherwise stated.     Objective    Temp:  [97 °F (36.1 °C)-97.8 °F (36.6 °C)] 97.6 °F (36.4  °C)  Heart Rate:  [71-81] 74  Resp:  [16-20] 16  BP: (134-170)/(54-76) 147/54  Physical Exam  Morbidly obese  Pleasant woman  No distress  Elevated blood pressure noted  O2 saturation 100% in room air  No accessory muscle use  Nonlabored breathing at time of examination  Speaks in full sentences  GEN: Awake, alert, interactive, in NAD  HEENT: Atraumatic, PERRLA, EOMI, Anicteric, Trachea midline  Lungs: CTAB, no wheezing/rales/rhonchi  Heart: RRR, +S1/s2, no rub  ABD: soft, nt/nd, +BS, no guarding/rebound  Extremities: atraumatic, no cyanosis, positive pitting edema bilateral lower extremities  Skin: no rashes or lesions  Neuro: AAOx3, no focal deficits  Psych: normal mood & affect         Results Review:  I have reviewed the labs, radiology results, and diagnostic studies.    Laboratory Data:   Results from last 7 days   Lab Units 02/02/22  0459 02/01/22  1349   WBC 10*3/mm3 6.22 9.24   HEMOGLOBIN g/dL 11.8* 12.7   HEMATOCRIT % 38.6 41.3   PLATELETS 10*3/mm3 402 450        Results from last 7 days   Lab Units 02/01/22  1349   SODIUM mmol/L 143   POTASSIUM mmol/L 3.7   CHLORIDE mmol/L 105   CO2 mmol/L 27.0   BUN mg/dL 21*   CREATININE mg/dL 0.81   CALCIUM mg/dL 9.2   BILIRUBIN mg/dL 0.4   ALK PHOS U/L 121*   ALT (SGPT) U/L 23   AST (SGOT) U/L 22   GLUCOSE mg/dL 72     COVID LABS:  Results From Last 14 Days   Lab Units 02/02/22  0459 02/01/22  1349   PROBNP pg/mL  --  2,238.0*   CRP mg/dL 0.53* 0.44   D DIMER QUANT mg/L (FEU)  --  0.78*   FERRITIN ng/mL 157.80* 131.70   PROCALCITONIN ng/mL  --  <0.02   PROTIME Seconds  --  12.4   INR   --  0.96   TROPONIN T ng/mL  --  <0.010   TRIGLYCERIDES mg/dL  --  172*         Culture Data:   No results found for: BLOODCX, URINECX, WOUNDCX, MRSACX, RESPCX, STOOLCX    Radiology Data:   Imaging Results (Last 24 Hours)     Procedure Component Value Units Date/Time    CT Angiogram Chest With & Without Contrast [[id number]] Collected: 02/01/22 1849     Updated: 02/01/22 2251     Narrative:      EXAMINATION:  CT ANGIOGRAM CHEST W WO CONTRAST-  2/1/2022 6:27 PM CST     HISTORY: r/o pe; covid +, elevated d dimer, sob; J81.0-Acute pulmonary  edema; I50.9-Heart failure, unspecified; U07.1-COVID-19     COMPARISON : 3/18/2021.     DLP: 1041 mGy-cm. Automated dosage reduction technique was utilized to  decrease patient dosage.     TECHNIQUE: CT angio was performed of the chest with contrast. Coronal,  sagittal and 3-D reconstruction were performed.     MEDIASTINUM, HEART AND VASCULAR STRUCTURES: There is atheromatous  disease of the thoracic aorta and coronary arteries. There is  cardiomegaly. There are multiple mediastinal lymph nodes. The largest in  the anterior mediastinum has a short axis diameter of 1.8 cm. There are  small lymph nodes in the axillary regions left greater than right. No  definite pulmonary embolus is seen. Some of the small peripheral  branches are difficult to evaluate due to breathing motion artifact.     LUNGS: There is focal consolidation in the left upper lobe laterally.  There is mild peripheral infiltrate in the left lower lobe. There are  small pleural effusions right greater than left. There is mild mosaic  attenuation likely related to poor inspiratory effort.     UPPER ABDOMEN: There is a small midline upper abdominal wall  fat-containing hernia anteriorly measuring 2.5 cm transversely on image  110 series 8.     BONES: There has been prior median sternotomy. There are degenerative  changes of the spine.       Impression:      1. No CT evidence of pulmonary embolus. Peripheral vessels difficult to  fully evaluate due to breathing motion artifact.  2. Atheromatous disease of the thoracic aorta and coronary arteries.  Cardiomegaly.  3. Mediastinal lymphadenopathy with small hilar and axillary lymph nodes  as well. The lymph nodes may be reactive or neoplastic.  4. Focal areas of consolidation in the left upper lobe/lingula and left  lower lobe. Atelectasis versus  pneumonia.  5. Small bilateral pleural effusions.  6. Other nonacute findings, as discussed above.    This report was finalized on 02/01/2022 18:54 by Dr. Blade Calles MD.          I have reviewed the patient's current medications.     Assessment/Plan     Active Hospital Problems    Diagnosis    • Acute pulmonary edema (HCC)    • COVID-19 virus detected      Problem list  Fluid overload/pulmonary edema/acute diastolic  congestive heart failure prob from HTN   Covid positive with chest x-ray of pulmonary edema and/or Covid pneumonia  Cough likely related to COVID-19 infection  Hypertension  CAD with prior cabg in March 2021-stable  Diabetes mellitus with insulin pump -basal insulin is 2 units/h with bolus each meal depending on sugar; aic 8.1%/hypoglycemia  Hyperlipidemia  Tobacco abuse in remission  Hypothyroidism  Elevated D-dimer in the setting of COVID-19 positive  Mediastinal lymphadenopathy.  Active but neoplasm cannot be totally ruled per radiologist.  Need to monitor in the outpatient setting      Plan:  Patient can be taken off isolation. She has no fever.  Her symptoms can be explained by other diagnoses as stated on top most item.  She has no's signs and symptoms suggestive of secondary bacterial infection.  Procalcitonin is 0.02.    Continue IV diuresis  Monitor fluid, electrolyte, weight and renal function    As needed D50 for hypoglycemia.  Patient using her own insulin pump.  Diabetic educator.  Discussed observed hypoglycemia.  Precautionary measure in place    Informed about lymphadenopathy.    Discussed with nurse Anabella    ascorbic acid, 500 mg, Oral, Daily  aspirin, 81 mg, Oral, Nightly  atorvastatin, 80 mg, Oral, Nightly  busPIRone, 5 mg, Oral, BID  clopidogrel, 75 mg, Oral, Nightly  enoxaparin, 40 mg, Subcutaneous, Q12H  furosemide, 40 mg, Intravenous, Q12H  levothyroxine, 150 mcg, Oral, Q AM  lisinopril, 5 mg, Oral, Daily  metoprolol tartrate, 25 mg, Oral, BID  pantoprazole, 40 mg, Oral,  Daily  sertraline, 100 mg, Oral, Daily  sodium chloride, 10 mL, Intravenous, Q12H  vitamin D3, 10,000 Units, Oral, Daily  zinc sulfate, 220 mg, Oral, Daily          Discharge Planning: Probably home by tomorrow.    Electronically signed by Carlos Saul MD, 02/02/22, 18:02 CST.  \

## 2022-02-04 ENCOUNTER — READMISSION MANAGEMENT (OUTPATIENT)
Dept: CALL CENTER | Facility: HOSPITAL | Age: 52
End: 2022-02-04

## 2022-02-04 NOTE — OUTREACH NOTE
COVID-19 Week 1 Survey      Responses   [facility] patient discharged from? [place]   Does the patient have one of the following disease processes/diagnoses(primary or secondary)? COVID-19   COVID-19 underlying condition? None   Call Number Call 1   Week 1 Call successful? Yes   Call start time 1214   Call end time 1231   Discharge diagnosis acute pulmonary edmea/Covid 19   Meds reviewed with patient/caregiver? Yes   Is the patient having any side effects they believe may be caused by any medication additions or changes? No   Does the patient have all medications ordered at discharge? No   What is keeping the patient from filling the prescriptions? Lost script/didn't receive   Prescription comments did not get the albuterol sulfate HFA or vitamin C- will send message to case management   Is the patient taking all medications as directed (includes completed medication regime)? Yes   Comments regarding appointments f/u appt with cardiology was cancelled today due to weather- they will reschedule   Does the patient have a primary care provider?  Yes   Comments regarding PCP PCP office is closed today- will have to call Monday   Does the patient have an appointment with their PCP or specialist within 7 days of discharge? No   Nursing Interventions Advised patient to make appointment   Has the patient kept scheduled appointments due by today? N/A   Has home health visited the patient within 72 hours of discharge? N/A   Psychosocial issues? No   Did the patient receive a copy of their discharge instructions? Yes   Did the patient receive a copy of COVID-19 specific instructions? Yes   Nursing interventions Reviewed instructions with patient   What is the patient's perception of their health status since discharge? Improving   Does the patient have any of the following symptoms? Cough   Nursing Interventions Nurse provided patient education   Pulse Ox monitoring None  [does not have a pulse oximeter]   Is the  patient/caregiver able to teach back steps to recovery at home? Set small, achievable goals for return to baseline health,  Rest and rebuild strength, gradually increase activity   Is the patient/caregiver able to teach back the hierarchy of who to call/visit for symptoms/problems? PCP, Specialist, Home health nurse, Urgent Care, ED, 911 Yes   COVID-19 call completed? Yes   Wrap up additional comments Says she is doing well, long discussion about her medications and how her lasix is prescribed, she is going to call Monday to make her f/u appt and get clarification from MD about her lasix order.          Rekha Cuevas RN

## 2022-02-04 NOTE — OUTREACH NOTE
Prep Survey      Responses   [facility] facility patient discharged from? [place]   Is LACE score < 7 ? No   Emergency Room discharge w/ pulse ox? No   Eligibility Readm Mgmt   Discharge diagnosis acute pulmonary edmea/Covid 19   Does the patient have one of the following disease processes/diagnoses(primary or secondary)? COVID-19   Does the patient have Home health ordered? No   Is there a DME ordered? No   Prep survey completed? Yes          Kimberlee Walsh RN

## 2022-02-04 NOTE — PAYOR COMM NOTE
"REF; [id number]    [facility]  [place],  [phone]  OR  FAX  [phone]           Krys Cox (52 y.o. Female)             Date of Birth Social Security Number Address Home Phone MRN    1970  [address] [place] [address] [phone] [id number]    T[employer] Marital Status             Pent[employer]ostal        Admission Date Admission Type Admitting Provider Attending Provider Department, Room/Bed    2/1/22 Emergency Carlos Saul MD  [facility] 4B, 408/1    Discharge Date Discharge Disposition Discharge Destination          2/3/2022 Home or Self Care              Attending Provider: (none)   Allergies: No Known Allergies    Isolation: None   Infection: COVID (confirmed) (02/01/22)   Code Status: Prior   Advance Care Planning Activity    Ht: 157.5 cm (62\")   Wt: 116 kg (256 lb 3.2 oz)    Admission Cmt: None   Principal Problem: Fluid overload [E87.70]                 Active Insurance as of 2/1/2022     Primary Coverage     Payor Plan Insurance Group Employer/Plan Group    [employer] [place] [employer] [place]      Payor Plan Address Payor Plan Phone Number Payor Plan Fax Number Effective Dates    [address] [address] [phone]  2/1/2022 - None Entered    [facility] [id number]       Subscriber Name Subscriber Birth Date Member ID       KRYS COX 1970 [id number]                 Emergency Contacts      (Rel.) Home Phone Work Phone Mobile Phone    CHELA COX (Spouse) -- -- [phone]               Discharge Summary      Carlos Saul MD at 02/03/22 0813              [facility] Hospital Medicine Services  DISCHARGE SUMMARY       Date of Admission: 2/1/2022  Date of Discharge:  2/3/2022  Primary Care Physician: Karolina Toney APRN    Discharge Diagnoses:  Active Hospital Problems    Diagnosis    • **Fluid overload    • Acute pulmonary edema (HCC)    • Hypertensive heart disease with acute " diastolic congestive heart failure (HCC)    • Type 2 diabetes mellitus with complication, with long-term current use of insulin (HCC)    • COVID-19 virus detected    • HLD (hyperlipidemia)    • Former smoker    • Hypertension    • Class 3 severe obesity due to excess calories with serious comorbidity and body mass index (BMI) of 40.0 to 44.9 in adult (HCC)    • Hypothyroid    in addition to above:  Mediastinal LN - need follow up c/o PCP; possibly reactive from reported hx of COVID.   History of coronary artery disease with prior coronary artery bypass graft      Presenting Problem/History of Present Illness:  Acute pulmonary edema (HCC) [J81.0]         Hospital Course  She is a 52-year-old woman who I admitted on February 1 presenting with shortness of breath, increasing swelling, paroxysmal nocturnal dyspnea.  In the recent past, her primary care provider added Lasix once daily and minoxidil to her regimen.  Her diagnostic studies showed elevated proBNP, D-dimer.  Her chest x-ray showed cardiomegaly with bilateral interstitial and airspace opacity favoring moderate pulmonary edema but infectious process also within the differential.  Patient tested positive for Covid.  Follow-up CPE angiogram chest imaging study showed no evidence of pulmonary embolus, atheromatous disease of the thoracic aorta and coronary arteries with cardiomegaly.  There is focal consolidation in the left upper lobe/lingula and left lower lobe which atelectasis versus pneumonia considered.  She also has small bilateral pleural effusion.  She has mediastinal lymphadenopathy which radiologist felt could be reactive or neoplastic.    I subsequently learned that patient recently had Covid 19 infection and what we could be seeing is it is scheduled off it.  Otherwise she was not requiring any oxygen during this hospitalization.  On my examination, I noted that she has significant pitting edema of lower extremities.  Her lung sounds are clear.  I  diuresed her.  She has negative fluid balance and felt significantly improved.  She would like to go home today.  She has a clinic appointment with her cardiologist tomorrow.    She is diabetic.  She has been on insulin pump.  She has had low blood sugar which was adequately managed.  Given heart failure consideration for SGLT2 was placed.  I will defer this to her primary care provider.  From my understanding this medication is primarily used for patients with heart failure with reduced ejection fraction.  Otherwise her echocardiogram showed normal EF.    In managing her fluid overload, I will continue her on Lasix 40 twice daily, added metolazone 2.5 mg daily.  This will replace minoxidil.  She needs a follow-up basic metabolic panel.  I advised her to weigh herself daily and monitor blood pressure closely.  I switch her to carvedilol for better control of blood pressure.   She will continue on ACE inhibitor.  Other plans as per orders.    She verbalized adequate understanding and changes made on medication and the importance of close follow-up including blood work.    Patient medically stable and appropriate for discharge    Procedures Performed:   none    Consults:   None    Pertinent Test Results:   Lab Results (last 72 hours)     Procedure Component Value Units Date/Time    POC Glucose Once [[id number]]  (Abnormal) Collected: 02/03/22 0740    Specimen: Blood Updated: 02/03/22 0751     Glucose 159 mg/dL      Comment: : [id number] Esa Eddy ID: [id number]       Basic Metabolic Panel [[id number]]  (Abnormal) Collected: 02/03/22 0348    Specimen: Blood Updated: 02/03/22 0429     Glucose 302 mg/dL      BUN 19 mg/dL      Creatinine 0.72 mg/dL      Sodium 142 mmol/L      Potassium 4.1 mmol/L      Chloride 105 mmol/L      CO2 29.0 mmol/L      Calcium 8.3 mg/dL      eGFR Non African Amer 85 mL/min/1.73      BUN/Creatinine Ratio 26.4     Anion Gap 8.0 mmol/L     Narrative:      GFR Normal >60  Chronic Kidney  Disease <60  Kidney Failure <15      POC Glucose Once [[id number]]  (Normal) Collected: 02/02/22 2054    Specimen: Blood Updated: 02/02/22 2105     Glucose 99 mg/dL      Comment: : [id number] Georges Gleason ID: [id number]       Basic Metabolic Panel [[id number]]  (Abnormal) Collected: 02/02/22 0459    Specimen: Blood Updated: 02/02/22 1845     Glucose 105 mg/dL      BUN 19 mg/dL      Creatinine 0.63 mg/dL      Sodium 141 mmol/L      Potassium 3.8 mmol/L      Chloride 105 mmol/L      CO2 24.0 mmol/L      Calcium 8.9 mg/dL      eGFR Non African Amer 100 mL/min/1.73      BUN/Creatinine Ratio 30.2     Anion Gap 12.0 mmol/L     Narrative:      GFR Normal >60  Chronic Kidney Disease <60  Kidney Failure <15      POC Glucose Once [[id number]]  (Normal) Collected: 02/02/22 1701    Specimen: Blood Updated: 02/02/22 1712     Glucose 98 mg/dL      Comment: : [id number] Nadira HeatherMeter ID: [id number]       POC Glucose Once [[id number]]  (Abnormal) Collected: 02/02/22 1149    Specimen: Blood Updated: 02/02/22 1200     Glucose 68 mg/dL      Comment: : [id number] Nadira HeatherMeter ID: [id number]       POC Glucose Once [[id number]]  (Abnormal) Collected: 02/02/22 1118    Specimen: Blood Updated: 02/02/22 1130     Glucose 56 mg/dL      Comment: : [id number] Nadira HeatherMeter ID: [id number]       POC Glucose Once [[id number]]  (Normal) Collected: 02/02/22 0721    Specimen: Blood Updated: 02/02/22 0733     Glucose 83 mg/dL      Comment: : [id number] Nadira HeatherMeter ID: [id number]       C-reactive Protein [[id number]]  (Abnormal) Collected: 02/02/22 0459    Specimen: Blood Updated: 02/02/22 0639     C-Reactive Protein 0.53 mg/dL     Ferritin [[id number]]  (Abnormal) Collected: 02/02/22 0459    Specimen: Blood Updated: 02/02/22 0639     Ferritin 157.80 ng/mL     Narrative:      Results may be falsely decreased if patient taking Biotin.      CBC & Differential [[id number]]  (Abnormal) Collected: 02/02/22 0648     Specimen: Blood Updated: 02/02/22 0612    Narrative:      The following orders were created for panel order CBC & Differential.  Procedure                               Abnormality         Status                     ---------                               -----------         ------                     CBC Auto Differential[[id number]]        Abnormal            Final result                 Please view results for these tests on the individual orders.    CBC Auto Differential [[id number]]  (Abnormal) Collected: 02/02/22 0459    Specimen: Blood Updated: 02/02/22 0612     WBC 6.22 10*3/mm3      RBC 4.89 10*6/mm3      Hemoglobin 11.8 g/dL      Hematocrit 38.6 %      MCV 78.9 fL      MCH 24.1 pg      MCHC 30.6 g/dL      RDW 15.0 %      RDW-SD 43.1 fl      MPV 10.2 fL      Platelets 402 10*3/mm3      Neutrophil % 56.6 %      Lymphocyte % 29.7 %      Monocyte % 8.5 %      Eosinophil % 4.3 %      Basophil % 0.6 %      Immature Grans % 0.3 %      Neutrophils, Absolute 3.51 10*3/mm3      Lymphocytes, Absolute 1.85 10*3/mm3      Monocytes, Absolute 0.53 10*3/mm3      Eosinophils, Absolute 0.27 10*3/mm3      Basophils, Absolute 0.04 10*3/mm3      Immature Grans, Absolute 0.02 10*3/mm3      nRBC 0.0 /100 WBC     Procalcitonin [[id number]]  (Normal) Collected: 02/01/22 1349    Specimen: Blood Updated: 02/01/22 1821     Procalcitonin <0.02 ng/mL     Narrative:      As a Marker for Sepsis (Non-Neonates):     1. <0.5 ng/mL represents a low risk of severe sepsis and/or septic shock.  2. >2 ng/mL represents a high risk of severe sepsis and/or septic shock.    As a Marker for Lower Respiratory Tract Infections that require antibiotic therapy:  PCT on Admission     Antibiotic Therapy             6-12 Hrs later  >0.5                          Strongly Recommended            >0.25 - <0.5             Recommended  0.1 - 0.25                  Discouraged                       Remeasure/reassess PCT  <0.1                         Strongly  "Discouraged         Remeasure/reassess PCT      As 28 day mortality risk marker: \"Change in Procalcitonin Result\" (>80% or <=80%) if Day 0 (or Day 1) and Day 4 values are available. Refer to http://www.[email or web address] HollisSigFigpct-calculator.com/    Change in PCT <=80 %   A decrease of PCT levels below or equal to 80% defines a positive change in PCT test result representing a higher risk for 28-day all-cause mortality of patients diagnosed with severe sepsis or septic shock.    Change in PCT >80 %   A decrease of PCT levels of more than 80% defines a negative change in PCT result representing a lower risk for 28-day all-cause mortality of patients diagnosed with severe sepsis or septic shock.                Lipid Panel [[id number]]  (Abnormal) Collected: 02/01/22 1349    Specimen: Blood Updated: 02/01/22 1820     Total Cholesterol 120 mg/dL      Triglycerides 172 mg/dL      HDL Cholesterol 46 mg/dL      LDL Cholesterol  46 mg/dL      VLDL Cholesterol 28 mg/dL      LDL/HDL Ratio 0.86    Narrative:      Cholesterol Reference Ranges  (U.S. Department of Health and Human Services ATP III Classifications)    Desirable          <200 mg/dL  Borderline High    200-239 mg/dL  High Risk          >240 mg/dL      Triglyceride Reference Ranges  (U.S. Department of Health and Human Services ATP III Classifications)    Normal           <150 mg/dL  Borderline High  150-199 mg/dL  High             200-499 mg/dL  Very High        >500 mg/dL    HDL Reference Ranges  (U.S. Department of Health and Human Services ATP III Classifications)    Low     <40 mg/dl (major risk factor for CHD)  High    >60 mg/dl ('negative' risk factor for CHD)        LDL Reference Ranges  (U.S. Department of Health and Human Services ATP III Classifications)    Optimal          <100 mg/dL  Near Optimal     100-129 mg/dL  Borderline High  130-159 mg/dL  High             160-189 mg/dL  Very High        >189 mg/dL    Ferritin [[id number]]  (Normal) Collected: 02/01/22 1349    " Specimen: Blood Updated: 02/01/22 1820     Ferritin 131.70 ng/mL     Narrative:      Results may be falsely decreased if patient taking Biotin.      C-reactive Protein [[id number]]  (Normal) Collected: 02/01/22 1349    Specimen: Blood Updated: 02/01/22 1820     C-Reactive Protein 0.44 mg/dL     TSH [[id number]]  (Normal) Collected: 02/01/22 1349    Specimen: Blood Updated: 02/01/22 1820     TSH 2.870 uIU/mL     Hemoglobin A1c [[id number]]  (Abnormal) Collected: 02/01/22 1349    Specimen: Blood Updated: 02/01/22 1811     Hemoglobin A1C 8.10 %     Narrative:      Hemoglobin A1C Ranges:    Increased Risk for Diabetes  5.7% to 6.4%  Diabetes                     >= 6.5%  Diabetic Goal                < 7.0%    COVID PRE-OP / PRE-PROCEDURE SCREENING ORDER (NO ISOLATION) - Swab, Nasal Cavity [[id number]]  (Abnormal) Collected: 02/01/22 1345    Specimen: Swab from Nasal Cavity Updated: 02/01/22 1445    Narrative:      The following orders were created for panel order COVID PRE-OP / PRE-PROCEDURE SCREENING ORDER (NO ISOLATION) - Swab, Nasal Cavity.  Procedure                               Abnormality         Status                     ---------                               -----------         ------                     COVID-19,[facility] [employer] IN-NATHALIE...[[id number]]  Abnormal            Final result                 Please view results for these tests on the individual orders.    COVID-19,[employer] [employer] IN-HOUSE,Nasal Swab No Transport Media 3-4 HR TAT - Swab, Nasal Cavity [[id number]]  (Abnormal) Collected: 02/01/22 1345    Specimen: Swab from Nasal Cavity Updated: 02/01/22 1445     COVID19 Detected    Narrative:      Fact sheet for providers: https://www.fda.gov/media/192724/download     Fact sheet for patients: https://www.fda.gov/media/416049/download    Test performed by PCR.    Consider negative results in combination with clinical observations, patient history, and epidemiological information.    Comprehensive Metabolic Panel [[id number]]   (Abnormal) Collected: 02/01/22 1349    Specimen: Blood Updated: 02/01/22 1422     Glucose 72 mg/dL      BUN 21 mg/dL      Creatinine 0.81 mg/dL      Sodium 143 mmol/L      Potassium 3.7 mmol/L      Comment: Slight hemolysis detected by analyzer. Results may be affected.        Chloride 105 mmol/L      CO2 27.0 mmol/L      Calcium 9.2 mg/dL      Total Protein 7.6 g/dL      Albumin 3.90 g/dL      ALT (SGPT) 23 U/L      AST (SGOT) 22 U/L      Alkaline Phosphatase 121 U/L      Total Bilirubin 0.4 mg/dL      eGFR Non African Amer 75 mL/min/1.73      Globulin 3.7 gm/dL      A/G Ratio 1.1 g/dL      BUN/Creatinine Ratio 25.9     Anion Gap 11.0 mmol/L     Narrative:      GFR Normal >60  Chronic Kidney Disease <60  Kidney Failure <15      Troponin [[id number]]  (Normal) Collected: 02/01/22 1349    Specimen: Blood Updated: 02/01/22 1420     Troponin T <0.010 ng/mL     Narrative:      Troponin T Reference Range:  <= 0.03 ng/mL-   Negative for AMI  >0.03 ng/mL-     Abnormal for myocardial necrosis.  Clinicians would have to utilize clinical acumen, EKG, Troponin and serial changes to determine if it is an Acute Myocardial Infarction or myocardial injury due to an underlying chronic condition.       Results may be falsely decreased if patient taking Biotin.      BNP [[id number]]  (Abnormal) Collected: 02/01/22 1349    Specimen: Blood Updated: 02/01/22 1419     proBNP 2,238.0 pg/mL     Narrative:      Among patients with dyspnea, NT-proBNP is highly sensitive for the detection of acute congestive heart failure. In addition NT-proBNP of <300 pg/ml effectively rules out acute congestive heart failure with 99% negative predictive value.    Results may be falsely decreased if patient taking Biotin.      D-dimer, Quantitative [[id number]]  (Abnormal) Collected: 02/01/22 1349    Specimen: Blood Updated: 02/01/22 1417     D-Dimer, Quantitative 0.78 mg/L (FEU)     Narrative:      Reference Range is 0-0.50 mg/L FEU. However, results <0.50  mg/L FEU tends to rule out DVT or PE. Results >0.50 mg/L FEU are not useful in predicting absence or presence of DVT or PE.      Protime-INR [[id number]]  (Normal) Collected: 02/01/22 1349    Specimen: Blood Updated: 02/01/22 1415     Protime 12.4 Seconds      INR 0.96    aPTT [[id number]]  (Normal) Collected: 02/01/22 1349    Specimen: Blood Updated: 02/01/22 1415     PTT 28.6 seconds     CBC & Differential [[id number]]  (Abnormal) Collected: 02/01/22 1349    Specimen: Blood Updated: 02/01/22 1402    Narrative:      The following orders were created for panel order CBC & Differential.  Procedure                               Abnormality         Status                     ---------                               -----------         ------                     CBC Auto Differential[[id number]]        Abnormal            Final result                 Please view results for these tests on the individual orders.    CBC Auto Differential [[id number]]  (Abnormal) Collected: 02/01/22 1349    Specimen: Blood Updated: 02/01/22 1402     WBC 9.24 10*3/mm3      RBC 5.20 10*6/mm3      Hemoglobin 12.7 g/dL      Hematocrit 41.3 %      MCV 79.4 fL      MCH 24.4 pg      MCHC 30.8 g/dL      RDW 15.3 %      RDW-SD 44.4 fl      MPV 10.0 fL      Platelets 450 10*3/mm3      Neutrophil % 61.2 %      Lymphocyte % 27.1 %      Monocyte % 7.5 %      Eosinophil % 3.4 %      Basophil % 0.5 %      Immature Grans % 0.3 %      Neutrophils, Absolute 5.66 10*3/mm3      Lymphocytes, Absolute 2.50 10*3/mm3      Monocytes, Absolute 0.69 10*3/mm3      Eosinophils, Absolute 0.31 10*3/mm3      Basophils, Absolute 0.05 10*3/mm3      Immature Grans, Absolute 0.03 10*3/mm3      nRBC 0.0 /100 WBC         Imaging Results (Last 7 Days)     Procedure Component Value Units Date/Time    CT Angiogram Chest With & Without Contrast [[id number]] Collected: 02/01/22 1849     Updated: 02/01/22 1857    Narrative:      EXAMINATION:  CT ANGIOGRAM CHEST W WO CONTRAST-  2/1/2022  6:27 PM CST     HISTORY: r/o pe; covid +, elevated d dimer, sob; J81.0-Acute pulmonary  edema; I50.9-Heart failure, unspecified; U07.1-COVID-19     COMPARISON : 3/18/2021.     DLP: 1041 mGy-cm. Automated dosage reduction technique was utilized to  decrease patient dosage.     TECHNIQUE: CT angio was performed of the chest with contrast. Coronal,  sagittal and 3-D reconstruction were performed.     MEDIASTINUM, HEART AND VASCULAR STRUCTURES: There is atheromatous  disease of the thoracic aorta and coronary arteries. There is  cardiomegaly. There are multiple mediastinal lymph nodes. The largest in  the anterior mediastinum has a short axis diameter of 1.8 cm. There are  small lymph nodes in the axillary regions left greater than right. No  definite pulmonary embolus is seen. Some of the small peripheral  branches are difficult to evaluate due to breathing motion artifact.     LUNGS: There is focal consolidation in the left upper lobe laterally.  There is mild peripheral infiltrate in the left lower lobe. There are  small pleural effusions right greater than left. There is mild mosaic  attenuation likely related to poor inspiratory effort.     UPPER ABDOMEN: There is a small midline upper abdominal wall  fat-containing hernia anteriorly measuring 2.5 cm transversely on image  110 series 8.     BONES: There has been prior median sternotomy. There are degenerative  changes of the spine.       Impression:      1. No CT evidence of pulmonary embolus. Peripheral vessels difficult to  fully evaluate due to breathing motion artifact.  2. Atheromatous disease of the thoracic aorta and coronary arteries.  Cardiomegaly.  3. Mediastinal lymphadenopathy with small hilar and axillary lymph nodes  as well. The lymph nodes may be reactive or neoplastic.  4. Focal areas of consolidation in the left upper lobe/lingula and left  lower lobe. Atelectasis versus pneumonia.  5. Small bilateral pleural effusions.  6. Other nonacute  "findings, as discussed above.    This report was finalized on 02/01/2022 18:54 by Dr. Blade Calles MD.    XR Chest 1 View [[id number]] Collected: 02/01/22 1410     Updated: 02/01/22 1415    Narrative:      EXAM: XR CHEST 1 VW-     INDICATION: Shortness of air     COMPARISON: 3/22/2021     FINDINGS:     Cardiac silhouette is enlarged and increased in size from prior study.  No large pleural effusion or visible pneumothorax. Bilateral diffuse  interstitial and airspace opacity greatest in the RIGHT middle and lower  lung zones. Prior median sternotomy. No acute osseous finding.       Impression:         Cardiomegaly and bilateral interstitial and airspace opacity. Favor  moderate pulmonary edema but infectious process is also within the  differential.  This report was finalized on 02/01/2022 14:12 by Dr. Ken Vaughan MD.          Condition on Discharge:  stable    Physical Exam on Discharge:  /78 (BP Location: Left arm, Patient Position: Sitting)   Pulse 61   Temp 97.9 °F (36.6 °C) (Oral)   Resp 18   Ht 157.5 cm (62\")   Wt 116 kg (256 lb 3.2 oz)   LMP 03/17/2020 (Approximate)   SpO2 99%   BMI 46.86 kg/m²   Physical Exam  Morbidly obese  Pleasant woman  No distress  Elevated blood pressure noted  O2 saturation 100% in room air  No accessory muscle use  Nonlabored breathing at time of examination  Speaks in full sentences  GEN: Awake, alert, interactive, in NAD  HEENT: Atraumatic, PERRLA, EOMI, Anicteric, Trachea midline  Lungs: CTAB, no wheezing/rales/rhonchi  Heart: RRR, +S1/s2, no rub  ABD: soft, nt/nd, +BS, no guarding/rebound  Extremities: atraumatic, no cyanosis, positive pitting edema bilateral lower extremities - improving; weight 256.2lbs  Skin: no rashes or lesions  Neuro: AAOx3, no focal deficits  Psych: normal mood & affect    Discharge Disposition:  Home or Self Care    Discharge Medications:     Discharge Medications      New Medications      Instructions Start Date   albuterol sulfate HFA " 108 (90 Base) MCG/ACT inhaler  Commonly known as: PROVENTIL HFA;VENTOLIN HFA;PROAIR HFA   2 puffs, Inhalation, Every 6 Hours PRN      ascorbic acid 500 MG tablet  Commonly known as: VITAMIN C   500 mg, Oral, Daily      carvedilol 3.125 MG tablet  Commonly known as: Coreg   3.125 mg, Oral, 2 Times Daily With Meals      metOLazone 2.5 MG tablet  Commonly known as: ZAROXOLYN   2.5 mg, Oral, Daily      zinc sulfate 220 (50 Zn) MG capsule  Commonly known as: ZINCATE   220 mg, Oral, Daily         Continue These Medications      Instructions Start Date   aspirin 81 MG chewable tablet   81 mg, Oral, Daily      atorvastatin 80 MG tablet  Commonly known as: LIPITOR   80 mg, Oral, Nightly      busPIRone 5 MG tablet  Commonly known as: BUSPAR   5 mg, Oral, 2 Times Daily      clopidogrel 75 MG tablet  Commonly known as: PLAVIX   75 mg, Oral, Nightly      furosemide 40 MG tablet  Commonly known as: LASIX   40 mg, Oral, Daily      insulin lispro 100 UNIT/ML injection  Commonly known as: humaLOG   Insulin Pump      levothyroxine 150 MCG tablet  Commonly known as: SYNTHROID, LEVOTHROID   150 mcg, Oral, Daily      lisinopril 20 MG tablet  Commonly known as: PRINIVIL,ZESTRIL   20 mg, Oral, 2 Times Daily      sertraline 100 MG tablet  Commonly known as: ZOLOFT   100 mg, Oral, Daily      vitamin D3 125 MCG (5000 UT) capsule capsule   10,000 Units, Oral, Daily         Stop These Medications    metoprolol tartrate 25 MG tablet  Commonly known as: LOPRESSOR     minoxidil 10 MG tablet  Commonly known as: LONITEN            Discharge Diet:   Diet Instructions     Diet: Specialty Diet, Consistent Carbohydrate; Thin Liquids, No Restrictions; Low Sodium      Discharge Diet:  Specialty Diet  Consistent Carbohydrate       Fluid Consistency: Thin Liquids, No Restrictions    Specialty Diets: Low Sodium          Discharge Care Plan / Instructions:  Daily weight   Monitor BP  Hypoglycemia precautionary measure    Activity at Discharge:   Activity  Instructions     Gradually Increase Activity Until at Pre-Hospitalization Level      Measure Weight Daily      Instruct patient on daily weights          Follow-up Appointments:   PCP within 1 wk with BMP  Keep appt with cardiology tomorrow - ? Use of SGLT   Keep appt. With Dr. Thomas in March   Test Results Pending at Discharge:      Electronically signed by Carlos Saul MD, 2/3/2022, 11:31 CST.    Time: > 30 mins        Part of this note may be an electronic transcription/translation of spoken language to printed text using the Dragon Dictation System.            Electronically signed by Carlos Saul MD at 02/03/22 1131       Discharge Order (From admission, onward)     Start     Ordered    02/03/22 1055  Discharge patient  Once        Expected Discharge Date: 02/03/22    Discharge Disposition: Home or Self Care    Physician of Record for Attribution - Please select from Treatment Team: CARLOS SAUL [[id number]]    Review needed by CMO to determine Physician of Record: No       Question Answer Comment   Physician of Record for Attribution - Please select from Treatment Team CARLOS SAUL    Review needed by CMO to determine Physician of Record No        02/03/22 1110

## 2022-02-05 ENCOUNTER — READMISSION MANAGEMENT (OUTPATIENT)
Dept: CALL CENTER | Facility: HOSPITAL | Age: 52
End: 2022-02-05

## 2022-02-05 NOTE — OUTREACH NOTE
COVID-19 Week 1 Survey      Responses   [facility] patient discharged from? [place]   Does the patient have one of the following disease processes/diagnoses(primary or secondary)? COVID-19   COVID-19 underlying condition? None   Call Number Call 2   Week 1 Call successful? Yes   Call start time 1018   Call end time 1019   Discharge diagnosis acute pulmonary edmea/Covid 19   Meds reviewed with patient/caregiver? Yes   Is the patient having any side effects they believe may be caused by any medication additions or changes? No   Does the patient have all medications ordered at discharge? Yes   Is the patient taking all medications as directed (includes completed medication regime)? Yes   Does the patient have a primary care provider?  Yes   Comments regarding PCP 2/11/22   Does the patient have an appointment with their PCP or specialist within 7 days of discharge? Yes   Has the patient kept scheduled appointments due by today? N/A   Has home health visited the patient within 72 hours of discharge? N/A   Psychosocial issues? No   What is the patient's perception of their health status since discharge? Improving   Does the patient have any of the following symptoms? Cough  [Congestion]   Nursing Interventions Nurse provided patient education   Is the patient/caregiver able to teach back steps to recovery at home? Rest and rebuild strength, gradually increase activity,  Eat a well-balance diet   COVID-19 call completed? Yes          Melba Valencia RN

## 2022-02-06 ENCOUNTER — READMISSION MANAGEMENT (OUTPATIENT)
Dept: CALL CENTER | Facility: HOSPITAL | Age: 52
End: 2022-02-06

## 2022-02-06 NOTE — OUTREACH NOTE
COVID-19 Week 1 Survey      Responses   [facility] patient discharged from? [place]   Does the patient have one of the following disease processes/diagnoses(primary or secondary)? COVID-19   COVID-19 underlying condition? None   Call Number Call 3   Week 1 Call successful? No   Discharge diagnosis COVID-19 virus detected   **Fluid overload          Marilee Elise RN

## 2022-02-10 ENCOUNTER — READMISSION MANAGEMENT (OUTPATIENT)
Dept: CALL CENTER | Facility: HOSPITAL | Age: 52
End: 2022-02-10

## 2022-02-10 NOTE — OUTREACH NOTE
COVID-19 Week 2 Survey      Responses   [facility] patient discharged from? [place]   Does the patient have one of the following disease processes/diagnoses(primary or secondary)? COVID-19   COVID-19 underlying condition? None   Call Number Call 1   COVID-19 Week 2: Call 1 attempt successful? Yes   Call start time 1211   Call end time 1214   Discharge diagnosis COVID-19 virus detected   **Fluid overload   Meds reviewed with patient/caregiver? Yes   Is the patient having any side effects they believe may be caused by any medication additions or changes? No   Is the patient taking all medications as directed (includes completed medication regime)? Yes   Does the patient have a primary care provider?  Yes   Comments regarding PCP PCP appt on 2/11/22   Does the patient have an appointment with their PCP or specialist within 7 days of discharge? Yes   Has the patient kept scheduled appointments due by today? N/A   Has home health visited the patient within 72 hours of discharge? N/A   Psychosocial issues? No   What is the patient's perception of their health status since discharge? Improving   Does the patient have any of the following symptoms? Cough  [congestion, productive cough]   Nursing Interventions Nurse provided patient education   Pulse Ox monitoring None   Is the patient/caregiver able to teach back steps to recovery at home? Set small, achievable goals for return to baseline health,  Rest and rebuild strength, gradually increase activity,  Eat a well-balance diet   Is the patient/caregiver able to teach back the hierarchy of who to call/visit for symptoms/problems? PCP, Specialist, Home health nurse, Urgent Care, ED, 911 Yes   COVID-19 call completed? Yes          SHAGUFTA HANNON RN

## 2022-03-09 ENCOUNTER — OFFICE VISIT (OUTPATIENT)
Dept: CARDIAC SURGERY | Facility: CLINIC | Age: 52
End: 2022-03-09

## 2022-03-09 ENCOUNTER — HOSPITAL ENCOUNTER (OUTPATIENT)
Dept: CT IMAGING | Facility: HOSPITAL | Age: 52
Discharge: HOME | End: 2022-03-09
Admitting: THORACIC SURGERY (CARDIOTHORACIC VASCULAR SURGERY)

## 2022-03-09 VITALS
OXYGEN SATURATION: 97 % | SYSTOLIC BLOOD PRESSURE: 160 MMHG | WEIGHT: 246.6 LBS | BODY MASS INDEX: 45.38 KG/M2 | HEART RATE: 76 BPM | HEIGHT: 62 IN | DIASTOLIC BLOOD PRESSURE: 76 MMHG

## 2022-03-09 DIAGNOSIS — R91.8 LUNG NODULES: Primary | ICD-10-CM

## 2022-03-09 DIAGNOSIS — R91.8 LUNG NODULES: ICD-10-CM

## 2022-03-09 LAB — CREAT BLDA-MCNC: 0.7 MG/DL (ref 0.6–1.3)

## 2022-03-09 PROCEDURE — 25010000002 IOPAMIDOL 61 % SOLUTION: Performed by: THORACIC SURGERY (CARDIOTHORACIC VASCULAR SURGERY)

## 2022-03-09 PROCEDURE — 82565 ASSAY OF CREATININE: CPT

## 2022-03-09 PROCEDURE — 99213 OFFICE O/P EST LOW 20 MIN: CPT | Performed by: THORACIC SURGERY (CARDIOTHORACIC VASCULAR SURGERY)

## 2022-03-09 PROCEDURE — 71260 CT THORAX DX C+: CPT

## 2022-03-09 RX ORDER — BLOOD SUGAR DIAGNOSTIC
STRIP MISCELLANEOUS
COMMUNITY
Start: 2022-03-02

## 2022-03-09 RX ADMIN — IOPAMIDOL 100 ML: 612 INJECTION, SOLUTION INTRAVENOUS at 08:39

## 2022-03-10 DIAGNOSIS — R91.8 LUNG NODULES: Primary | ICD-10-CM

## 2022-03-28 NOTE — PROGRESS NOTES
"Subjective   Chief Complaint   Patient presents with   • Coronary Artery Disease   • Lung Nodule     Patient ID: Krys Cox is a 52 y.o. female who is here for follow-up having had Coronary artery bypass grafting-3 vessel (left internal mammary artery/left anterior descending, reverse saphenous vein graft/second obtuse marginal, and reverse saphenous vein graft/posterior descending artery), bilateral open vein harvests, Prevena Incision management system performed on 3/19/2021 by Dr. Thomas.    Coronary Artery Disease  Pertinent negatives include no chest pain, leg swelling, palpitations or shortness of breath.     She returns for interval follow up of incidentally identified lung nodules.  NO new medical problems.    She denies unintended weight loss, hoarseness of voice, chest pain, hemoptysis, history of pneumonia, or cough.    The following portions of the patient's history were reviewed and updated as appropriate: allergies, current medications, past family history, past medical history, past social history, past surgical history and problem list.    Review of Systems   Constitutional: Negative for chills, diaphoresis and fever.   Respiratory: Negative for shortness of breath and wheezing.    Cardiovascular: Negative for chest pain, palpitations and leg swelling.   Gastrointestinal: Negative for constipation, diarrhea, nausea and vomiting.   Neurological: Negative for weakness.       Objective   Visit Vitals  /76 (BP Location: Right arm, Patient Position: Sitting, Cuff Size: Large Adult)   Pulse 76   Ht 157.5 cm (62\")   Wt 112 kg (246 lb 9.6 oz)   LMP 03/17/2020 (Approximate)   SpO2 97%   BMI 45.10 kg/m²       Physical Exam  Vitals reviewed.   Constitutional:       General: She is not in acute distress.     Appearance: She is well-developed. She is obese. She is not diaphoretic.   HENT:      Head: Normocephalic.   Eyes:      Pupils: Pupils are equal, round, and reactive to light.   Neck:      " Vascular: No JVD.   Cardiovascular:      Rate and Rhythm: Normal rate and regular rhythm.      Heart sounds: Normal heart sounds. No murmur heard.    No friction rub.   Pulmonary:      Effort: Pulmonary effort is normal. No respiratory distress.      Breath sounds: Normal breath sounds. No stridor. No wheezing or rales.   Abdominal:      General: There is no distension.      Palpations: Abdomen is soft.      Tenderness: There is no abdominal tenderness.   Musculoskeletal:      Cervical back: Normal range of motion and neck supple.      Right lower leg: No edema.      Left lower leg: No edema.   Skin:     General: Skin is warm and dry.      Coloration: Skin is not pale.      Findings: No erythema or rash.   Neurological:      Mental Status: She is alert and oriented to person, place, and time.      Cranial Nerves: No cranial nerve deficit.   Psychiatric:         Behavior: Behavior normal.         Study Result    Narrative & Impression   EXAM: CT CHEST W CONTRAST DIAGNOSTIC-     INDICATION: lung nodules; R91.8-Other nonspecific abnormal finding of  lung field     COMPARISON: 2/1/2022     DLP: 3/18/2021 mGy cm. Automated exposure control was also utilized to  decrease patient radiation dose.     FINDINGS:     No change in 4 mm subpleural RIGHT lower lobe pulmonary nodule on image  93. No change in 3 mm RIGHT upper lobe pulmonary nodule on image 34.  Calcified granuloma in the RIGHT lower lobe. No new or enlarging  pulmonary nodule. Atelectasis and/or scarring in the lingula, RIGHT  middle lobe, and LEFT lower lobe. No consolidation or pleural effusion.  Central airways are clear.     No enlarged axillary, supraclavicular, or mediastinal lymph nodes.  Borderline enlarged RIGHT hilar lymph node is unchanged. No central  pulmonary artery filling defect. Thoracic aorta is nonaneurysmal and  contains atherosclerotic calcification plaque postoperative change of  median sternotomy and CABG. No pericardial effusion. Small  amount of  fluid in the superior pericardial recess noted.     No large thyroid nodule. No acute chest wall soft tissue abnormality.  Limited view the upper abdomen is unremarkable. No acute osseous  finding.     IMPRESSION:     No change in small 3 to 4 mm RIGHT pulmonary nodules. Consider continued  imaging surveillance to document two-year stability.  This report was finalized on 03/09/2022 08:58 by Dr. Ken Vaughan MD.     Independent review is performed by me.  Her right lung nodules are stable.  They measure 3-4 mm in size.        Assessment/Plan         Diagnoses and all orders for this visit:    1. Lung nodules (Primary)         Overall, Krys Cox is doing well. She is a high risk patient for lung cancer.  She will need a repeat CT scan in one year as surveillance.  She then will require ongoing screening. We discussed signs and symptoms of malignancy. Provided support and encouragement. All questions have been answered to the best of my ability. Patient has been instructed to contact our office with any questions or concerns should they arise prior to the next office visit.     Patient's Body mass index is 45.1 kg/m². BMI is above normal parameters. Recommendations include: educational material and referral to primary care.    I have congratulated Krys Cox on successful smoking cessation thus far. Krys Cox  started smoking about 37 years ago. She has a 35.00 pack-year smoking history. She has never used smokeless tobacco. I have educated her on the risk of diseases from using tobacco products such as cancer, COPD and heart disease.      Advance Care Planning   ACP discussion was declined by the patient. n/a as patient is under 65 years of age.      Many thanks for the opportunity to care for your patient.    I will continue to keep you apprised of provided care as it ensues.      A return to clinic appointment has been set 1 year with CT scan of the chest

## 2022-03-29 ENCOUNTER — OFFICE VISIT (OUTPATIENT)
Dept: CARDIOLOGY | Facility: CLINIC | Age: 52
End: 2022-03-29

## 2022-03-29 VITALS
HEART RATE: 86 BPM | BODY MASS INDEX: 45.08 KG/M2 | SYSTOLIC BLOOD PRESSURE: 124 MMHG | DIASTOLIC BLOOD PRESSURE: 66 MMHG | WEIGHT: 245 LBS | HEIGHT: 62 IN | OXYGEN SATURATION: 98 %

## 2022-03-29 DIAGNOSIS — I25.10 CORONARY ARTERY DISEASE INVOLVING NATIVE CORONARY ARTERY OF NATIVE HEART WITHOUT ANGINA PECTORIS: Primary | ICD-10-CM

## 2022-03-29 DIAGNOSIS — I50.32 HYPERTENSIVE HEART DISEASE WITH CHRONIC DIASTOLIC CONGESTIVE HEART FAILURE: ICD-10-CM

## 2022-03-29 DIAGNOSIS — I11.0 HYPERTENSIVE HEART DISEASE WITH CHRONIC DIASTOLIC CONGESTIVE HEART FAILURE: ICD-10-CM

## 2022-03-29 DIAGNOSIS — I10 PRIMARY HYPERTENSION: ICD-10-CM

## 2022-03-29 DIAGNOSIS — E78.2 MIXED HYPERLIPIDEMIA: ICD-10-CM

## 2022-03-29 PROBLEM — J81.0 ACUTE PULMONARY EDEMA: Status: RESOLVED | Noted: 2022-02-01 | Resolved: 2022-03-29

## 2022-03-29 PROBLEM — E87.70 FLUID OVERLOAD: Status: RESOLVED | Noted: 2022-02-03 | Resolved: 2022-03-29

## 2022-03-29 PROBLEM — Z72.0 TOBACCO USE: Status: RESOLVED | Noted: 2021-03-18 | Resolved: 2022-03-29

## 2022-03-29 PROBLEM — U07.1 COVID-19 VIRUS DETECTED: Status: RESOLVED | Noted: 2022-02-01 | Resolved: 2022-03-29

## 2022-03-29 PROCEDURE — 93000 ELECTROCARDIOGRAM COMPLETE: CPT | Performed by: INTERNAL MEDICINE

## 2022-03-29 PROCEDURE — 99204 OFFICE O/P NEW MOD 45 MIN: CPT | Performed by: INTERNAL MEDICINE

## 2022-03-29 NOTE — PROGRESS NOTES
Subjective:     Encounter Date:03/29/2022      Patient ID: Krys Cox is a 52 y.o. female with coronary artery disease, status post previous CABG (LIMA to LAD, SVG to OM2, and SVG to PDA), performed on 3/19/2021 by Dr. Thomas, hypertension, hyperlipidemia, insulin requiring diabetes mellitus, diastolic dysfunction, here to establish care.    Referring Provider: Karolina Toney APRN  Reason for Referral: CHF    Chief Complaint: Here to establish care - CAD, CABG    This is a 52-year-old female who presents today to establish care.  She does have coronary artery disease with previous bypass grafting just over a year ago.  She was more recently diagnosed with fluid overload, thought to be secondary to diastolic dysfunction/diastolic heart failure.  She has been doing well since discharge.  Still with some minor edema but overall much improved.  Weight has been stable.  Breathing is stable with no significant shortness of breath, dyspnea on exertion.  No orthopnea, PND.  The patient denies having any chest pain.  She has been on dual antiplatelet therapy since her bypass surgery over a year ago.  No significant bleeding issues.  Her blood pressure is generally well controlled.  She also reports good control of her lipids in the past.  No side effects on current medications.  Occasionally, the patient will be lightheaded when standing up from a seated position.  No syncopal episodes.  She does note also some joint issues related to arthritis.    Coronary Artery Disease  Presents for initial visit. The disease course has been stable. Symptoms include dizziness and leg swelling. Pertinent negatives include no chest pain, palpitations or shortness of breath. Risk factors include diabetes, hyperlipidemia and hypertension. Past treatments include aspirin, statins, antiplatelet agents and beta blockers. The treatment provided significant relief. Her past medical history is significant for CHF. Past surgical  history includes CABG.   Congestive Heart Failure  Presents for initial visit. The disease course has been improving. Associated symptoms include edema. Pertinent negatives include no abdominal pain, chest pain, palpitations, paroxysmal nocturnal dyspnea, shortness of breath or unexpected weight change. The symptoms have been improving. Past treatments include salt and fluid restriction and beta blockers. The treatment provided significant relief. Compliance with prior treatments has been good. Her past medical history is significant for CAD.     The following portions of the patient's history were reviewed and updated as appropriate: allergies, current medications, past family history, past medical history, past social history, past surgical history and problem list.     Past Medical History:   Diagnosis Date   • Arthritis    • CHF (congestive heart failure) (HCC)     Diastolic   • Coronary artery disease    • Diabetes mellitus (HCC)    • Disease of thyroid gland    • Elevated cholesterol    • Hypertension      Past Surgical History:   Procedure Laterality Date   • CARDIAC CATHETERIZATION     •  SECTION      she has had 4   • CORONARY ARTERY BYPASS GRAFT N/A 3/19/2021    Procedure: CORONARY ARTERY BYPASS GRAFT X 3 WITH LEFT INTERNAL MAMMARY ARTERY, BILATERAL LOWER EXTREMITY OPEN VEIN HARVEST, AND PERFUSION; APPLICATION OF PREVENA INCISIONAL WOUND VAC X 3; TRANSESOPHAGEAL ECHOCARDIOGRAM;  Surgeon: Vicente Thomas MD;  Location: [facility] OR;  Service: Cardiothoracic;  Laterality: N/A;   • DILATATION AND CURETTAGE     • TUBAL ABDOMINAL LIGATION         Current Outpatient Medications:   •  Accu-Chek Guide test strip, , Disp: , Rfl:   •  albuterol sulfate  (90 Base) MCG/ACT inhaler, Inhale 2 puffs Every 6 (Six) Hours As Needed for Shortness of Air., Disp: , Rfl:   •  aspirin 81 MG chewable tablet, Chew 81 mg Daily., Disp: , Rfl:   •  atorvastatin (LIPITOR) 80 MG tablet, Take 80 mg by mouth Every Night.,  Disp: , Rfl:   •  busPIRone (BUSPAR) 5 MG tablet, Take 5 mg by mouth 2 (Two) Times a Day., Disp: , Rfl:   •  carvedilol (Coreg) 3.125 MG tablet, Take 1 tablet by mouth 2 (Two) Times a Day With Meals., Disp: 30 tablet, Rfl: 0  •  furosemide (LASIX) 40 MG tablet, Take 1 tablet by mouth Daily., Disp: 40 tablet, Rfl: 0  •  insulin lispro (humaLOG) 100 UNIT/ML injection, Insulin Pump, Disp: , Rfl:   •  levothyroxine (SYNTHROID, LEVOTHROID) 150 MCG tablet, Take 150 mcg by mouth Daily., Disp: , Rfl:   •  lisinopril (PRINIVIL,ZESTRIL) 20 MG tablet, Take 20 mg by mouth 2 (Two) Times a Day., Disp: , Rfl:   •  metOLazone (ZAROXOLYN) 2.5 MG tablet, Take 1 tablet by mouth Daily., Disp: 15 tablet, Rfl: 0  •  sertraline (ZOLOFT) 100 MG tablet, Take 100 mg by mouth Daily., Disp: , Rfl:   •  vitamin D3 125 MCG (5000 UT) capsule capsule, Take 10,000 Units by mouth Daily., Disp: , Rfl:     No Known Allergies    Social History     Tobacco Use   • Smoking status: Former Smoker     Packs/day: 1.00     Years: 35.00     Pack years: 35.00     Types: Cigarettes     Start date:      Quit date: 3/12/2021     Years since quittin.0   • Smokeless tobacco: Never Used   • Tobacco comment: quit 2 weeks ago when hospitalized    Substance Use Topics   • Alcohol use: Not Currently     Family History   Problem Relation Age of Onset   • Hypertension Mother    • Clotting disorder Mother    • Hyperlipidemia Sister    • Clotting disorder Maternal Grandmother       Review of Systems   Constitutional: Negative for chills, fever, unexpected weight change and weight loss.   HENT: Negative for congestion and hearing loss.    Eyes: Negative for blurred vision and pain.   Cardiovascular: Positive for leg swelling. Negative for chest pain, dyspnea on exertion, orthopnea, palpitations, paroxysmal nocturnal dyspnea and syncope.   Respiratory: Negative for cough, shortness of breath and wheezing.    Endocrine: Negative for cold intolerance and heat  intolerance.   Hematologic/Lymphatic: Negative for adenopathy and bleeding problem.   Skin: Negative for color change, poor wound healing and rash.   Musculoskeletal: Positive for arthritis. Negative for myalgias and neck pain.   Gastrointestinal: Negative for abdominal pain, change in bowel habit, nausea and vomiting.   Genitourinary: Negative for dysuria and frequency.   Neurological: Positive for dizziness. Negative for focal weakness, headaches, loss of balance and numbness.   Psychiatric/Behavioral: Negative for altered mental status and memory loss.   Allergic/Immunologic: Negative for hives and persistent infections.       ECG 12 Lead    Date/Time: 3/29/2022 3:01 PM  Performed by: Brady Valles MD  Authorized by: Brady Valles MD   Comparison: compared with previous ECG from 2/1/2022  Similar to previous ECG  Rhythm: sinus rhythm  Rate: normal  BPM: 85  Conduction: conduction normal  Q waves: V1 and V2    QRS axis: left  Other findings: left ventricular hypertrophy    Clinical impression: abnormal EKG             Objective:     Vitals reviewed.   Constitutional:       General: Not in acute distress.     Appearance: Normal and healthy appearance. Not in distress. Obese. Not toxic-appearing or diaphoretic.   Eyes:      General: Lids are normal.      Extraocular Movements: Extraocular movements intact.      Pupils: Pupils are equal, round, and reactive to light.   HENT:      Head: Normocephalic and atraumatic.      Right Ear: External ear normal.      Left Ear: External ear normal.      Nose: Nose normal.    Mouth/Throat:      Mouth: Mucous membranes are not pale, not dry and not cyanotic.   Neck:      Thyroid: No thyroid mass or thyromegaly.      Vascular: No carotid bruit, hepatojugular reflux or JVD.      Trachea: No tracheal deviation.      Lymphadenopathy: No cervical adenopathy.   Pulmonary:      Effort: Pulmonary effort is normal. No accessory muscle usage or respiratory distress.       "Breath sounds: Normal breath sounds. No wheezing. No rhonchi. No rales.   Chest:      Chest wall: Not tender to palpatation.   Cardiovascular:      Normal rate. Regular rhythm.      Murmurs: There is no murmur.      No gallop.   Pulses:     Intact distal pulses.   Edema:     Pretibial: bilateral trace edema of the pretibial area.     Ankle: bilateral trace edema of the ankle.  Abdominal:      General: Abdomen is protuberant. Bowel sounds are normal. There is no distension or abdominal bruit.      Palpations: Abdomen is soft.      Tenderness: There is no abdominal tenderness.   Musculoskeletal: Normal range of motion.         General: No tenderness or deformity.      Extremities: No clubbing present.     Cervical back: Normal range of motion and neck supple. No edema. Skin:     General: Skin is warm and dry. There is no cyanosis.      Findings: No erythema or rash.   Neurological:      General: No focal deficit present.      Mental Status: Oriented to person, place, and time and oriented to person, place and time.      Cranial Nerves: No cranial nerve deficit.   Psychiatric:         Attention and Perception: Attention normal.         Mood and Affect: Mood normal.         Speech: Speech normal.         Behavior: Behavior normal. Behavior is cooperative.         Thought Content: Thought content normal.       /66   Pulse 86   Ht 157.5 cm (62\")   Wt 111 kg (245 lb)   LMP 03/17/2020 (Approximate)   SpO2 98%   BMI 44.81 kg/m²     Data/Lab Review:     Lab Results   Component Value Date    WBC 6.22 02/02/2022    HGB 11.8 (L) 02/02/2022    HCT 38.6 02/02/2022    MCV 78.9 (L) 02/02/2022     02/02/2022     Lab Results   Component Value Date    GLUCOSE 302 (H) 02/03/2022    CALCIUM 8.3 (L) 02/03/2022     02/03/2022    K 4.1 02/03/2022    CO2 29.0 02/03/2022     02/03/2022    BUN 19 02/03/2022    CREATININE 0.70 03/09/2022    EGFRIFNONA 85 02/03/2022    BCR 26.4 (H) 02/03/2022    ANIONGAP 8.0 " 02/03/2022     Lab Results   Component Value Date    CHOL 120 02/01/2022    TRIG 172 (H) 02/01/2022    HDL 46 02/01/2022    LDL 46 02/01/2022     ====================================================    Results for orders placed during the hospital encounter of 02/01/22    Adult Transthoracic Echo Complete W/ Cont if Necessary Per Protocol    Interpretation Summary  · Left ventricular ejection fraction appears to be 56 - 60%.  · Left ventricular wall thickness is consistent with mild concentric hypertrophy.  · Left ventricular diastolic function is consistent with (grade II w/high LAP) pseudonormalization.  · Left atrial volume is mildly increased.    Echo 3/18/21:  · Left ventricular ejection fraction appears to be 56 - 60%. Left ventricular systolic function is normal.  · Left ventricular diastolic function is consistent with (grade I) impaired relaxation.  · Left atrial volume is severely increased.  · Estimated right ventricular systolic pressure from tricuspid regurgitation is normal (<35 mmHg).    Echo 9/2017:   Mildly dilated left atrium.    Normal left ventricular size with severely reduced LV function and an    estimated ejection fraction of approximately 20-25%.    Normal left ventricular wall thickness.    Severe global hypokinesis is noted.    No evidence of left ventricular mass or thrombus noted.         Assessment:          Diagnosis Plan   1. Coronary artery disease involving native coronary artery of native heart without angina pectoris  ECG 12 Lead   2. Hypertensive heart disease with chronic diastolic congestive heart failure (HCC)     3. Primary hypertension     4. Mixed hyperlipidemia            Plan:       1.  Coronary artery disease: Stable at this time with no ischemic symptoms.  The patient is greater than 12 months out from her bypass surgery.  Plavix can be discontinued but we did discuss that she will need to remain on aspirin 81 mg daily without interruption.  The patient also remains on  beta-blocker and statin therapies.    2.  Diastolic heart failure: Clinically, the patient is stable at this time with only trace peripheral edema.  Her breathing has improved.  She is on Lasix and metolazone.  She is trying to monitor sodium and fluid intake and weighing herself regularly.  We did discuss flexible dosing of diuretics today and the need for continued daily weights, sodium and fluid monitoring.    3.  Essential hypertension: Blood pressure is currently well controlled.  Continue current medications.    4.  Mixed hyperlipidemia: The patient remains on statin therapy.  Her lipids are well controlled based on her most recent lipid profile which is referenced above.    We will plan to see the patient back in 3 months unless otherwise needed sooner.

## 2022-05-17 ENCOUNTER — RESEARCH ENCOUNTER (OUTPATIENT)
Dept: OTHER | Facility: OTHER | Age: 52
End: 2022-05-17

## 2022-05-17 NOTE — RESEARCH
With permission from Dr. Valles I called Ms. Cox today to discuss her possible qualification for the [id number] trial. I explained to her the basic details of the trial and she agreed to come in soon to discuss her possible enrollment in the trial.

## 2022-07-13 ENCOUNTER — OFFICE VISIT (OUTPATIENT)
Dept: CARDIOLOGY | Facility: CLINIC | Age: 52
End: 2022-07-13

## 2022-07-13 VITALS
SYSTOLIC BLOOD PRESSURE: 112 MMHG | BODY MASS INDEX: 47.29 KG/M2 | HEIGHT: 62 IN | WEIGHT: 257 LBS | DIASTOLIC BLOOD PRESSURE: 64 MMHG | HEART RATE: 79 BPM | OXYGEN SATURATION: 98 %

## 2022-07-13 DIAGNOSIS — I10 PRIMARY HYPERTENSION: ICD-10-CM

## 2022-07-13 DIAGNOSIS — I50.32 HYPERTENSIVE HEART DISEASE WITH CHRONIC DIASTOLIC CONGESTIVE HEART FAILURE: ICD-10-CM

## 2022-07-13 DIAGNOSIS — I11.0 HYPERTENSIVE HEART DISEASE WITH CHRONIC DIASTOLIC CONGESTIVE HEART FAILURE: ICD-10-CM

## 2022-07-13 DIAGNOSIS — I25.10 CORONARY ARTERY DISEASE INVOLVING NATIVE CORONARY ARTERY OF NATIVE HEART WITHOUT ANGINA PECTORIS: Primary | ICD-10-CM

## 2022-07-13 PROCEDURE — 99213 OFFICE O/P EST LOW 20 MIN: CPT | Performed by: INTERNAL MEDICINE

## 2022-07-13 RX ORDER — PREDNISOLONE ACETATE 10 MG/ML
1 SUSPENSION/ DROPS OPHTHALMIC 2 TIMES DAILY
COMMUNITY
End: 2023-01-23

## 2022-07-13 RX ORDER — GABAPENTIN 300 MG/1
300 CAPSULE ORAL 3 TIMES DAILY
COMMUNITY
End: 2023-01-23

## 2022-07-13 RX ORDER — DICLOFENAC SODIUM 75 MG/1
75 TABLET, DELAYED RELEASE ORAL 2 TIMES DAILY
COMMUNITY
End: 2023-01-23 | Stop reason: ALTCHOICE

## 2022-07-13 NOTE — PROGRESS NOTES
Subjective:     Encounter Date:07/13/2022      Patient ID: Krys Cox is a 52 y.o. female  with coronary artery disease, status post previous CABG (LIMA to LAD, SVG to OM2, and SVG to PDA), performed on 3/19/2021 by Dr. Thomas, hypertension, hyperlipidemia, insulin requiring diabetes mellitus, diastolic dysfunction, who presents today for routine follow-up.    Chief Complaint: Here for follow-up of CAD    HPI: This patient presents today for follow-up of coronary artery disease.  She says that she seems to be doing well at this time.  No significant chest discomfort.  Every once while she will have a sharp pain in the chest, however this is not new or unusual for her.  She describes breathing as being relatively stable.  She is known to have chronic diastolic heart failure.  She takes diuretic therapy.  Symptoms have been well controlled on these therapies.  No reports of orthopnea, PND or significant edema.  The patient notes occasional positional lightheadedness and dizziness but no syncopal episodes.  She notes an occasional cough but no wheezing.  Blood pressure is generally well controlled.  She remains on high intensity statin therapy as well as aspirin as well.  No significant side effects from any of her medications.  She is due to have carpal tunnel surgery tomorrow morning.  She describes some numbness and paresthesias in her hand.  She also says that she was recently diagnosed with rheumatoid arthritis and has significant joint pain.    Coronary Artery Disease  Presents for follow-up visit. Symptoms include dizziness and shortness of breath. Pertinent negatives include no chest pain, leg swelling or palpitations. Her past medical history is significant for CHF. The symptoms have been stable.   Congestive Heart Failure  Presents for follow-up visit. Associated symptoms include shortness of breath. Pertinent negatives include no abdominal pain, chest pain, edema, orthopnea, palpitations,  paroxysmal nocturnal dyspnea or unexpected weight change. The symptoms have been stable. Her past medical history is significant for CAD.       Current Outpatient Medications:   •  Accu-Chek Guide test strip, , Disp: , Rfl:   •  aspirin 81 MG chewable tablet, Chew 81 mg Daily., Disp: , Rfl:   •  atorvastatin (LIPITOR) 80 MG tablet, Take 80 mg by mouth Every Night., Disp: , Rfl:   •  busPIRone (BUSPAR) 5 MG tablet, Take 5 mg by mouth 2 (Two) Times a Day., Disp: , Rfl:   •  carvedilol (Coreg) 3.125 MG tablet, Take 1 tablet by mouth 2 (Two) Times a Day With Meals., Disp: 30 tablet, Rfl: 0  •  diclofenac (VOLTAREN) 75 MG EC tablet, Take 75 mg by mouth 2 (Two) Times a Day., Disp: , Rfl:   •  furosemide (LASIX) 40 MG tablet, Take 1 tablet by mouth Daily., Disp: 40 tablet, Rfl: 0  •  gabapentin (NEURONTIN) 300 MG capsule, Take 300 mg by mouth 3 (Three) Times a Day., Disp: , Rfl:   •  insulin lispro (humaLOG) 100 UNIT/ML injection, Insulin Pump, Disp: , Rfl:   •  levothyroxine (SYNTHROID, LEVOTHROID) 150 MCG tablet, Take 150 mcg by mouth Daily., Disp: , Rfl:   •  lisinopril (PRINIVIL,ZESTRIL) 20 MG tablet, Take 20 mg by mouth 2 (Two) Times a Day., Disp: , Rfl:   •  metOLazone (ZAROXOLYN) 2.5 MG tablet, Take 1 tablet by mouth Daily., Disp: 15 tablet, Rfl: 0  •  prednisoLONE acetate (PRED FORTE) 1 % ophthalmic suspension, 1 drop 2 (Two) Times a Day., Disp: , Rfl:   •  sertraline (ZOLOFT) 100 MG tablet, Take 100 mg by mouth Daily., Disp: , Rfl:   •  vitamin D3 125 MCG (5000 UT) capsule capsule, Take 10,000 Units by mouth Daily., Disp: , Rfl:   •  albuterol sulfate  (90 Base) MCG/ACT inhaler, Inhale 2 puffs Every 6 (Six) Hours As Needed for Shortness of Air., Disp: , Rfl:     No Known Allergies    Social History     Tobacco Use   • Smoking status: Former Smoker     Packs/day: 1.00     Years: 35.00     Pack years: 35.00     Types: Cigarettes     Start date:      Quit date: 3/12/2021     Years since quittin.3   •  Smokeless tobacco: Never Used   • Tobacco comment: quit 2 weeks ago when hospitalized    Substance Use Topics   • Alcohol use: Not Currently     Review of Systems   Constitutional: Negative for fever, unexpected weight change and weight loss.   Cardiovascular: Negative for chest pain, dyspnea on exertion, leg swelling, orthopnea, palpitations, paroxysmal nocturnal dyspnea and syncope.   Respiratory: Positive for cough and shortness of breath. Negative for wheezing.    Hematologic/Lymphatic: Negative for adenopathy and bleeding problem.   Musculoskeletal: Positive for arthritis.   Gastrointestinal: Negative for abdominal pain, nausea and vomiting.   Neurological: Positive for dizziness, light-headedness, numbness and paresthesias. Negative for headaches and loss of balance.       Procedures: None today but I did review the ECG performed on 3/20/2022 showing normal sinus rhythm, ventricular rate 85, left axis deviation with incomplete right bundle branch block, Q waves in the septal leads, no acute ST changes.  Voltage criteria for LVH noted.       Objective:     Vitals reviewed.   Constitutional:       General: Not in acute distress.     Appearance: Well-developed and not in distress.   Eyes:      Extraocular Movements: Extraocular movements intact.   HENT:      Head: Normocephalic and atraumatic.   Neck:      Thyroid: No thyroid mass.      Vascular: No JVD.   Pulmonary:      Effort: Pulmonary effort is normal.      Breath sounds: Normal breath sounds. No wheezing. No rhonchi. No rales.   Cardiovascular:      Normal rate. Regular rhythm.      Murmurs: There is no murmur.      No gallop.   Pulses:     Intact distal pulses.   Edema:     Peripheral edema absent.   Abdominal:      General: Abdomen is protuberant. Bowel sounds are normal. There is no distension.      Palpations: Abdomen is soft.      Tenderness: There is no abdominal tenderness.   Musculoskeletal:      Extremities: No clubbing present.Skin:     General:  "Skin is warm and dry. There is no cyanosis.      Findings: No erythema or rash.   Neurological:      Mental Status: Alert and oriented to person, place, and time.      Cranial Nerves: No cranial nerve deficit.       /64   Pulse 79   Ht 157.5 cm (62\")   Wt 117 kg (257 lb)   LMP 03/17/2020 (Approximate)   SpO2 98%   BMI 47.01 kg/m²     Data/Lab Review:     Lab Results   Component Value Date    CHOL 120 02/01/2022    TRIG 172 (H) 02/01/2022    HDL 46 02/01/2022    LDL 46 02/01/2022     =======================================================    Results for orders placed during the hospital encounter of 02/01/22    Adult Transthoracic Echo Complete W/ Cont if Necessary Per Protocol    Interpretation Summary  · Left ventricular ejection fraction appears to be 56 - 60%.  · Left ventricular wall thickness is consistent with mild concentric hypertrophy.  · Left ventricular diastolic function is consistent with (grade II w/high LAP) pseudonormalization.  · Left atrial volume is mildly increased.     Echo 3/18/21:  · Left ventricular ejection fraction appears to be 56 - 60%. Left ventricular systolic function is normal.  · Left ventricular diastolic function is consistent with (grade I) impaired relaxation.  · Left atrial volume is severely increased.  · Estimated right ventricular systolic pressure from tricuspid regurgitation is normal (<35 mmHg).     Echo 9/2017:   Mildly dilated left atrium.    Normal left ventricular size with severely reduced LV function and an    estimated ejection fraction of approximately 20-25%.    Normal left ventricular wall thickness.    Severe global hypokinesis is noted.    No evidence of left ventricular mass or thrombus noted.       Assessment:          Diagnosis Plan   1. Coronary artery disease involving native coronary artery of native heart without angina pectoris     2. Hypertensive heart disease with chronic diastolic congestive heart failure (HCC)     3. Primary hypertension "          Plan:       1.  Coronary artery disease: The patient remains clinically stable at this time.  She does not describe any ischemic symptoms.  She does remain on aspirin, beta-blocker, statin therapies.  No changes recommended.     2.  Diastolic heart failure: Euvolemic on today's exam.  Clinically stable.  The patient continues to take diuretic therapy.  This seems to have helped significantly with her symptoms.  Continue to monitor weight, sodium, fluid intake and output, etc.     3.  Essential hypertension: Blood pressure is currently well controlled.  Continue current medication at this time.    The patient is to undergo a low to possibly intermediate risk surgery tomorrow morning.  The patient demonstrates no unstable cardiac symptoms.  She is likely at some increased risk given her history of ischemic heart disease and diastolic heart failure but at this point with no significant cardiac symptoms and no new exam findings, she would not be in need of any further cardiac work-up as any such work-up would not necessarily lower her operative risk.  She should, however, remain on aspirin 81 mg daily uninterrupted.     We will see the patient back in 6 months unless otherwise needed sooner.

## 2022-08-26 ENCOUNTER — TRANSCRIBE ORDERS (OUTPATIENT)
Dept: ADMINISTRATIVE | Facility: HOSPITAL | Age: 52
End: 2022-08-26

## 2022-08-26 DIAGNOSIS — Z12.31 ENCOUNTER FOR SCREENING MAMMOGRAM FOR MALIGNANT NEOPLASM OF BREAST: Primary | ICD-10-CM

## 2022-09-07 ENCOUNTER — APPOINTMENT (OUTPATIENT)
Dept: MAMMOGRAPHY | Facility: HOSPITAL | Age: 52
End: 2022-09-07

## 2022-09-20 ENCOUNTER — APPOINTMENT (OUTPATIENT)
Dept: MAMMOGRAPHY | Facility: HOSPITAL | Age: 52
End: 2022-09-20

## 2022-09-26 ENCOUNTER — OFFICE VISIT (OUTPATIENT)
Dept: GASTROENTEROLOGY | Facility: CLINIC | Age: 52
End: 2022-09-26

## 2022-09-26 VITALS
BODY MASS INDEX: 45.27 KG/M2 | OXYGEN SATURATION: 97 % | HEIGHT: 62 IN | WEIGHT: 246 LBS | HEART RATE: 82 BPM | DIASTOLIC BLOOD PRESSURE: 68 MMHG | SYSTOLIC BLOOD PRESSURE: 118 MMHG | TEMPERATURE: 97 F

## 2022-09-26 DIAGNOSIS — R19.5 POSITIVE COLORECTAL CANCER SCREENING USING DNA-BASED STOOL TEST: Primary | ICD-10-CM

## 2022-09-26 PROCEDURE — 99214 OFFICE O/P EST MOD 30 MIN: CPT | Performed by: NURSE PRACTITIONER

## 2022-09-26 NOTE — PROGRESS NOTES
Chief Complaint   Patient presents with   • Colonoscopy     Positive colaguard never had colon        PCP: Karolina Toney APRN  REFER: No ref. provider found    Subjective     HPI    She presents to office with positive cologaurd test from PCP office in 2022.     She denies change in bowels.  Bowels described as moving slow, stool described as small and hard.  Krys Cox passes stool daily.  No abdominal pain.  No BRBPR.  No melena.  Weight is stable.  She has not undergone previous colonoscopy evaluation.  There is not a family history of colon cancer or colon polyps.    Past Medical History:   Diagnosis Date   • Arthritis    • CHF (congestive heart failure) (HCC)     Diastolic   • Coronary artery disease    • Diabetes mellitus (HCC)    • Disease of thyroid gland    • Elevated cholesterol    • Hypertension        Past Surgical History:   Procedure Laterality Date   • CARDIAC CATHETERIZATION     • CARPAL TUNNEL RELEASE     •  SECTION      she has had 4   • CORONARY ARTERY BYPASS GRAFT N/A 2021    Procedure: CORONARY ARTERY BYPASS GRAFT X 3 WITH LEFT INTERNAL MAMMARY ARTERY, BILATERAL LOWER EXTREMITY OPEN VEIN HARVEST, AND PERFUSION; APPLICATION OF PREVENA INCISIONAL WOUND VAC X 3; TRANSESOPHAGEAL ECHOCARDIOGRAM;  Surgeon: Vicente Thomas MD;  Location: [facility];  Service: Cardiothoracic;  Laterality: N/A;   • DILATATION AND CURETTAGE     • TUBAL ABDOMINAL LIGATION         Outpatient Medications Marked as Taking for the 22 encounter (Office Visit) with Nader Cox APRN   Medication Sig Dispense Refill   • albuterol sulfate  (90 Base) MCG/ACT inhaler Inhale 2 puffs Every 6 (Six) Hours As Needed for Shortness of Air.     • aspirin 81 MG chewable tablet Chew 81 mg Daily.     • atorvastatin (LIPITOR) 80 MG tablet Take 80 mg by mouth Every Night.     • busPIRone (BUSPAR) 5 MG tablet Take 5 mg by mouth 2 (Two) Times a Day.     • carvedilol (Coreg) 3.125 MG tablet  "Take 1 tablet by mouth 2 (Two) Times a Day With Meals. 30 tablet 0   • diclofenac (VOLTAREN) 75 MG EC tablet Take 75 mg by mouth 2 (Two) Times a Day.     • furosemide (LASIX) 40 MG tablet Take 1 tablet by mouth Daily. 40 tablet 0   • insulin lispro (humaLOG) 100 UNIT/ML injection Insulin Pump     • levothyroxine (SYNTHROID, LEVOTHROID) 150 MCG tablet Take 150 mcg by mouth Daily.     • lisinopril (PRINIVIL,ZESTRIL) 20 MG tablet Take 20 mg by mouth 2 (Two) Times a Day.     • sertraline (ZOLOFT) 100 MG tablet Take 100 mg by mouth Daily.     • vitamin D3 125 MCG (5000 UT) capsule capsule Take 10,000 Units by mouth Daily.         No Known Allergies    Social History     Socioeconomic History   • Marital status:    Tobacco Use   • Smoking status: Former Smoker     Packs/day: 1.00     Years: 35.00     Pack years: 35.00     Types: Cigarettes     Start date:      Quit date: 3/12/2021     Years since quittin.5   • Smokeless tobacco: Never Used   • Tobacco comment: quit 2 weeks ago when hospitalized    Vaping Use   • Vaping Use: Never used   Substance and Sexual Activity   • Alcohol use: Not Currently   • Drug use: Never   • Sexual activity: Defer       Review of Systems   Constitutional: Negative for unexpected weight change.   Respiratory: Negative for shortness of breath.    Cardiovascular: Negative for chest pain.   Gastrointestinal: Negative for abdominal pain and anal bleeding.       Objective     Vitals:    22 1437   BP: 118/68   Pulse: 82   Temp: 97 °F (36.1 °C)   SpO2: 97%   Weight: 112 kg (246 lb)   Height: 157.5 cm (62\")     Body mass index is 44.99 kg/m².    Physical Exam  Constitutional:       Appearance: Normal appearance. She is well-developed.   Eyes:      General: No scleral icterus.  Cardiovascular:      Rate and Rhythm: Regular rhythm.      Heart sounds: Normal heart sounds. No murmur heard.  Pulmonary:      Effort: Pulmonary effort is normal. No accessory muscle usage.      Breath " sounds: Normal breath sounds.   Abdominal:      General: Bowel sounds are normal. There is no distension.      Palpations: Abdomen is soft. There is no mass.      Tenderness: There is no abdominal tenderness. There is no guarding or rebound.   Skin:     General: Skin is warm and dry.      Coloration: Skin is not jaundiced.   Neurological:      Mental Status: She is alert.   Psychiatric:         Behavior: Behavior is cooperative.         Imaging Results (Most Recent)     None          Body mass index is 44.99 kg/m².    Assessment & Plan     Diagnoses and all orders for this visit:    1. Positive colorectal cancer screening using DNA-based stool test (Primary)  -     Case Request; Standing  -     Case Request        COLONOSCOPY WITH ANESTHESIA (N/A)    Miralax prep     Recommend daily use of Miralax, adjust as needed  Encouraged addition of dietary fiber, increasing daily water consumption, as well daily physical activity     Patient is to continue all blood pressure and cardiac medications prior to procedure and has been advised to take medications morning of procedure   Pt verbalized understanding    Pt to hold diabetes medication/insulin day of procedure to prevent any risk of complications of hypoglycemia intraprocedure.  If taking insulin 1/2 the PM dose as well     Advised pt to stop use of NSAIDs, Fish Oil, and MV 5 days prior to procedure, per Dr Neumann protocol.  Tylenol based products are ok to take.  Pt verbalized understanding.     I have explained a positive cologuard indicates the presence of DNA/hgb in stool that is associated with colon polyps or colon cancer.  There is a chance the test is a false positive with that chance being higher for people over the age of 65. This is due to normal changes in DNA associated with getting older (AGA).  Due to the positive result of the Cologuard I recommend pt undergoing colonoscopy.  Colonoscopy remains the gold standard for detection of colon polyps/colon  cancer.      All risks, benefits, alternatives, and indications of colonoscopy procedure have been discussed with the patient. Risks to include perforation of the colon requiring possible surgery or colostomy, risk of bleeding from biopsies or removal of colon tissue, possibility of missing a colon polyp or cancer, or adverse drug reaction.  Benefits to include the diagnosis and management of disease of the colon and rectum.  Pt verbalizes understanding and agrees to proceed with procedure.              Nader Cox, APRN  09/26/22            There are no Patient Instructions on file for this visit.

## 2022-09-27 ENCOUNTER — HOSPITAL ENCOUNTER (OUTPATIENT)
Dept: MAMMOGRAPHY | Facility: HOSPITAL | Age: 52
Discharge: HOME | End: 2022-09-27
Admitting: NURSE PRACTITIONER

## 2022-09-27 DIAGNOSIS — Z12.31 ENCOUNTER FOR SCREENING MAMMOGRAM FOR MALIGNANT NEOPLASM OF BREAST: ICD-10-CM

## 2022-09-27 PROBLEM — R19.5 POSITIVE COLORECTAL CANCER SCREENING USING DNA-BASED STOOL TEST: Status: ACTIVE | Noted: 2022-09-27

## 2022-09-27 PROCEDURE — 77067 SCR MAMMO BI INCL CAD: CPT

## 2022-09-27 PROCEDURE — 77063 BREAST TOMOSYNTHESIS BI: CPT

## 2022-10-14 ENCOUNTER — ANESTHESIA (OUTPATIENT)
Dept: GASTROENTEROLOGY | Facility: HOSPITAL | Age: 52
End: 2022-10-14

## 2022-10-14 ENCOUNTER — ANESTHESIA EVENT (OUTPATIENT)
Dept: GASTROENTEROLOGY | Facility: HOSPITAL | Age: 52
End: 2022-10-14

## 2022-10-14 ENCOUNTER — HOSPITAL ENCOUNTER (OUTPATIENT)
Facility: HOSPITAL | Age: 52
Setting detail: HOSPITAL OUTPATIENT SURGERY
Discharge: HOME | End: 2022-10-14
Attending: INTERNAL MEDICINE | Admitting: INTERNAL MEDICINE

## 2022-10-14 VITALS
HEART RATE: 66 BPM | BODY MASS INDEX: 49.47 KG/M2 | WEIGHT: 262 LBS | RESPIRATION RATE: 18 BRPM | HEIGHT: 61 IN | OXYGEN SATURATION: 98 % | SYSTOLIC BLOOD PRESSURE: 108 MMHG | TEMPERATURE: 97 F | DIASTOLIC BLOOD PRESSURE: 56 MMHG

## 2022-10-14 DIAGNOSIS — R19.5 POSITIVE COLORECTAL CANCER SCREENING USING DNA-BASED STOOL TEST: ICD-10-CM

## 2022-10-14 PROCEDURE — 25010000002 PROPOFOL 10 MG/ML EMULSION

## 2022-10-14 PROCEDURE — 45378 DIAGNOSTIC COLONOSCOPY: CPT | Performed by: INTERNAL MEDICINE

## 2022-10-14 RX ORDER — SODIUM CHLORIDE 9 MG/ML
100 INJECTION, SOLUTION INTRAVENOUS CONTINUOUS
Status: DISCONTINUED | OUTPATIENT
Start: 2022-10-14 | End: 2022-10-14 | Stop reason: HOSPADM

## 2022-10-14 RX ORDER — PROPOFOL 10 MG/ML
VIAL (ML) INTRAVENOUS AS NEEDED
Status: DISCONTINUED | OUTPATIENT
Start: 2022-10-14 | End: 2022-10-14 | Stop reason: SURG

## 2022-10-14 RX ORDER — SODIUM CHLORIDE 0.9 % (FLUSH) 0.9 %
10 SYRINGE (ML) INJECTION EVERY 12 HOURS SCHEDULED
Status: DISCONTINUED | OUTPATIENT
Start: 2022-10-14 | End: 2022-10-14 | Stop reason: HOSPADM

## 2022-10-14 RX ORDER — SODIUM CHLORIDE 0.9 % (FLUSH) 0.9 %
10 SYRINGE (ML) INJECTION AS NEEDED
Status: DISCONTINUED | OUTPATIENT
Start: 2022-10-14 | End: 2022-10-14 | Stop reason: HOSPADM

## 2022-10-14 RX ADMIN — PROPOFOL 250 MG: 10 INJECTION, EMULSION INTRAVENOUS at 11:56

## 2022-10-14 RX ADMIN — SODIUM CHLORIDE 100 ML/HR: 9 INJECTION, SOLUTION INTRAVENOUS at 11:28

## 2022-10-14 NOTE — ANESTHESIA POSTPROCEDURE EVALUATION
"Patient: Krys Cox    Procedure Summary     Date: 10/14/22 Room / Location: [facility] ENDOSCOPY 2 /  [facility]    Anesthesia Start: 1152 Anesthesia Stop: 1209    Procedure: COLONOSCOPY WITH ANESTHESIA Diagnosis:       Positive colorectal cancer screening using DNA-based stool test      (Positive colorectal cancer screening using DNA-based stool test [R19.5])    Surgeons: Tristen Neumann DO Provider: Malka Blunt CRNA    Anesthesia Type: MAC ASA Status: 3          Anesthesia Type: MAC    Vitals  Vitals Value Taken Time   /62 10/14/22 1231   Temp     Pulse 67 10/14/22 1235   Resp 20 10/14/22 1213   SpO2 99 % 10/14/22 1235   Vitals shown include unvalidated device data.        Post Anesthesia Care and Evaluation    Patient location during evaluation: PHASE II  Patient participation: complete - patient participated  Level of consciousness: awake  Pain management: adequate    Airway patency: patent  Anesthetic complications: No anesthetic complications    Cardiovascular status: acceptable  Respiratory status: acceptable  Hydration status: acceptable    Comments: Blood pressure 108/56, pulse 73, temperature 97 °F (36.1 °C), temperature source Temporal, resp. rate 20, height 154.9 cm (61\"), weight 119 kg (262 lb), last menstrual period 03/17/2020, SpO2 99 %, not currently breastfeeding.    Pt discharged from PACU based on kacie score >8      "

## 2022-10-14 NOTE — ANESTHESIA PREPROCEDURE EVALUATION
" Anesthesia Evaluation     Patient summary reviewed   no history of anesthetic complications:  NPO Solid Status: > 8 hours  NPO Liquid Status: > 8 hours           Airway   Mallampati: I  TM distance: >3 FB  Neck ROM: full  No difficulty expected  Dental      Pulmonary    (+) a smoker Former,   (-) COPD, asthma, sleep apnea  Cardiovascular   Exercise tolerance: poor (<4 METS)    (+) hypertension, past MI , CAD, CABG, CHF , hyperlipidemia,   (-) pacemaker    ROS comment: Echo:  · Left ventricular ejection fraction appears to be 56 - 60%. Left ventricular systolic function is normal.  · Left ventricular diastolic function is consistent with (grade I) impaired relaxation.  · Left atrial volume is severely increased.  · Estimated right ventricular systolic pressure from tricuspid regurgitation is normal (<35 mmHg).    Neuro/Psych  (-) seizures, TIA, CVA  GI/Hepatic/Renal/Endo    (+) morbid obesity, PUD,  diabetes mellitus using insulin, thyroid problem hypothyroidism  (-) liver disease, no renal disease    Musculoskeletal     Abdominal   (+) obese,    Substance History      OB/GYN          Other   arthritis,             From CT H&P:   \"Cardiology was consulted with heart cath planned but placed on hold due to a positive covid test on 3/12. She indicates she remained chest pain free that time. She was given oral vitamin C, D, and zinc. 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 She required an insulin gtt for hyperglycemia during that time. She was retested for covid on 3/16 and tested negative. Cardiac catheterization was completed on 3/17/2021 by Dr. Gavin. Findings identified multivessel coronary artery disease with depressed LV function (formal cath report not available for review). Dr. Gavin contacted Dr. Burgos with decision-making made to transfer her to [facility] for higher level of care. A 3rd covid test was performed prior to transfer and was " "reported to be negative as well. \"         Anesthesia Plan    ASA 3     MAC     (Insulin pump held )  intravenous induction     Anesthetic plan, risks, benefits, and alternatives have been provided, discussed and informed consent has been obtained with: patient.      "

## 2023-01-23 ENCOUNTER — OFFICE VISIT (OUTPATIENT)
Dept: CARDIOLOGY | Facility: CLINIC | Age: 53
End: 2023-01-23
Payer: COMMERCIAL

## 2023-01-23 VITALS
SYSTOLIC BLOOD PRESSURE: 128 MMHG | DIASTOLIC BLOOD PRESSURE: 60 MMHG | OXYGEN SATURATION: 98 % | WEIGHT: 270 LBS | BODY MASS INDEX: 50.98 KG/M2 | HEART RATE: 62 BPM | HEIGHT: 61 IN

## 2023-01-23 DIAGNOSIS — I25.10 CORONARY ARTERY DISEASE INVOLVING NATIVE CORONARY ARTERY OF NATIVE HEART WITHOUT ANGINA PECTORIS: Primary | ICD-10-CM

## 2023-01-23 DIAGNOSIS — I50.32 HYPERTENSIVE HEART DISEASE WITH CHRONIC DIASTOLIC CONGESTIVE HEART FAILURE: ICD-10-CM

## 2023-01-23 DIAGNOSIS — I11.0 HYPERTENSIVE HEART DISEASE WITH CHRONIC DIASTOLIC CONGESTIVE HEART FAILURE: ICD-10-CM

## 2023-01-23 DIAGNOSIS — I10 PRIMARY HYPERTENSION: ICD-10-CM

## 2023-01-23 PROCEDURE — 93000 ELECTROCARDIOGRAM COMPLETE: CPT | Performed by: INTERNAL MEDICINE

## 2023-01-23 PROCEDURE — 99214 OFFICE O/P EST MOD 30 MIN: CPT | Performed by: INTERNAL MEDICINE

## 2023-01-23 RX ORDER — CETIRIZINE HYDROCHLORIDE 10 MG/1
10 TABLET ORAL DAILY
COMMUNITY

## 2023-01-23 RX ORDER — SULINDAC 200 MG/1
200 TABLET ORAL 2 TIMES DAILY
COMMUNITY

## 2023-01-23 NOTE — PROGRESS NOTES
Subjective:     Encounter Date:01/23/2023      Patient ID: Krys Cox is a 53 y.o. female with coronary artery disease, status post previous CABG (LIMA to LAD, SVG to OM2, and SVG to PDA), on 3/19/2021 by Dr. Thomas, hypertension, hyperlipidemia, insulin requiring diabetes mellitus, diastolic dysfunction, who presents today for routine follow-up.    Chief Complaint: Here for routine follow-up of coronary artery disease    History of Present Illness    This patient presents today for routine follow-up.  She does have coronary artery disease with previous coronary artery bypass grafting in March 2021.  She notes no significant chest pain at this time.  Once while she will have a mild discomfort in the chest but nothing significant or consistent.  She denies having significant shortness of breath or dyspnea on exertion.  No lightheadedness, dizziness or syncope.  No orthopnea, PND edema.  Weight has been relatively stable.  Blood pressure is generally well controlled.  No side effects on current medications.  She does remain on aspirin 81 mg daily.  She has been diagnosed with rheumatoid arthritis and has a consultation with rheumatology at some point in the future.      Current Outpatient Medications:   •  Accu-Chek Guide test strip, , Disp: , Rfl:   •  aspirin 81 MG chewable tablet, Chew 81 mg Daily., Disp: , Rfl:   •  atorvastatin (LIPITOR) 80 MG tablet, Take 80 mg by mouth Every Night., Disp: , Rfl:   •  busPIRone (BUSPAR) 5 MG tablet, Take 5 mg by mouth 2 (Two) Times a Day., Disp: , Rfl:   •  carvedilol (Coreg) 3.125 MG tablet, Take 1 tablet by mouth 2 (Two) Times a Day With Meals., Disp: 30 tablet, Rfl: 0  •  cetirizine (zyrTEC) 10 MG tablet, Take 10 mg by mouth Daily., Disp: , Rfl:   •  furosemide (LASIX) 40 MG tablet, Take 1 tablet by mouth Daily., Disp: 40 tablet, Rfl: 0  •  insulin lispro (humaLOG) 100 UNIT/ML injection, Insulin Pump, Disp: , Rfl:   •  levothyroxine (SYNTHROID, LEVOTHROID) 150 MCG  tablet, Take 150 mcg by mouth Daily., Disp: , Rfl:   •  lisinopril (PRINIVIL,ZESTRIL) 20 MG tablet, Take 20 mg by mouth 2 (Two) Times a Day., Disp: , Rfl:   •  metOLazone (ZAROXOLYN) 2.5 MG tablet, Take 1 tablet by mouth Daily., Disp: 15 tablet, Rfl: 0  •  sertraline (ZOLOFT) 100 MG tablet, Take 100 mg by mouth Daily., Disp: , Rfl:   •  sulindac (CLINORIL) 200 MG tablet, Take 200 mg by mouth 2 (Two) Times a Day., Disp: , Rfl:   •  vitamin D3 125 MCG (5000 UT) capsule capsule, Take 10,000 Units by mouth Daily., Disp: , Rfl:     No Known Allergies    Social History     Tobacco Use   • Smoking status: Former     Packs/day: 1.00     Years: 35.00     Pack years: 35.00     Types: Cigarettes     Start date:      Quit date: 3/12/2021     Years since quittin.8   • Smokeless tobacco: Never   • Tobacco comments:     quit 2 weeks ago when hospitalized    Substance Use Topics   • Alcohol use: Not Currently     Review of Systems   Constitutional: Negative for fever and weight loss.   Cardiovascular: Negative for chest pain, dyspnea on exertion, leg swelling, orthopnea, palpitations, paroxysmal nocturnal dyspnea and syncope.   Respiratory: Negative for cough, shortness of breath and wheezing.    Musculoskeletal: Positive for arthritis and joint pain.   Gastrointestinal: Negative for abdominal pain, nausea and vomiting.   Neurological: Negative for dizziness, headaches and loss of balance.       ECG 12 Lead    Date/Time: 2023 9:55 AM  Performed by: Brady Valles MD  Authorized by: Brady Valles MD   Comparison: compared with previous ECG from 3/29/2022  Similar to previous ECG  Rhythm: sinus rhythm  Rate: normal  BPM: 63  Conduction: incomplete right bundle branch block  Q waves: V1, V2 and V3    QRS axis: left  Other findings: non-specific ST-T wave changes, left ventricular hypertrophy and poor R wave progression    Clinical impression: abnormal EKG             Objective:     Vitals reviewed.  "  Constitutional:       General: Not in acute distress.     Appearance: Well-developed and not in distress.   HENT:      Head: Normocephalic and atraumatic.   Pulmonary:      Effort: Pulmonary effort is normal.      Breath sounds: Normal breath sounds. No wheezing. No rhonchi. No rales.   Cardiovascular:      Normal rate. Regular rhythm.      Murmurs: There is no murmur.      No gallop.   Pulses:     Intact distal pulses.   Edema:     Peripheral edema absent.   Abdominal:      General: Abdomen is protuberant. Bowel sounds are normal. There is no distension.      Palpations: Abdomen is soft.      Tenderness: There is no abdominal tenderness.   Skin:     General: Skin is warm and dry. There is no cyanosis.      Findings: No erythema or rash.   Neurological:      Mental Status: Alert and oriented to person, place, and time.      Cranial Nerves: No cranial nerve deficit.       /60   Pulse 62   Ht 154.9 cm (61\")   Wt 122 kg (270 lb)   LMP 03/17/2020 (Approximate)   SpO2 98%   BMI 51.02 kg/m²     Data/Lab Review:     Lab Results   Component Value Date    CHOL 120 02/01/2022    TRIG 172 (H) 02/01/2022    HDL 46 02/01/2022    LDL 46 02/01/2022     Results for orders placed during the hospital encounter of 02/01/22    Adult Transthoracic Echo Complete W/ Cont if Necessary Per Protocol    Interpretation Summary  · Left ventricular ejection fraction appears to be 56 - 60%.  · Left ventricular wall thickness is consistent with mild concentric hypertrophy.  · Left ventricular diastolic function is consistent with (grade II w/high LAP) pseudonormalization.  · Left atrial volume is mildly increased.        Assessment:          Diagnosis Plan   1. Coronary artery disease involving native coronary artery of native heart without angina pectoris  ECG 12 Lead      2. Hypertensive heart disease with chronic diastolic congestive heart failure (HCC)        3. Primary hypertension               Plan:       1.  Coronary artery " disease: The patient remains stable.  No ischemic symptoms are described today.  The patient should continue aspirin, beta-blocker and statin therapies (previous LDL cholesterol goal, referenced above).     2.  Diastolic heart failure: Stable at this time.  No evidence of volume overload on exam.  Continue to monitor volume status.     3.  Essential hypertension: The patient's blood pressure is generally well controlled.  Continue to monitor.  Continue current medications.    Will see the patient again in 12 months unless otherwise needed sooner.

## 2023-02-15 ENCOUNTER — HOSPITAL ENCOUNTER (OUTPATIENT)
Dept: SLEEP CENTER | Age: 53
Discharge: HOME | End: 2023-02-17
Payer: MEDICAID

## 2023-02-15 PROCEDURE — G0399 HOME SLEEP TEST/TYPE 3 PORTA: HCPCS

## 2023-02-16 PROCEDURE — G0399 HOME SLEEP TEST/TYPE 3 PORTA: HCPCS

## 2023-02-25 DIAGNOSIS — G47.34 SLEEP RELATED HYPOXIA: ICD-10-CM

## 2023-02-25 DIAGNOSIS — G47.33 OSA (OBSTRUCTIVE SLEEP APNEA): Primary | ICD-10-CM

## 2023-03-08 ENCOUNTER — HOSPITAL ENCOUNTER (OUTPATIENT)
Dept: CT IMAGING | Facility: HOSPITAL | Age: 53
Discharge: HOME | End: 2023-03-08
Admitting: NURSE PRACTITIONER
Payer: COMMERCIAL

## 2023-03-08 ENCOUNTER — OFFICE VISIT (OUTPATIENT)
Dept: CARDIAC SURGERY | Facility: CLINIC | Age: 53
End: 2023-03-08
Payer: COMMERCIAL

## 2023-03-08 VITALS
DIASTOLIC BLOOD PRESSURE: 66 MMHG | HEART RATE: 73 BPM | BODY MASS INDEX: 50.98 KG/M2 | WEIGHT: 270 LBS | OXYGEN SATURATION: 96 % | SYSTOLIC BLOOD PRESSURE: 122 MMHG | HEIGHT: 61 IN

## 2023-03-08 DIAGNOSIS — R91.8 LUNG NODULES: ICD-10-CM

## 2023-03-08 DIAGNOSIS — R91.8 LUNG NODULES: Primary | ICD-10-CM

## 2023-03-08 DIAGNOSIS — E66.01 CLASS 3 SEVERE OBESITY DUE TO EXCESS CALORIES WITH SERIOUS COMORBIDITY AND BODY MASS INDEX (BMI) OF 40.0 TO 44.9 IN ADULT: ICD-10-CM

## 2023-03-08 DIAGNOSIS — Z87.891 FORMER SMOKER: ICD-10-CM

## 2023-03-08 DIAGNOSIS — R59.0 MEDIASTINAL LYMPHADENOPATHY: ICD-10-CM

## 2023-03-08 LAB — CREAT BLDA-MCNC: 0.9 MG/DL (ref 0.6–1.3)

## 2023-03-08 PROCEDURE — 99213 OFFICE O/P EST LOW 20 MIN: CPT | Performed by: THORACIC SURGERY (CARDIOTHORACIC VASCULAR SURGERY)

## 2023-03-08 PROCEDURE — 3078F DIAST BP <80 MM HG: CPT | Performed by: THORACIC SURGERY (CARDIOTHORACIC VASCULAR SURGERY)

## 2023-03-08 PROCEDURE — 1159F MED LIST DOCD IN RCRD: CPT | Performed by: THORACIC SURGERY (CARDIOTHORACIC VASCULAR SURGERY)

## 2023-03-08 PROCEDURE — 1160F RVW MEDS BY RX/DR IN RCRD: CPT | Performed by: THORACIC SURGERY (CARDIOTHORACIC VASCULAR SURGERY)

## 2023-03-08 PROCEDURE — 71260 CT THORAX DX C+: CPT

## 2023-03-08 PROCEDURE — 82565 ASSAY OF CREATININE: CPT

## 2023-03-08 PROCEDURE — 25510000001 IOPAMIDOL 61 % SOLUTION: Performed by: NURSE PRACTITIONER

## 2023-03-08 PROCEDURE — 3074F SYST BP LT 130 MM HG: CPT | Performed by: THORACIC SURGERY (CARDIOTHORACIC VASCULAR SURGERY)

## 2023-03-08 RX ADMIN — IOPAMIDOL 100 ML: 612 INJECTION, SOLUTION INTRAVENOUS at 08:35

## 2023-03-08 NOTE — PROGRESS NOTES
"Subjective   Chief Complaint   Patient presents with   • Coronary Artery Disease     1 year follow up with CT   • Lung Nodule     The patient is a 53-year-old female who presents for followup of lung nodules.    - Since her last visit, she has undergone carpal tunnel surgery on her left hand.  - She has been diagnosed with rheumatoid arthritis and has appointment pending with a provider in [place] in 04/2023.   - She has quit smoking.   - She is scheduled to undergo an overnight stay at the sleep center on 03/13/2023 for sleep apnea.  - She denies hemoptysis.   - She denies unintentional weight loss, and in fact, has gained weight.   - She denies new chest pain.   - She denies pneumonia.  - Endorses ankle and low back pain.   - Endorses shortness of breath with exertion.   - Ozempic was started last month, 02/2023.  - Consumes 1 Sprite Zero per day.  - Current BMI is 51.   - Needs to lose 165 pounds.   - She is not interested in bariatric surgery.    Review of Systems  Constitutional: Negative for chills, diaphoresis and fever.  Respiratory: Negative for shortness of breath except for with exertion.   Cardiovascular: Negative for chest pain.    Objective   Visit Vitals  /66 (BP Location: Right arm, Patient Position: Sitting, Cuff Size: Adult)   Pulse 73   Ht 154.9 cm (61\")   Wt 122 kg (270 lb)   LMP 03/17/2020 (Approximate)   SpO2 96%   BMI 51.02 kg/m²     Physical Exam  Vitals reviewed.   Constitutional:       General: She is not in acute distress.     Appearance: She is well-developed. She is obese. She is not diaphoretic.   HENT:      Head: Normocephalic.   Eyes:      Pupils: Pupils are equal, round, and reactive to light.   Neck:      Vascular: No JVD.   Cardiovascular:      Rate and Rhythm: Normal rate and regular rhythm.      Heart sounds: Normal heart sounds. No murmur heard.    No friction rub.   Pulmonary:      Effort: Pulmonary effort is normal. No respiratory distress.      Breath sounds: Normal " breath sounds. No stridor. No wheezing or rales.   Abdominal:      General: There is no distension.      Palpations: Abdomen is soft.      Tenderness: There is no abdominal tenderness.   Musculoskeletal:      Cervical back: Normal range of motion and neck supple.      Right lower leg: No edema.      Left lower leg: No edema.   Skin:     General: Skin is warm and dry.      Coloration: Skin is not pale.      Findings: No erythema or rash.   Neurological:      Mental Status: She is alert and oriented to person, place, and time.      Cranial Nerves: No cranial nerve deficit.   Psychiatric:         Behavior: Behavior normal.         Study Result  03/09/2022    Narrative & Impression   EXAM: CT CHEST W CONTRAST DIAGNOSTIC-     INDICATION: lung nodules; R91.8-Other nonspecific abnormal finding of  lung field     COMPARISON: 2/1/2022     DLP: 3/18/2021 mGy cm. Automated exposure control was also utilized to  decrease patient radiation dose.     FINDINGS:     No change in 4 mm subpleural RIGHT lower lobe pulmonary nodule on image  93. No change in 3 mm RIGHT upper lobe pulmonary nodule on image 34.  Calcified granuloma in the RIGHT lower lobe. No new or enlarging  pulmonary nodule. Atelectasis and/or scarring in the lingula, RIGHT  middle lobe, and LEFT lower lobe. No consolidation or pleural effusion.  Central airways are clear.     No enlarged axillary, supraclavicular, or mediastinal lymph nodes.  Borderline enlarged RIGHT hilar lymph node is unchanged. No central  pulmonary artery filling defect. Thoracic aorta is nonaneurysmal and  contains atherosclerotic calcification plaque postoperative change of  median sternotomy and CABG. No pericardial effusion. Small amount of  fluid in the superior pericardial recess noted.     No large thyroid nodule. No acute chest wall soft tissue abnormality.  Limited view the upper abdomen is unremarkable. No acute osseous  finding.     IMPRESSION:     No change in small 3 to 4 mm RIGHT  pulmonary nodules. Consider continued  imaging surveillance to document two-year stability.  This report was finalized on 03/09/2022 08:58 by Dr. Ken Vaughan MD.     Independent review is performed by me.  Her right lung nodules are stable.  They measure 3-4 mm in size.      CT CHEST W CONTRAST DIAGNOSTIC- 3/8/2023 8:25 AM CST     HISTORY: Lung nodule; R91.8-Other nonspecific abnormal finding of lung  field      COMPARISON: 03/09/2022      DLP: 358 mGy cm. All CT scans are performed using dose optimization  techniques as appropriate to the performed exam and including at least  one of the following: Automated exposure control, adjustment of the mA  and/or kV according to size, and the use of the iterative reconstruction  technique.     TECHNIQUE: Serial helical tomographic images of the chest were acquired  following the infusion of Isovue contrast intravenously. Bone and soft  tissue algorithms were provided.       FINDINGS:   Neck base: The imaged portion of the neck and thyroid gland is  unremarkable.      Lungs: A 2 mm nodule in the anterior segment of the right upper lobe on  image 39 of series 3 shows interval diminishment in size.   There is linear scarring in the periphery of the left upper lobe.   There is a 3 mm suspected intrafissural node associated with the minor  fissure on image 76 of series 3 stable from the previous study.   The linear atelectasis or scarring within the left upper lobe extends  into the lingular segment stable from the prior study.   A 4 mm subpleural nodule within the medial right lower lobe on image 96  of series 3 is stable.   There is focal atelectasis or scarring within the medial segment of the  right middle lobe stable from the prior study.   There is evidence of remote granulomatous disease.   There is mild left basilar atelectasis.   No new nodules are present.   No evidence of acute lobar pneumonia..   No pleural effusion is present.   The trachea and bronchial tree are  patent.      Heart: No evidence of cardiac enlargement.. There is no pericardial  effusion.      Vasculature: There is aortic atherosclerosis without stenosis or  aneurysm. Calcifications are seen in the coronary arteries. There is  atherosclerosis in the great vessels without definite stenosis. The  pulmonary arteries are normal in appearance.     Lymph nodes: Prevascular and right paratracheal as well as AP window  nodes are again demonstrated stable to slightly diminished in size from  the previous exam. I suspect these are reactive in nature. No evidence  of axillary adenopathy..      Bones and soft tissues: No acute osseous or soft tissue abnormality is  seen. There are no worrisome osseous lesions.      Upper abdomen: There is diffuse steatosis of the liver. The adrenals are  unremarkable..      IMPRESSION:  1. Stable pulmonary nodularity when compared to the previous exam. There  is one nodule in the anterior right upper lobe which has shown slight  interval decrease in size. No new lung nodules are present. There is  persistent linear scarring or atelectasis within both lungs as described  above. No evidence of acute consolidative pneumonia or effusion.  2. No evidence of cardiomegaly. Coronary calcifications are present as  well as atheromatous calcification of the thoracic aorta and great  vessels. No evidence of dissection or aneurysm. Stable to slightly  improved mediastinal adenopathy. These nodes may be reactive in nature.  3. Diffuse steatosis of the liver..        This report was finalized on 03/08/2023 08:56 by Dr. Dick Wu MD.    Independent interpretation of a CT scan performed on 03/08/2023 and compared to previous CT scan of the chest obtained on 03/09/2022 is performed by me. The right lower lobe lung nodule previously measured 5.9 mm in size, now measures 5.6 cm in size. Its morphology is stable. The right upper lobe nodule measures 3 mm in size and remains stable. Her adenopathy  remains present but essentially decreased in size.    Assessment & Plan     Diagnoses and all orders for this visit:    1. Lung nodules (Primary)  -     CT chest w contrast; Future    2. Class 3 severe obesity due to excess calories with serious comorbidity and body mass index (BMI) of 40.0 to 44.9 in adult  -     Ambulatory Referral to Bariatric Surgery    3. Former smoker    4. Mediastinal lymphadenopathy            Impression:  1. Right upper lobe nodule, 3 mm in size, stable.  2. Right lower lobe nodule, slightly decreased in size, measuring 5.6 mm in size.  3. Mediastinal adenopathy, decreasing in size.  4. Morbid obesity.  5. Known coronary artery disease, post bypass grafting.    Medical decision making/Recommendations/plan:  Dr. Valles, many thanks for the opportunity to aid in the care of your patient. She returns to see me today while having initially had coronary bypass grafting now for known lung nodules and mediastinal lymphadenopathy. She remains asymptomatic from both of these pathology findings. I recommend annual screening of her lung nodule, but her adenopathy remains under surveillance. Therefore, a CT scan of the chest with contrast is advised. She will return to see Karina Bar NP, in 1 year. We discussed signs and symptoms which would prompt an earlier assessment. Separately, in trying to be holistic in her care and acknowledging her greatest risk for malignancy at this time, now that she has quit smoking, is obesity. We had a long discussion as to the potential benefits of a bariatric consultation. She is not excited about surgery. I tried to reinforce her own concerns. That being said, with ongoing obesity, and in fact, now with increasing weight despite efforts to lose weight, she would likely benefit from the services of Dr. Becerril and team. I discussed the structure including that it is not mandatory just because you see the bariatric surgeon that she is to have surgery. I discussed  that she can take advantage of the resources including dietary, psychology, and directed medical focused care so that she can be successful with weight loss. I discussed that in the end if she is unable to achieve the weight in a medical fashion, that surgical solutions are effective from numerous points of view. She is agreeable to at least see Dr. Becerril and see what the program is about. We will make a referral.     She is a non-smoker and was congratulated on this.     I will continue to keep you apprised of care as it ensues.     Transcribed from ambient dictation for Vicente Thomas MD by Leta Nolan.  03/08/23   12:37 CST    Patient or patient representative verbalized consent to the visit recording.  I have personally performed the services described in this document as transcribed by the above individual, and it is both accurate and complete.

## 2023-03-13 ENCOUNTER — HOSPITAL ENCOUNTER (OUTPATIENT)
Dept: SLEEP CENTER | Age: 53
Discharge: HOME | End: 2023-03-15
Payer: MEDICAID

## 2023-03-13 PROCEDURE — 95811 POLYSOM 6/>YRS CPAP 4/> PARM: CPT

## 2023-03-14 NOTE — PROGRESS NOTES
[facility]  [address]  Phone [phone] Fax [phone]     Sleep Study Technician Review    Patient Name:  Maria Elena Perales  :   1970  Referring Provider: Kiera Medrano    Brief History:  Maria Elena Perales is a 47 y.o. female with a history of Diabetes, Hypertion and Heart bipass who has been referred for a titration. Pt has had a HST and her AHI was 80.5. Pt had several obstruction and hypopnea events. Height:  5' 2\"  Weight: 277 lbs  BMI: 52.3  Neck Circ: 17  Mallampati 4  ESS:     Type of Study:  C PAP titration  Time Stage Position Snore Hypopnea Obs Apnea Nell Apnea PAP O2   2200 1 Supine No Yes No No Cpap 6 RA   2300 2 Supine No No No No Cpap 11 RA   2400 2 Supine No No No No Cpap 11 RA   0100 2 Left No No No No Cpap 11 RA   0200 REM Supine No No No No Cpap 11 RA   0300 2 Supine No No No No Cpap 11 RA   0400 REM Supine No No No No Cpap 14 RA   0420 Awake Supine No No No No Cpap 14 RA                Summary: Pt had a HST and scored an 80 AHI. Pt stated that she did snore. Pt tolerated mask and pressure well. DME: [employer]     Final PAP settings: Cpap 14  Mask Type: Full Face  Mask: Vitera   Mask Size: medium    The study was reviewed briefly with Maria Elena Perales. She will be notified of the formal results and recommendations after the study is scored and interpreted. The report will be sent to his referring provider.     Technician:  HUANG Francis

## 2023-03-24 DIAGNOSIS — G47.33 OSA (OBSTRUCTIVE SLEEP APNEA): Primary | ICD-10-CM

## 2023-04-08 PROBLEM — R59.0 MEDIASTINAL LYMPHADENOPATHY: Status: ACTIVE | Noted: 2023-04-08

## 2023-11-20 NOTE — PROGRESS NOTES
"Subjective   Chief Complaint   Patient presents with   • Post-op Follow-up     CABG x3 on 3/19/2021     Patient ID: Krys Cox is a 51 y.o. female who is here for follow-up having had Coronary artery bypass grafting-3 vessel (left internal mammary artery/left anterior descending, reverse saphenous vein graft/second obtuse marginal, and reverse saphenous vein graft/posterior descending artery), bilateral open vein harvests, Prevena Incision management system performed on 3/19/2021 by Dr. Thomas.    History of Present Illness  Post operative recovery was uneventful without any major complications. She returns today for wound check. She is joined by her . Sleep habits are good. Pain control has been good. She does have \"a little achiness\" in right and left breasts. Thinks this could be from doing laundry or picking up grandkid that weighs 15 pounds. No fevers/sweats/chills. No sternal clicks. No cardiac chest pain or shortness of breath. Appetite is good. Blood glucoses are improved with highest of 186 recently. She has been started on metformin by PCP. She remains smoke free! Ambulating 15 minutes twice daily. Follows with Dr. Gavin. Has not started cardiac rehab as she does not have medical insurance to do so.     The following portions of the patient's history were reviewed and updated as appropriate: allergies, current medications, past family history, past medical history, past social history, past surgical history and problem list.    Review of Systems   Constitutional: Negative for chills, diaphoresis and fever.   Respiratory: Negative for shortness of breath and wheezing.    Cardiovascular: Negative for chest pain, palpitations and leg swelling.   Gastrointestinal: Negative for constipation, diarrhea, nausea and vomiting.   Neurological: Negative for weakness.       Objective   Visit Vitals  /84 (BP Location: Left arm, Patient Position: Sitting, Cuff Size: Large Adult)   Pulse 67   Ht " Medication: rosuvastatin  Last office visit date: 12/1/22  Next appointment scheduled?: No; telephone encounter sent to PSRs to schedule follow-up appointment   Number of refills given: 0   "157.5 cm (62.01\")   Wt 108 kg (239 lb)   LMP 03/17/2020 (Approximate)   SpO2 99%   BMI 43.70 kg/m²       Physical Exam  Vitals reviewed.   Constitutional:       General: She is not in acute distress.     Appearance: She is well-developed. She is obese. She is not diaphoretic.   HENT:      Head: Normocephalic.   Eyes:      Pupils: Pupils are equal, round, and reactive to light.   Neck:      Vascular: No JVD.   Cardiovascular:      Rate and Rhythm: Normal rate and regular rhythm.      Heart sounds: Normal heart sounds. No murmur heard.   No friction rub.   Pulmonary:      Effort: Pulmonary effort is normal. No respiratory distress.      Breath sounds: Normal breath sounds. No stridor. No wheezing or rales.   Abdominal:      General: There is no distension.      Palpations: Abdomen is soft.      Tenderness: There is no abdominal tenderness.   Musculoskeletal:      Cervical back: Normal range of motion and neck supple.      Right lower leg: Edema (trace ) present.      Left lower leg: No edema.   Skin:     General: Skin is warm and dry.      Coloration: Skin is not pale.      Findings: No erythema or rash.      Comments: Sternotomy site clean and dry. Sternum stable. No clicks. Right saphenectomy site clean and dry with tiny scab at most distal site. Left saphenectomy site clean and dry. No drainage and no evidence of cellulitis. Previous thermal injury to left neck nearly healed.    Neurological:      Mental Status: She is alert and oriented to person, place, and time.      Cranial Nerves: No cranial nerve deficit.   Psychiatric:         Behavior: Behavior normal.         Assessment/Plan         Diagnoses and all orders for this visit:    1. NSTEMI (non-ST elevated myocardial infarction) (CMS/HCC) (Primary)    2. Coronary artery disease involving native coronary artery of native heart with unstable angina pectoris (CMS/HCC)    3. Poorly controlled diabetes mellitus (CMS/HCC)    4. Class 3 severe obesity due to excess " calories with serious comorbidity and body mass index (BMI) of 40.0 to 44.9 in adult (CMS/[facility])    5. Lung nodules    6. Former smoker         Overall, Krys Cox is doing well. Surgical sites are clean and dry without evidence of infection. Continue advancement of sternotomy precautions as previously discussed. Recommend continued smoking cessation, glycemic control, and efforts to ensure a suitable weight for age and height. She will follow up in March 2022 with CT scan chest for finding of incidental lung nodules. We discussed signs and symptoms of malignancy. Provided support and encouragement. All questions have been answered to the best of my ability. Patient has been instructed to contact our office with any questions or concerns should they arise prior to the next office visit.     Patient's Body mass index is 43.7 kg/m². BMI is above normal parameters. Recommendations include: educational material and referral to primary care.    We discussed the need to discontinue smoking as this reduces the overall graft patency.   I have congratulated Krys Cox on successful smoking cessation thus far. Krys Cox  reports that she quit smoking about 2 months ago. Her smoking use included cigarettes. She started smoking about 35 years ago. She has a 30.00 pack-year smoking history. She has never used smokeless tobacco. I have educated her on the risk of diseases from using tobacco products such as cancer, COPD and heart disease.      Advance Care Planning   ACP discussion was declined by the patient. n/a as patient is under 65 years of age.

## 2024-01-24 ENCOUNTER — OFFICE VISIT (OUTPATIENT)
Dept: CARDIOLOGY | Facility: CLINIC | Age: 54
End: 2024-01-24
Payer: COMMERCIAL

## 2024-01-24 VITALS
DIASTOLIC BLOOD PRESSURE: 62 MMHG | SYSTOLIC BLOOD PRESSURE: 134 MMHG | HEART RATE: 60 BPM | BODY MASS INDEX: 45.88 KG/M2 | OXYGEN SATURATION: 97 % | WEIGHT: 243 LBS | HEIGHT: 61 IN

## 2024-01-24 DIAGNOSIS — E78.2 MIXED HYPERLIPIDEMIA: ICD-10-CM

## 2024-01-24 DIAGNOSIS — I10 PRIMARY HYPERTENSION: ICD-10-CM

## 2024-01-24 DIAGNOSIS — I25.10 CORONARY ARTERY DISEASE INVOLVING NATIVE CORONARY ARTERY OF NATIVE HEART WITHOUT ANGINA PECTORIS: Primary | ICD-10-CM

## 2024-01-24 PROCEDURE — 1159F MED LIST DOCD IN RCRD: CPT | Performed by: INTERNAL MEDICINE

## 2024-01-24 PROCEDURE — 1160F RVW MEDS BY RX/DR IN RCRD: CPT | Performed by: INTERNAL MEDICINE

## 2024-01-24 PROCEDURE — 3078F DIAST BP <80 MM HG: CPT | Performed by: INTERNAL MEDICINE

## 2024-01-24 PROCEDURE — 93000 ELECTROCARDIOGRAM COMPLETE: CPT | Performed by: INTERNAL MEDICINE

## 2024-01-24 PROCEDURE — 99214 OFFICE O/P EST MOD 30 MIN: CPT | Performed by: INTERNAL MEDICINE

## 2024-01-24 PROCEDURE — 3075F SYST BP GE 130 - 139MM HG: CPT | Performed by: INTERNAL MEDICINE

## 2024-01-24 RX ORDER — MULTIVITAMIN WITH IRON
100 TABLET ORAL DAILY
COMMUNITY

## 2024-01-24 RX ORDER — GABAPENTIN 300 MG/1
300 CAPSULE ORAL 2 TIMES DAILY
COMMUNITY

## 2024-01-24 RX ORDER — SPIRONOLACTONE 50 MG/1
50 TABLET, FILM COATED ORAL DAILY
COMMUNITY

## 2024-01-24 NOTE — PROGRESS NOTES
Subjective:     Encounter Date:01/24/2024      Patient ID: Krys Cox is a 54 y.o. female with coronary artery disease, status post previous CABG (LIMA to LAD, SVG to OM2, and SVG to PDA) on 3/19/2021 by Dr. Thomas, hypertension, hyperlipidemia, insulin requiring diabetes mellitus, diastolic dysfunction, who presents today for routine follow-up.     Chief Complaint: Here for follow-up of CAD    History of Present Illness    This patient presents today for routine follow-up.  She does have coronary artery disease with prior coronary artery bypass grafting.  At this time, she denies have any chest pain.  She also denies any palpitations, lightheadedness, dizziness or syncope.  She denies having any side effects or problems from any of her medications.  To her knowledge, her blood pressure has been reasonably well-controlled.  Her primary care provider noted that it was somewhat low.  However, the patient was not symptomatic at that time and her blood pressure today is well-controlled.  No orthopnea, PND, significant edema.  Overall, the patient says that she seems to be doing reasonably well.      Current Outpatient Medications:     Accu-Chek Guide test strip, , Disp: , Rfl:     aspirin 81 MG chewable tablet, Chew 1 tablet Daily., Disp: , Rfl:     atorvastatin (LIPITOR) 80 MG tablet, Take 1 tablet by mouth Every Night., Disp: , Rfl:     busPIRone (BUSPAR) 5 MG tablet, Take 1 tablet by mouth 2 (Two) Times a Day., Disp: , Rfl:     carvedilol (Coreg) 3.125 MG tablet, Take 1 tablet by mouth 2 (Two) Times a Day With Meals., Disp: 30 tablet, Rfl: 0    cetirizine (zyrTEC) 10 MG tablet, Take 1 tablet by mouth Daily., Disp: , Rfl:     furosemide (LASIX) 40 MG tablet, Take 1 tablet by mouth Daily., Disp: 40 tablet, Rfl: 0    gabapentin (NEURONTIN) 300 MG capsule, Take 1 capsule by mouth 2 (Two) Times a Day., Disp: , Rfl:     insulin lispro (humaLOG) 100 UNIT/ML injection, Insulin Pump, Disp: , Rfl:     levothyroxine  (SYNTHROID, LEVOTHROID) 150 MCG tablet, Take 1 tablet by mouth Daily., Disp: , Rfl:     lisinopril (PRINIVIL,ZESTRIL) 20 MG tablet, Take 1 tablet by mouth 2 (Two) Times a Day., Disp: , Rfl:     metOLazone (ZAROXOLYN) 2.5 MG tablet, Take 1 tablet by mouth Daily., Disp: 15 tablet, Rfl: 0    sertraline (ZOLOFT) 100 MG tablet, Take 1 tablet by mouth Daily., Disp: , Rfl:     spironolactone (ALDACTONE) 50 MG tablet, Take 1 tablet by mouth Daily., Disp: , Rfl:     sulindac (CLINORIL) 200 MG tablet, Take 1 tablet by mouth 2 (Two) Times a Day., Disp: , Rfl:     Tirzepatide (Mounjaro) 10 MG/0.5ML solution pen-injector pen, Inject 0.5 mL under the skin into the appropriate area as directed 1 (One) Time Per Week., Disp: , Rfl:     vitamin B-6 (PYRIDOXINE) 100 MG tablet, Take 1 tablet by mouth Daily., Disp: , Rfl:     vitamin D3 125 MCG (5000 UT) capsule capsule, Take 2 capsules by mouth Daily., Disp: , Rfl:     Allergies   Allergen Reactions    Ct Contrast Itching     Social History     Tobacco Use    Smoking status: Former     Packs/day: 1.00     Years: 35.00     Additional pack years: 0.00     Total pack years: 35.00     Types: Cigarettes     Start date:      Quit date: 3/12/2021     Years since quittin.8    Smokeless tobacco: Never    Tobacco comments:     quit 2 weeks ago when hospitalized    Substance Use Topics    Alcohol use: Not Currently     Comment: 0     Review of Systems   Constitutional: Negative for fever and weight loss.   Cardiovascular:  Negative for chest pain, dyspnea on exertion, leg swelling, orthopnea, palpitations, paroxysmal nocturnal dyspnea and syncope.   Respiratory:  Negative for cough, shortness of breath and wheezing.    Gastrointestinal:  Negative for abdominal pain, nausea and vomiting.   Neurological:  Negative for dizziness, headaches and loss of balance.         ECG 12 Lead    Date/Time: 2024 3:17 PM  Performed by: Brady Valles MD    Authorized by: Brady Valles  "MD Jens  Comparison: compared with previous ECG from 1/23/2023  Similar to previous ECG  Rhythm: sinus rhythm  Rate: normal  BPM: 68  Conduction: left anterior fascicular block  QRS axis: left  Other findings: non-specific ST-T wave changes and poor R wave progression    Clinical impression: abnormal EKG             Objective:     Vitals reviewed.   Constitutional:       General: Not in acute distress.     Appearance: Well-developed and not in distress.   Eyes:      Extraocular Movements: Extraocular movements intact.   HENT:      Head: Normocephalic and atraumatic.   Pulmonary:      Effort: Pulmonary effort is normal.      Breath sounds: Normal breath sounds. No wheezing. No rhonchi. No rales.   Cardiovascular:      Normal rate. Regular rhythm.      Murmurs: There is no murmur.      No gallop.    Pulses:     Intact distal pulses.   Edema:     Peripheral edema absent.   Abdominal:      General: Bowel sounds are normal. There is no distension.      Palpations: Abdomen is soft.      Tenderness: There is no abdominal tenderness.   Skin:     General: Skin is warm and dry. There is no cyanosis.      Findings: No erythema or rash.   Neurological:      Mental Status: Alert and oriented to person, place, and time.      Cranial Nerves: No cranial nerve deficit.       /62   Pulse 60   Ht 154.9 cm (61\")   Wt 110 kg (243 lb)   LMP 03/17/2020 (Approximate)   SpO2 97%   BMI 45.91 kg/m²     Data/Lab Review:     Lab Results   Component Value Date    CHOL 120 02/01/2022    TRIG 172 (H) 02/01/2022    HDL 46 02/01/2022    LDL 46 02/01/2022     Results for orders placed during the hospital encounter of 02/01/22    Adult Transthoracic Echo Complete W/ Cont if Necessary Per Protocol    Interpretation Summary  · Left ventricular ejection fraction appears to be 56 - 60%.  · Left ventricular wall thickness is consistent with mild concentric hypertrophy.  · Left ventricular diastolic function is consistent with (grade II w/high " LAP) pseudonormalization.  · Left atrial volume is mildly increased.        Assessment:          Diagnosis Plan   1. Coronary artery disease involving native coronary artery of native heart without angina pectoris  ECG 12 Lead      2. Primary hypertension        3. Mixed hyperlipidemia               Plan:       1.  Coronary artery disease: The patient is stable at this time and does not describe any anginal symptoms.  The patient does remain on aspirin, carvedilol, atorvastatin.  No further testing at this time as the patient is otherwise asymptomatic.  Continue current medical therapy.     2.  Primary hypertension: The patient's blood pressure remains well-controlled at this time.  Continue to monitor.  Continue current medications with carvedilol, Lasix, lisinopril, spironolactone.    3.  Mixed hyperlipidemia: The last LDL cholesterol that I have access to is referenced above, showing this to be at goal of less than 70.  The patient does continue high intensity statin therapy and she is tolerating this well.  No changes would be recommended at this time.     We will plan to see the patient again in 1 year unless otherwise needed sooner.

## 2024-02-06 ENCOUNTER — TELEPHONE (OUTPATIENT)
Dept: CARDIAC SURGERY | Facility: CLINIC | Age: 54
End: 2024-02-06
Payer: COMMERCIAL

## 2024-02-06 NOTE — TELEPHONE ENCOUNTER
Pt confirmed she uses [facility] in [place] for her pharmacy. She is aware of appointment information.

## 2024-02-07 RX ORDER — PREDNISONE 50 MG/1
50 TABLET ORAL EVERY 6 HOURS
Qty: 3 TABLET | Refills: 0 | Status: SHIPPED | OUTPATIENT
Start: 2024-02-07

## 2024-03-05 ENCOUNTER — TELEPHONE (OUTPATIENT)
Dept: CARDIAC SURGERY | Facility: CLINIC | Age: 54
End: 2024-03-05
Payer: COMMERCIAL

## 2024-03-05 NOTE — TELEPHONE ENCOUNTER
Called to set up a time for Dr. Thomas to P2P. [facility] requests call back when physician is ready to be connected immediately. Informed representative we will plan on calling tomorrow when Dr. Thomas is ready for P2P and she voiced understanding.

## 2024-03-05 NOTE — TELEPHONE ENCOUNTER
Per Daxa with financial clearance, she is trying to get CT authorized, however this has been denied due to size being less than 6 mm. See copied and pasted note from Daxa below:    I CALLED [facility] AND SPOKE TO ELDER BRANCH. SHE STATES THAT THE DATES WERE WRONG IN THE DENIAL LETTER, BUT CASE WILL STILL BE DENIED DUE TO SIXE OF NODULE BRING LESS THAN 6MM. SHE STATE A CXR WOULD NEED TO BE DONE FIRST. IT THAT SHOWS AT 6MM OR GREATER, WE    I CALLED [facility] AND SPOKE TO ELDER BRANCH. SHE STATES THAT THE DATES WERE WRONG IN THE DENIAL LETTER, BUT CASE  WILL STILL BE DENIED DUE TO SIXE OF NODULE BRING LESS THAN 6MM. SHE STATE A CXR WOULD NEED TO BE DONE FIRST. IT THAT SHOWS AT 6MM OR GREATER, WE CAN TRY FOR CT CHEST AGAIN, OTHERWISE IT WILL NOT MEET GUIDELINES. SHE DID STAT  THAT THE MD IS WELCOME TO DO A P2P BY CALLING [phone] AND USING TRACKING NUMBER [phone]      *Daxa will need to be notified no later than 2 PM CST of decision of insurance. Daxa can be reached at [phone] or we can message her in [email or web address].

## 2024-03-06 DIAGNOSIS — R91.8 LUNG NODULES: Primary | ICD-10-CM

## 2024-03-07 ENCOUNTER — HOSPITAL ENCOUNTER (OUTPATIENT)
Dept: CT IMAGING | Facility: HOSPITAL | Age: 54
Discharge: HOME | End: 2024-03-07
Payer: COMMERCIAL

## 2024-03-07 NOTE — TELEPHONE ENCOUNTER
New order placed as previous one . New appt date with Karina Bar for 3/28 and CT same day. Patient aware of these appt dates/times and instructions per Julita. Will chart check for auth/p2p if needed.

## 2024-03-18 NOTE — TELEPHONE ENCOUNTER
"New appointment 03/28/2024    Daxa [facility] [employer] called re: this as appointment was canceled and pushed out for P2P. This will likely be denied again. If denied, they will provide a 24 hour window to do P2P. Daxa has already sent this to insurance to approve/deny. If denied, per Daxa -  P2P info: NEW TRACKING NUMBER [id number] PHONE NUMBER [phone]\"  "

## 2024-03-28 ENCOUNTER — OFFICE VISIT (OUTPATIENT)
Dept: CARDIAC SURGERY | Facility: CLINIC | Age: 54
End: 2024-03-28
Payer: COMMERCIAL

## 2024-03-28 ENCOUNTER — HOSPITAL ENCOUNTER (OUTPATIENT)
Dept: CT IMAGING | Facility: HOSPITAL | Age: 54
Discharge: HOME | End: 2024-03-28
Payer: COMMERCIAL

## 2024-03-28 VITALS
DIASTOLIC BLOOD PRESSURE: 66 MMHG | BODY MASS INDEX: 46.26 KG/M2 | WEIGHT: 245 LBS | SYSTOLIC BLOOD PRESSURE: 118 MMHG | HEIGHT: 61 IN | HEART RATE: 97 BPM | OXYGEN SATURATION: 97 %

## 2024-03-28 DIAGNOSIS — Z87.891 FORMER SMOKER: ICD-10-CM

## 2024-03-28 DIAGNOSIS — R59.0 MEDIASTINAL LYMPHADENOPATHY: ICD-10-CM

## 2024-03-28 DIAGNOSIS — R91.8 LUNG NODULES: Primary | ICD-10-CM

## 2024-03-28 DIAGNOSIS — R91.8 LUNG NODULES: ICD-10-CM

## 2024-03-28 PROCEDURE — 25510000001 IOPAMIDOL 61 % SOLUTION

## 2024-03-28 PROCEDURE — 71260 CT THORAX DX C+: CPT

## 2024-03-28 RX ORDER — CYCLOBENZAPRINE HCL 5 MG
TABLET ORAL AS NEEDED
COMMUNITY
Start: 2024-02-05

## 2024-03-28 RX ORDER — HYDROXYCHLOROQUINE SULFATE 200 MG/1
200 TABLET, FILM COATED ORAL 2 TIMES DAILY
COMMUNITY
Start: 2024-03-26

## 2024-03-28 RX ORDER — ONDANSETRON 4 MG/1
TABLET, FILM COATED ORAL AS NEEDED
COMMUNITY
Start: 2024-02-05

## 2024-03-28 RX ORDER — DIPHENHYDRAMINE HCL 25 MG
25 CAPSULE ORAL
COMMUNITY
Start: 2024-02-07

## 2024-03-28 RX ORDER — IPRATROPIUM BROMIDE 42 UG/1
SPRAY, METERED NASAL AS NEEDED
COMMUNITY
Start: 2024-02-02

## 2024-03-28 RX ORDER — ISOPROPYL ALCOHOL 70 ML/100ML
LIQUID TOPICAL AS NEEDED
COMMUNITY
Start: 2024-02-05

## 2024-03-28 RX ORDER — PROMETHAZINE HYDROCHLORIDE 25 MG/1
TABLET ORAL AS NEEDED
COMMUNITY
Start: 2024-02-05

## 2024-03-28 RX ADMIN — IOPAMIDOL 100 ML: 612 INJECTION, SOLUTION INTRAVENOUS at 11:38

## 2024-03-28 NOTE — PROGRESS NOTES
Chief Complaint   Patient presents with    Mediastinal lymphadenopathy    Lung Nodule         Subjective     History of Present Illness  Ms. Cox is a 54-year-old female who presents today in follow-up of a lung nodule and mediastinal lymphadenopathy.  She underwent coronary artery bypass grafting by Dr. Thomas in 2021.  During preoperative testing a CT angiogram of the chest was performed revealing pulmonary nodules measuring up to 4 mm.  This has been followed in our office since.  Today she reports that she is overall feeling well.  She denies any new health issues or concerns.  She is accompanied by her .  She denies any new or worsening shortness of breath.  She denies chest pain, hemoptysis, unintentional weight loss or persistent cough.  She is a non-smoker who quit smoking approximately 3 years ago.        Review of Systems   Constitutional:  Negative for chills, fatigue, fever and unexpected weight change.   HENT:  Negative for sore throat and trouble swallowing.    Respiratory:  Positive for shortness of breath (Baseline with exertion).    Cardiovascular:  Negative for chest pain, palpitations and leg swelling.   Musculoskeletal:  Negative for back pain.   Skin:  Negative for color change, pallor and rash.          Past Medical History:   Diagnosis Date    Arthritis     CHF (congestive heart failure)     Diastolic    Coronary artery disease     Diabetes mellitus     Disease of thyroid gland     Elevated cholesterol     Hypertension      Past Surgical History:   Procedure Laterality Date    CARDIAC CATHETERIZATION      CARPAL TUNNEL RELEASE       SECTION      she has had 4    COLONOSCOPY N/A 10/14/2022    Procedure: COLONOSCOPY WITH ANESTHESIA;  Surgeon: Tristen Neumann DO;  Location: [facility] ENDOSCOPY;  Service: Gastroenterology;  Laterality: N/A;  pre screen  post normal  Karolina Toney    CORONARY ARTERY BYPASS GRAFT N/A 2021    Procedure: CORONARY ARTERY BYPASS GRAFT X 3  WITH LEFT INTERNAL MAMMARY ARTERY, BILATERAL LOWER EXTREMITY OPEN VEIN HARVEST, AND PERFUSION; APPLICATION OF PREVENA INCISIONAL WOUND VAC X 3; TRANSESOPHAGEAL ECHOCARDIOGRAM;  Surgeon: Vicente Thomas MD;  Location: [facility] OR;  Service: Cardiothoracic;  Laterality: N/A;    DILATATION AND CURETTAGE      TUBAL ABDOMINAL LIGATION       Family History   Problem Relation Age of Onset    Hypertension Mother     Clotting disorder Mother     Hyperlipidemia Sister     Clotting disorder Maternal Grandmother     Breast cancer Maternal Aunt     Colon polyps Maternal Aunt     Breast cancer Maternal Cousin     Colon cancer Neg Hx     Esophageal cancer Neg Hx      Social History     Tobacco Use    Smoking status: Former     Current packs/day: 0.00     Average packs/day: 1 pack/day for 35.2 years (35.2 ttl pk-yrs)     Types: Cigarettes     Start date: 1986     Quit date: 3/12/2021     Years since quitting: 3.0     Passive exposure: Past    Smokeless tobacco: Never   Vaping Use    Vaping status: Never Used   Substance Use Topics    Alcohol use: Not Currently     Comment: 0    Drug use: Never     Current Outpatient Medications   Medication Sig Dispense Refill    Accu-Chek Guide test strip       aspirin 81 MG chewable tablet Chew 1 tablet Daily.      atorvastatin (LIPITOR) 80 MG tablet Take 1 tablet by mouth Every Night.      busPIRone (BUSPAR) 5 MG tablet Take 1 tablet by mouth 2 (Two) Times a Day.      carvedilol (Coreg) 3.125 MG tablet Take 1 tablet by mouth 2 (Two) Times a Day With Meals. 30 tablet 0    cetirizine (zyrTEC) 10 MG tablet Take 1 tablet by mouth Daily.      cyclobenzaprine (FLEXERIL) 5 MG tablet As Needed.      diphenhydrAMINE (BENADRYL) 25 mg capsule Take 1 capsule by mouth. For CT's only  Take as prescribed      furosemide (LASIX) 40 MG tablet Take 1 tablet by mouth Daily. 40 tablet 0    GNP Gas Relief Extra Strength 125 MG chewable tablet As Needed.      hydroxychloroquine (PLAQUENIL) 200 MG tablet Take 1  "tablet by mouth 2 (Two) Times a Day.      insulin lispro (humaLOG) 100 UNIT/ML injection Insulin Pump      ipratropium (ATROVENT) 0.06 % nasal spray As Needed.      levothyroxine (SYNTHROID, LEVOTHROID) 150 MCG tablet Take 1 tablet by mouth Daily.      lisinopril (PRINIVIL,ZESTRIL) 20 MG tablet Take 1 tablet by mouth 2 (Two) Times a Day.      metOLazone (ZAROXOLYN) 2.5 MG tablet Take 1 tablet by mouth Daily. 15 tablet 0    ondansetron (ZOFRAN) 4 MG tablet As Needed.      promethazine (PHENERGAN) 25 MG tablet As Needed for Nausea.      sertraline (ZOLOFT) 100 MG tablet Take 1 tablet by mouth Daily.      spironolactone (ALDACTONE) 50 MG tablet Take 1 tablet by mouth Daily.      sulindac (CLINORIL) 200 MG tablet Take 1 tablet by mouth 2 (Two) Times a Day.      Tirzepatide (Mounjaro) 10 MG/0.5ML solution pen-injector pen Inject 0.5 mL under the skin into the appropriate area as directed 1 (One) Time Per Week.      vitamin D3 125 MCG (5000 UT) capsule capsule Take 2 capsules by mouth Daily.      gabapentin (NEURONTIN) 300 MG capsule Take 1 capsule by mouth 2 (Two) Times a Day.      predniSONE (DELTASONE) 50 MG tablet Take 1 tablet by mouth Every 6 (Six) Hours. Take 1 tablet 13 hours before CT scan, 1 tablet 7 hours before CT scan, and 1 Tablet 1 hour before CT scan. (Patient not taking: Reported on 3/28/2024) 3 tablet 0    vitamin B-6 (PYRIDOXINE) 100 MG tablet Take 1 tablet by mouth Daily.       No current facility-administered medications for this visit.     Allergies:  Ct contrast    Objective      Vital Signs  Visit Vitals  /66 (BP Location: Right arm, Patient Position: Sitting, Cuff Size: Large Adult)   Pulse 97   Ht 154.9 cm (61\")   Wt 111 kg (245 lb)   LMP 03/17/2020 (Approximate)   SpO2 97%   BMI 46.29 kg/m²         Physical Exam  Constitutional:       Appearance: She is obese.   HENT:      Head: Normocephalic and atraumatic.   Eyes:      Pupils: Pupils are equal, round, and reactive to light. "   Cardiovascular:      Rate and Rhythm: Normal rate and regular rhythm.      Heart sounds: No murmur heard.  Pulmonary:      Effort: Pulmonary effort is normal.      Breath sounds: Normal breath sounds. No wheezing, rhonchi or rales.   Abdominal:      General: There is no distension.      Palpations: Abdomen is soft.      Tenderness: There is no abdominal tenderness.   Musculoskeletal:         General: Normal range of motion.      Cervical back: Normal range of motion and neck supple.      Right lower leg: No edema.      Left lower leg: No edema.   Skin:     General: Skin is warm and dry.   Neurological:      General: No focal deficit present.      Mental Status: She is alert and oriented to person, place, and time.   Psychiatric:         Mood and Affect: Mood normal.         Behavior: Behavior normal.       Results Review:   CT Chest With Contrast Diagnostic    Result Date: 3/28/2024  Narrative: EXAM: CT CHEST W CONTRAST DIAGNOSTIC-  DATE: 3/28/2024 10:22 AM  HISTORY: Lung nodules, multiple; R91.8-Other nonspecific abnormal finding of lung field   COMPARISON: 3/8/2023.  DOSE LENGTH PRODUCT: 564.3 mGy.cm mGy cm. Automatic exposure control was utilized to make radiation dose as low as reasonably achievable.  TECHNIQUE: Enhanced CT images of the chest obtained with multiplanar reformats.  FINDINGS:  AIRWAYS/PULMONARY PARENCHYMA: The central airways are midline and patent. No pleural effusion or pneumothorax.  No new suspicious pulmonary finding. Similar small scarring or atelectasis at the lingula.  Similar 3 mm pleural-based RIGHT lower lobe pulmonary nodule on axial series 3, image 104 compared to 3/8/2023 and 3/9/2022. Similar small scattered pulmonary nodules.  VASCULATURE: Two-vessel aortic arch is normal in course and caliber with calcified and noncalcified atherosclerosis. Phase of contrast limits evaluation of the pulmonary artery. Normal pulmonary artery caliber.  CARDIAC: Changes of prior CABG. Normal heart  size. Scattered coronary artery calcifications. No pericardial effusion.   MEDIASTINUM: Esophagus has normal course, caliber and wall thickness. There is no mediastinal or hilar lymphadenopathy by CT size criteria.  EXTRATHORACIC: The visualized portions of the thyroid gland are unremarkable. No thoracic inlet or axillary adenopathy. Small subxiphoid fat-containing hernia.  INCLUDED UPPER ABDOMEN: Visualized portion of the gallbladder, liver, pancreas, spleen, adrenal glands and upper kidneys appear within normal limits. Partially visualized retroaortic LEFT renal vein.  BONES: There are degenerative changes of the visualized spine. No acute or suspicious bony finding. Sternotomy.       Impression: 1. No new suspicious pulmonary finding. Similar small scattered pulmonary nodules compared to prior. 2. Calcified aortic and coronary artery atherosclerosis. Changes of CABG.  This report was signed and finalized on 3/28/2024 2:12 PM by Dr Jolly Chong MD.        Personal interpretation of radiographic imaging:  Stable right lower lobe pulmonary nodule measuring 5-6 mm in size.  Stable tiny scattered pulmonary nodules.  Decreased mediastinal adenopathy.    Assessment & Plan       Diagnoses and all orders for this visit:    1. Lung nodules (Primary)  -     CT chest w contrast; Future    2. Former smoker    3. Mediastinal lymphadenopathy      Ms. Cox is overall doing well today.  CT imaging today shows stable scattered lung nodules and improving mediastinal lymphadenopathy.  She denies new or worsening shortness of breath.  She denies hemoptysis, unintentional weight loss, persistent cough, or chest pain.  She reports that she quit smoking approximately 3 years ago. We discussed options for care including continued surveillance verses efforts towards diagnosis and treatment.  We discussed options for diagnosis including bronchoscopy, CT-guided needle biopsy, or surgical biopsy with either cervical mediastinoscopy or  Thoracoscopy with resection.  Surveillance is probably the best option at this time.  Will have her return in 1 year with Dr. Thomas with CT of the chest with contrast.    We discussed signs and symptoms suggesting malignancy including but not limited to hoarseness of voice, hemoptysis, persistent cough, unintended weight loss, chest pain, or persistent pneumonia.  The potential of a delayed diagnosis and adverse results were discussed.  Collaboratively a decision has been made that reflects the patient's risk factors, the data available and patient's considerations.  All questions have been answered to the best of my ability and she is agreeable to the aforementioned plan.    Krys Cox  reports that she quit smoking about 3 years ago. Her smoking use included cigarettes. She started smoking about 38 years ago. She has a 35.2 pack-year smoking history. She has been exposed to tobacco smoke. She has never used smokeless tobacco. I have educated her on the risk of diseases from using tobacco products such as cancer, COPD, and heart disease    Advance Care Planning   ACP discussion was declined by the patient. Patient does not have an advance directive, declines further assistance.        Karina Bar, APRN

## 2024-06-20 ENCOUNTER — TRANSCRIBE ORDERS (OUTPATIENT)
Dept: ADMINISTRATIVE | Facility: HOSPITAL | Age: 54
End: 2024-06-20
Payer: COMMERCIAL

## 2024-06-20 DIAGNOSIS — Z12.31 ENCOUNTER FOR SCREENING MAMMOGRAM FOR MALIGNANT NEOPLASM OF BREAST: Primary | ICD-10-CM

## 2024-06-25 LAB
NCCN CRITERIA FLAG: NORMAL
TYRER CUZICK SCORE: 9

## 2024-06-28 ENCOUNTER — HOSPITAL ENCOUNTER (OUTPATIENT)
Dept: MAMMOGRAPHY | Facility: HOSPITAL | Age: 54
Discharge: HOME | End: 2024-06-28
Admitting: NURSE PRACTITIONER
Payer: COMMERCIAL

## 2024-06-28 DIAGNOSIS — Z12.31 ENCOUNTER FOR SCREENING MAMMOGRAM FOR MALIGNANT NEOPLASM OF BREAST: ICD-10-CM

## 2024-06-28 PROCEDURE — 77063 BREAST TOMOSYNTHESIS BI: CPT

## 2024-06-28 PROCEDURE — 77067 SCR MAMMO BI INCL CAD: CPT

## 2024-08-28 ENCOUNTER — OFFICE VISIT (OUTPATIENT)
Dept: OBSTETRICS AND GYNECOLOGY | Age: 54
End: 2024-08-28
Payer: COMMERCIAL

## 2024-08-28 VITALS
WEIGHT: 239 LBS | DIASTOLIC BLOOD PRESSURE: 82 MMHG | BODY MASS INDEX: 45.12 KG/M2 | SYSTOLIC BLOOD PRESSURE: 142 MMHG | HEIGHT: 61 IN

## 2024-08-28 DIAGNOSIS — N39.3 SUI (STRESS URINARY INCONTINENCE, FEMALE): ICD-10-CM

## 2024-08-28 DIAGNOSIS — E66.01 MORBID OBESITY WITH BMI OF 45.0-49.9, ADULT: ICD-10-CM

## 2024-08-28 DIAGNOSIS — Z01.419 WELL WOMAN EXAM WITH ROUTINE GYNECOLOGICAL EXAM: Primary | ICD-10-CM

## 2024-08-28 DIAGNOSIS — N89.8 VAGINAL DRYNESS: ICD-10-CM

## 2024-08-28 PROCEDURE — 87591 N.GONORRHOEAE DNA AMP PROB: CPT | Performed by: NURSE PRACTITIONER

## 2024-08-28 PROCEDURE — G0123 SCREEN CERV/VAG THIN LAYER: HCPCS | Performed by: NURSE PRACTITIONER

## 2024-08-28 PROCEDURE — 87661 TRICHOMONAS VAGINALIS AMPLIF: CPT | Performed by: NURSE PRACTITIONER

## 2024-08-28 PROCEDURE — 87491 CHLMYD TRACH DNA AMP PROBE: CPT | Performed by: NURSE PRACTITIONER

## 2024-08-28 PROCEDURE — 87624 HPV HI-RISK TYP POOLED RSLT: CPT | Performed by: NURSE PRACTITIONER

## 2024-08-28 RX ORDER — TIRZEPATIDE 12.5 MG/.5ML
INJECTION, SOLUTION SUBCUTANEOUS
COMMUNITY
Start: 2024-07-23

## 2024-08-28 NOTE — PROGRESS NOTES
"Chief Complaint   Patient presents with    Gynecologic Exam     New patient here for annual, last pap \"25 years ago\", mammogram 6/28/24, colonoscopy 10/14/22, patient denies any problems or concerns.         Subjective     Krys Cox is a 54 y.o. female    History of Present Illness  Pt comes in today for annual wellness exam. Denies any problems.     /82 (BP Location: Left arm, Patient Position: Sitting, Cuff Size: Adult)   Ht 154.9 cm (61\")   Wt 108 kg (239 lb)   LMP 03/17/2020 (Approximate)   BMI 45.16 kg/m²     Outpatient Encounter Medications as of 8/28/2024   Medication Sig Dispense Refill    Accu-Chek Guide test strip       aspirin 81 MG chewable tablet Chew 1 tablet Daily.      atorvastatin (LIPITOR) 80 MG tablet Take 1 tablet by mouth Every Night.      busPIRone (BUSPAR) 5 MG tablet Take 1 tablet by mouth 2 (Two) Times a Day.      carvedilol (Coreg) 3.125 MG tablet Take 1 tablet by mouth 2 (Two) Times a Day With Meals. 30 tablet 0    cetirizine (zyrTEC) 10 MG tablet Take 1 tablet by mouth Daily.      cyclobenzaprine (FLEXERIL) 5 MG tablet As Needed.      diphenhydrAMINE (BENADRYL) 25 mg capsule Take 1 capsule by mouth. For CT's only  Take as prescribed      furosemide (LASIX) 40 MG tablet Take 1 tablet by mouth Daily. 40 tablet 0    gabapentin (NEURONTIN) 300 MG capsule Take 1 capsule by mouth 2 (Two) Times a Day.      GNP Gas Relief Extra Strength 125 MG chewable tablet As Needed.      hydroxychloroquine (PLAQUENIL) 200 MG tablet Take 1 tablet by mouth 2 (Two) Times a Day.      insulin lispro (humaLOG) 100 UNIT/ML injection Insulin Pump      ipratropium (ATROVENT) 0.06 % nasal spray As Needed.      levothyroxine (SYNTHROID, LEVOTHROID) 150 MCG tablet Take 1 tablet by mouth Daily.      lisinopril (PRINIVIL,ZESTRIL) 20 MG tablet Take 1 tablet by mouth 2 (Two) Times a Day.      metOLazone (ZAROXOLYN) 2.5 MG tablet Take 1 tablet by mouth Daily. 15 tablet 0    Mounjaro 12.5 MG/0.5ML solution " pen-injector pen INJECT 12.5mg SUBCUTANEOUSLY WEEKLY ON THE same DAY of each WEEK      ondansetron (ZOFRAN) 4 MG tablet As Needed.      promethazine (PHENERGAN) 25 MG tablet As Needed for Nausea.      sertraline (ZOLOFT) 100 MG tablet Take 1 tablet by mouth Daily.      spironolactone (ALDACTONE) 50 MG tablet Take 1 tablet by mouth Daily.      sulindac (CLINORIL) 200 MG tablet Take 1 tablet by mouth 2 (Two) Times a Day.      [DISCONTINUED] predniSONE (DELTASONE) 50 MG tablet Take 1 tablet by mouth Every 6 (Six) Hours. Take 1 tablet 13 hours before CT scan, 1 tablet 7 hours before CT scan, and 1 Tablet 1 hour before CT scan. (Patient not taking: Reported on 3/28/2024) 3 tablet 0    [DISCONTINUED] Tirzepatide (Mounjaro) 10 MG/0.5ML solution pen-injector pen Inject 0.5 mL under the skin into the appropriate area as directed 1 (One) Time Per Week.      [DISCONTINUED] vitamin B-6 (PYRIDOXINE) 100 MG tablet Take 1 tablet by mouth Daily.      [DISCONTINUED] vitamin D3 125 MCG (5000 UT) capsule capsule Take 2 capsules by mouth Daily.       No facility-administered encounter medications on file as of 2024.       Past Medical History  Past Medical History:   Diagnosis Date    Arthritis     CHF (congestive heart failure)     Coronary artery disease     Diabetes mellitus     Disease of thyroid gland     Elevated cholesterol     Hypertension         Surgical History  Past Surgical History:   Procedure Laterality Date    CARDIAC CATHETERIZATION      CARPAL TUNNEL RELEASE       SECTION      she has had 4    COLONOSCOPY N/A 10/14/2022    Procedure: COLONOSCOPY WITH ANESTHESIA;  Surgeon: Tristen Neumann DO;  Location: [facility] ENDOSCOPY;  Service: Gastroenterology;  Laterality: N/A;  pre screen  post normal  Karolina Toney    CORONARY ARTERY BYPASS GRAFT N/A 2021    Procedure: CORONARY ARTERY BYPASS GRAFT X 3 WITH LEFT INTERNAL MAMMARY ARTERY, BILATERAL LOWER EXTREMITY OPEN VEIN HARVEST, AND PERFUSION; APPLICATION  OF PREVENA INCISIONAL WOUND VAC X 3; TRANSESOPHAGEAL ECHOCARDIOGRAM;  Surgeon: Vicente Thomas MD;  Location: [facility] OR;  Service: Cardiothoracic;  Laterality: N/A;    DILATATION AND CURETTAGE      TUBAL ABDOMINAL LIGATION         Family History  Family History   Problem Relation Age of Onset    Hypertension Mother     Clotting disorder Mother     Hyperlipidemia Sister     Clotting disorder Maternal Grandmother     Breast cancer Maternal Aunt     Colon polyps Maternal Aunt     Breast cancer Maternal Cousin     Colon cancer Neg Hx     Esophageal cancer Neg Hx     Uterine cancer Neg Hx     Ovarian cancer Neg Hx     Melanoma Neg Hx        The following portions of the patient's history were reviewed and updated as appropriate: allergies, current medications, past family history, past medical history, past social history, past surgical history, and problem list.    Review of Systems   Constitutional:  Negative for activity change and unexpected weight loss.   Cardiovascular:  Negative for chest pain.   Gastrointestinal:  Negative for blood in stool, constipation and diarrhea.   Endocrine: Negative for cold intolerance and heat intolerance.   Genitourinary:  Negative for dyspareunia, pelvic pain and vaginal discharge.   Musculoskeletal:  Negative for arthralgias, back pain, neck pain and neck stiffness.   Skin:  Negative for rash.   Neurological:  Negative for dizziness and headache.   Psychiatric/Behavioral:  Negative for sleep disturbance. The patient is not nervous/anxious.        Objective   Physical Exam  Vitals and nursing note reviewed. Exam conducted with a chaperone present.   Constitutional:       General: She is not in acute distress.     Appearance: She is well-developed. She is obese. She is not diaphoretic.   HENT:      Head: Normocephalic.      Right Ear: External ear normal.      Left Ear: External ear normal.      Nose: Nose normal.   Eyes:      General: No scleral icterus.        Right eye: No  discharge.         Left eye: No discharge.      Conjunctiva/sclera: Conjunctivae normal.      Pupils: Pupils are equal, round, and reactive to light.   Neck:      Thyroid: No thyromegaly.      Vascular: No carotid bruit.      Trachea: No tracheal deviation.   Cardiovascular:      Rate and Rhythm: Normal rate and regular rhythm.      Heart sounds: Normal heart sounds. No murmur heard.  Pulmonary:      Effort: Pulmonary effort is normal. No respiratory distress.      Breath sounds: Normal breath sounds. No wheezing.   Chest:   Breasts:     Breasts are symmetrical.      Right: Normal. No swelling, bleeding, inverted nipple, mass, nipple discharge, skin change or tenderness.      Left: Normal. No swelling, bleeding, inverted nipple, mass, nipple discharge, skin change or tenderness.   Abdominal:      General: There is no distension.      Palpations: Abdomen is soft. There is no mass.      Tenderness: There is no abdominal tenderness. There is no right CVA tenderness, left CVA tenderness or guarding.      Hernia: No hernia is present. There is no hernia in the left inguinal area or right inguinal area.   Genitourinary:     General: Normal vulva.      Exam position: Lithotomy position.      Labia:         Right: No rash, tenderness, lesion or injury.         Left: No rash, tenderness, lesion or injury.       Vagina: Normal. No signs of injury and foreign body. No vaginal discharge, erythema, tenderness or bleeding.      Cervix: Normal.      Uterus: Normal. Not enlarged, not fixed and not tender.       Adnexa: Right adnexa normal and left adnexa normal.        Right: No mass, tenderness or fullness.          Left: No mass, tenderness or fullness.        Rectum: Normal. No mass.      Comments:   BSU normal  Urethral meatus  Normal  Perineum  Normal    Vaginal dryness noted on exam.   Musculoskeletal:         General: No tenderness. Normal range of motion.      Cervical back: Normal range of motion and neck supple.    Lymphadenopathy:      Head:      Right side of head: No submental, submandibular, tonsillar, preauricular, posterior auricular or occipital adenopathy.      Left side of head: No submental, submandibular, tonsillar, preauricular, posterior auricular or occipital adenopathy.      Cervical: No cervical adenopathy.      Right cervical: No superficial, deep or posterior cervical adenopathy.     Left cervical: No superficial, deep or posterior cervical adenopathy.      Upper Body:      Right upper body: No supraclavicular, axillary or pectoral adenopathy.      Left upper body: No supraclavicular, axillary or pectoral adenopathy.      Lower Body: No right inguinal adenopathy. No left inguinal adenopathy.   Skin:     General: Skin is warm and dry.      Findings: No bruising, erythema or rash.   Neurological:      Mental Status: She is alert and oriented to person, place, and time.      Coordination: Coordination normal.   Psychiatric:         Mood and Affect: Mood normal.         Behavior: Behavior normal.         Thought Content: Thought content normal.         Judgment: Judgment normal.         Assessment & Plan   Diagnoses and all orders for this visit:    1. Well woman exam with routine gynecological exam (Primary)  -     Liquid-based Pap Smear, Screening    2. Vaginal dryness    3. KAYLIN (stress urinary incontinence, female)    4. Morbid obesity with BMI of 45.0-49.9, adult         Normal GYN exam. Encouraged SBE, pt is aware how to do self breast exam and the importance of same. Discussed weight management and importance of maintaining a healthy weight. Discussed Vitamin D intake and the importance of adequate vitamin D for both bone health and a healthy immune system.  Discussed daily exercise and the importance of same, in regards to a healthy heart as well as helping to maintain her weight and improving her mental health.  Colonoscopy is up to date. Mammogram is up to date. Bone density  pt wishes to schedule with  mammo next year . Pap smear is done per ASCCP guidelines. HPV is done. Lab work up is up to date through PCP.    Pt denies history of abnormal pap.    Pt not sexually active. Vaginal dryness noted on exam. Discussed using coconut oil.    Pt does admit to some AKYLIN. Declines therapy at this time.       Azalea Perkins, JUDIE  8/28/2024    Return in about 1 year (around 8/28/2025) for Annual physical.

## 2024-08-29 LAB
GEN CATEG CVX/VAG CYTO-IMP: NORMAL
HPV I/H RISK 4 DNA CVX QL PROBE+SIG AMP: NOT DETECTED
LAB AP CASE REPORT: NORMAL
LAB AP GYN ADDITIONAL INFORMATION: NORMAL
LAB AP GYN OTHER FINDINGS: NORMAL
Lab: NORMAL
PATH INTERP SPEC-IMP: NORMAL
STAT OF ADQ CVX/VAG CYTO-IMP: NORMAL

## 2024-08-30 LAB
C TRACH RRNA CVX QL NAA+PROBE: NOT DETECTED
N GONORRHOEA RRNA SPEC QL NAA+PROBE: NOT DETECTED
TRICHOMONAS VAGINALIS PCR: NOT DETECTED

## 2024-11-11 ENCOUNTER — TELEPHONE (OUTPATIENT)
Dept: CARDIAC SURGERY | Facility: CLINIC | Age: 54
End: 2024-11-11

## 2024-11-11 NOTE — TELEPHONE ENCOUNTER
Caller: Krys Cox    Relationship: Self    Best call back number: [phone]    What is the best time to reach you: ANY    Who are you requesting to speak with (clinical staff, provider,  specific staff member): CLINICAL    Do you know the name of the person who called: PT    What was the call regarding: PT IS NEEDING TO KNOW IF SHE WAS CLOSED UP WITH STERNAL WIRE AT HER LAST SURGERY, DUE TO HER HAVING SOME UPCOMING TESTING THIS IS A QUESTION THEY ASK ON THE PAPERWORK. PLEASE GIVE HER A CALL BACK ASAP. THANK YOU       Is it okay if the provider responds through [facility]: NO

## 2024-11-11 NOTE — TELEPHONE ENCOUNTER
" From Dr. Thomas op note from 3/19/2021: \"The sternum was reapproximated with stainless sterile wires placed in an interrupted fashion.\"  Please relay"

## 2025-03-03 ENCOUNTER — TELEPHONE (OUTPATIENT)
Dept: CARDIAC SURGERY | Facility: CLINIC | Age: 55
End: 2025-03-03
Payer: COMMERCIAL

## 2025-03-03 DIAGNOSIS — R91.8 LUNG NODULES: Primary | ICD-10-CM

## 2025-03-03 RX ORDER — PREDNISONE 50 MG/1
50 TABLET ORAL SEE ADMIN INSTRUCTIONS
Qty: 3 TABLET | Refills: 0 | Status: SHIPPED | OUTPATIENT
Start: 2025-03-03

## 2025-03-03 NOTE — TELEPHONE ENCOUNTER
Pt will be having CT on 04/02/2025 and will need premed. New order sent to JUDIE Maciel for signature as old order was from JUDIE De Anda.

## 2025-03-25 ENCOUNTER — TELEPHONE (OUTPATIENT)
Dept: CARDIAC SURGERY | Facility: CLINIC | Age: 55
End: 2025-03-25

## 2025-03-25 NOTE — TELEPHONE ENCOUNTER
Called pt and office appt on 4-2-25 cx'd per verbal instruction from Dr Thomas.  Pt will keep her CT appt at 3pm that day and Dr Thomas will call pt with results when available.  Pt voiced understanding to all/kahm

## 2025-04-02 ENCOUNTER — HOSPITAL ENCOUNTER (OUTPATIENT)
Dept: CT IMAGING | Facility: HOSPITAL | Age: 55
Discharge: HOME | End: 2025-04-02
Payer: COMMERCIAL

## 2025-04-07 NOTE — TELEPHONE ENCOUNTER
Called patient, she indicates that she called on April 2 and canceled her CT scan.  From review of record it does not appear her scan was canceled.  It sounds like she spoke with someone at the answering service or the hub but this has not been documented.  Please reschedule her CT scan and office visit, she would rather see Dr. Thomas in person to go over results.  She is expecting a call back with the above information.

## 2025-04-07 NOTE — TELEPHONE ENCOUNTER
Called pt and appt resched with Dr Thomas for 5-28-25.  Pt aware office will call her with new CT appt to correspond with this appt.  She is aware it may be a few weeks before this is sched./frieda

## 2025-06-16 ENCOUNTER — TELEPHONE (OUTPATIENT)
Dept: CARDIAC SURGERY | Facility: CLINIC | Age: 55
End: 2025-06-16
Payer: COMMERCIAL

## 2025-06-16 RX ORDER — DIPHENHYDRAMINE HCL 25 MG
25 TABLET ORAL SEE ADMIN INSTRUCTIONS
Qty: 2 TABLET | Refills: 0 | Status: SHIPPED | OUTPATIENT
Start: 2025-06-16

## 2025-06-16 RX ORDER — PREDNISONE 50 MG/1
50 TABLET ORAL SEE ADMIN INSTRUCTIONS
Qty: 3 TABLET | Refills: 0 | Status: SHIPPED | OUTPATIENT
Start: 2025-06-16

## 2025-06-25 ENCOUNTER — OFFICE VISIT (OUTPATIENT)
Dept: CARDIAC SURGERY | Facility: CLINIC | Age: 55
End: 2025-06-25
Payer: COMMERCIAL

## 2025-06-25 ENCOUNTER — HOSPITAL ENCOUNTER (OUTPATIENT)
Dept: CT IMAGING | Facility: HOSPITAL | Age: 55
Discharge: HOME | End: 2025-06-25
Admitting: NURSE PRACTITIONER
Payer: COMMERCIAL

## 2025-06-25 VITALS
BODY MASS INDEX: 48.9 KG/M2 | WEIGHT: 259 LBS | HEART RATE: 100 BPM | OXYGEN SATURATION: 96 % | HEIGHT: 61 IN | DIASTOLIC BLOOD PRESSURE: 90 MMHG | SYSTOLIC BLOOD PRESSURE: 141 MMHG

## 2025-06-25 DIAGNOSIS — R59.0 MEDIASTINAL LYMPHADENOPATHY: ICD-10-CM

## 2025-06-25 DIAGNOSIS — R91.8 LUNG NODULES: Primary | ICD-10-CM

## 2025-06-25 DIAGNOSIS — R91.8 LUNG NODULES: ICD-10-CM

## 2025-06-25 LAB — CREAT BLDA-MCNC: 0.9 MG/DL (ref 0.6–1.3)

## 2025-06-25 PROCEDURE — 3077F SYST BP >= 140 MM HG: CPT | Performed by: THORACIC SURGERY (CARDIOTHORACIC VASCULAR SURGERY)

## 2025-06-25 PROCEDURE — 71260 CT THORAX DX C+: CPT

## 2025-06-25 PROCEDURE — 82565 ASSAY OF CREATININE: CPT

## 2025-06-25 PROCEDURE — 99213 OFFICE O/P EST LOW 20 MIN: CPT | Performed by: THORACIC SURGERY (CARDIOTHORACIC VASCULAR SURGERY)

## 2025-06-25 PROCEDURE — 25510000001 IOPAMIDOL 61 % SOLUTION: Performed by: NURSE PRACTITIONER

## 2025-06-25 PROCEDURE — 3080F DIAST BP >= 90 MM HG: CPT | Performed by: THORACIC SURGERY (CARDIOTHORACIC VASCULAR SURGERY)

## 2025-06-25 RX ORDER — MELOXICAM 15 MG/1
15 TABLET ORAL DAILY PRN
COMMUNITY
Start: 2025-06-12

## 2025-06-25 RX ORDER — IOPAMIDOL 612 MG/ML
100 INJECTION, SOLUTION INTRAVASCULAR
Status: COMPLETED | OUTPATIENT
Start: 2025-06-25 | End: 2025-06-25

## 2025-06-25 RX ORDER — TRAMADOL HYDROCHLORIDE 50 MG/1
50 TABLET ORAL 2 TIMES DAILY PRN
COMMUNITY

## 2025-06-25 RX ORDER — DULAGLUTIDE 1.5 MG/.5ML
INJECTION, SOLUTION SUBCUTANEOUS
COMMUNITY
Start: 2025-06-20

## 2025-06-25 RX ADMIN — IOPAMIDOL 100 ML: 612 INJECTION, SOLUTION INTRAVENOUS at 13:51

## 2025-07-08 ENCOUNTER — OFFICE VISIT (OUTPATIENT)
Dept: CARDIOLOGY | Facility: CLINIC | Age: 55
End: 2025-07-08
Payer: COMMERCIAL

## 2025-07-08 VITALS
BODY MASS INDEX: 48.33 KG/M2 | SYSTOLIC BLOOD PRESSURE: 101 MMHG | HEIGHT: 61 IN | DIASTOLIC BLOOD PRESSURE: 61 MMHG | WEIGHT: 256 LBS | HEART RATE: 71 BPM

## 2025-07-08 DIAGNOSIS — Z79.4 TYPE 2 DIABETES MELLITUS WITH COMPLICATION, WITH LONG-TERM CURRENT USE OF INSULIN: ICD-10-CM

## 2025-07-08 DIAGNOSIS — Z95.1 S/P CABG (CORONARY ARTERY BYPASS GRAFT): ICD-10-CM

## 2025-07-08 DIAGNOSIS — E66.813 CLASS 3 SEVERE OBESITY DUE TO EXCESS CALORIES WITH SERIOUS COMORBIDITY AND BODY MASS INDEX (BMI) OF 45.0 TO 49.9 IN ADULT: Chronic | ICD-10-CM

## 2025-07-08 DIAGNOSIS — E11.8 TYPE 2 DIABETES MELLITUS WITH COMPLICATION, WITH LONG-TERM CURRENT USE OF INSULIN: ICD-10-CM

## 2025-07-08 DIAGNOSIS — I25.10 CORONARY ARTERY DISEASE INVOLVING NATIVE CORONARY ARTERY OF NATIVE HEART WITHOUT ANGINA PECTORIS: ICD-10-CM

## 2025-07-08 DIAGNOSIS — R42 ORTHOSTATIC DIZZINESS: Primary | ICD-10-CM

## 2025-07-08 DIAGNOSIS — E78.2 MIXED HYPERLIPIDEMIA: ICD-10-CM

## 2025-07-08 DIAGNOSIS — I10 PRIMARY HYPERTENSION: ICD-10-CM

## 2025-07-08 PROCEDURE — 3078F DIAST BP <80 MM HG: CPT | Performed by: NURSE PRACTITIONER

## 2025-07-08 PROCEDURE — 1159F MED LIST DOCD IN RCRD: CPT | Performed by: NURSE PRACTITIONER

## 2025-07-08 PROCEDURE — 99214 OFFICE O/P EST MOD 30 MIN: CPT | Performed by: NURSE PRACTITIONER

## 2025-07-08 PROCEDURE — 1160F RVW MEDS BY RX/DR IN RCRD: CPT | Performed by: NURSE PRACTITIONER

## 2025-07-08 PROCEDURE — 3074F SYST BP LT 130 MM HG: CPT | Performed by: NURSE PRACTITIONER

## 2025-07-08 NOTE — PROGRESS NOTES
Subjective:     Encounter Date:07/08/2025      Patient ID: Krys Cox is a 55 y.o. female with known coronary artery disease with prior CABG, hypertension, hyperlipidemia, insulin requiring diabetes mellitus, and obesity who presents to the office today for routine follow up.    Chief Complaint: Routine CAD follow Up   Coronary Artery Disease  Presents for follow-up visit. Symptoms include dizziness. Pertinent negatives include no chest pain, chest pressure, chest tightness, leg swelling, palpitations or shortness of breath. The symptoms have been stable. Compliance with diet is good. Compliance with exercise is good. Compliance with medications is good.     Ms. Cox presents today for follow up. She has been following in our office due to a history of coronary artery disease.  She was last seen in our office in Jan 2024.  She follows with her primary care office and has feeling well from a cardiac standpoint but has been dealing with dizziness and syncope. She reports several episodes. She is uncertain the cause. She describes dizziness that is worse with position changes.She is compliant with her home medications.  She denies any anginal symptoms.       The following portions of the patient's history were reviewed and updated as appropriate: allergies, current medications, past family history, past medical history, past social history, past surgical history, and problem list.    Allergies   Allergen Reactions    Ct Contrast Itching       Current Outpatient Medications:     Accu-Chek Guide test strip, , Disp: , Rfl:     aspirin 81 MG chewable tablet, Chew 1 tablet Daily., Disp: , Rfl:     atorvastatin (LIPITOR) 80 MG tablet, Take 1 tablet by mouth Every Night., Disp: , Rfl:     busPIRone (BUSPAR) 5 MG tablet, Take 2 tablets by mouth 2 (Two) Times a Day., Disp: , Rfl:     carvedilol (Coreg) 3.125 MG tablet, Take 1 tablet by mouth 2 (Two) Times a Day With Meals., Disp: 30 tablet, Rfl: 0    cetirizine  (zyrTEC) 10 MG tablet, Take 1 tablet by mouth Daily., Disp: , Rfl:     cyclobenzaprine (FLEXERIL) 5 MG tablet, As Needed., Disp: , Rfl:     diphenhydrAMINE (Benadryl Allergy) 25 MG tablet, Take 1 tablet by mouth See Admin Instructions., Disp: 2 tablet, Rfl: 0    furosemide (LASIX) 40 MG tablet, Take 1 tablet by mouth Daily., Disp: 40 tablet, Rfl: 0    gabapentin (NEURONTIN) 300 MG capsule, Take 1 capsule by mouth 2 (Two) Times a Day., Disp: , Rfl:     GNP Gas Relief Extra Strength 125 MG chewable tablet, As Needed., Disp: , Rfl:     hydroxychloroquine (PLAQUENIL) 200 MG tablet, Take 1 tablet by mouth 2 (Two) Times a Day., Disp: , Rfl:     insulin lispro (humaLOG) 100 UNIT/ML injection, Insulin Pump, Disp: , Rfl:     ipratropium (ATROVENT) 0.06 % nasal spray, As Needed., Disp: , Rfl:     levothyroxine (SYNTHROID, LEVOTHROID) 150 MCG tablet, Take 1 tablet by mouth Daily., Disp: , Rfl:     lisinopril (PRINIVIL,ZESTRIL) 30 MG tablet, Take 1 tablet by mouth Daily., Disp: , Rfl:     meloxicam (MOBIC) 15 MG tablet, Take 1 tablet by mouth Daily As Needed., Disp: , Rfl:     metOLazone (ZAROXOLYN) 2.5 MG tablet, Take 1 tablet by mouth Daily., Disp: 15 tablet, Rfl: 0    ondansetron (ZOFRAN) 4 MG tablet, As Needed., Disp: , Rfl:     predniSONE (DELTASONE) 50 MG tablet, Take 1 tablet by mouth See Admin Instructions., Disp: 3 tablet, Rfl: 0    promethazine (PHENERGAN) 25 MG tablet, As Needed for Nausea., Disp: , Rfl:     sertraline (ZOLOFT) 100 MG tablet, Take 1 tablet by mouth Daily., Disp: , Rfl:     spironolactone (ALDACTONE) 50 MG tablet, Take 1 tablet by mouth Daily., Disp: , Rfl:     traMADol (ULTRAM) 50 MG tablet, Take 1 tablet by mouth 2 (Two) Times a Day As Needed., Disp: , Rfl:     Trulicity 1.5 MG/0.5ML solution auto-injector, INJECT 1.5mg SUBCUTANEOUSLY ONCE A WEEK, Disp: , Rfl:       Review of Systems   Constitutional: Negative for diaphoresis, fever and malaise/fatigue.   Eyes:  Negative for visual disturbance.    Cardiovascular:  Positive for near-syncope and syncope. Negative for chest pain, dyspnea on exertion, irregular heartbeat, leg swelling, orthopnea, palpitations and paroxysmal nocturnal dyspnea.   Respiratory:  Negative for chest tightness, shortness of breath and wheezing.    Hematologic/Lymphatic: Negative for bleeding problem.   Musculoskeletal:  Positive for arthritis and joint pain.   Gastrointestinal:  Negative for bloating, abdominal pain and nausea.   Neurological:  Positive for dizziness and light-headedness. Negative for headaches, loss of balance and weakness.         ECG 12 Lead    Date/Time: 7/9/2025 9:30 PM  Performed by: Waleska Leal APRN    Authorized by: Waleska Leal APRN  Comparison: compared with previous ECG from 1/24/2024  Similar to previous ECG  Rhythm: sinus rhythm  Rate: normal  BPM: 71  Conduction: left anterior fascicular block  Q waves: V1, V2 and aVR    QRS axis: left  Other findings: poor R wave progression    Clinical impression: abnormal EKG             Objective:     Vitals reviewed.   Constitutional:       General: Awake. Not in acute distress.     Appearance: Normal appearance. Well-developed, well-groomed and not in distress. Obese. Chronically ill-appearing. Not ill-appearing or toxic-appearing.   HENT:      Head: Normocephalic and atraumatic.   Pulmonary:      Effort: Pulmonary effort is normal.      Breath sounds: No wheezing. No rhonchi. No rales.   Cardiovascular:      Normal rate. Regular rhythm.      Murmurs: There is no murmur.      No gallop.  No rub.   Edema:     Peripheral edema absent.   Musculoskeletal:      Cervical back: Normal range of motion and neck supple. Skin:     General: Skin is warm and dry.   Neurological:      Mental Status: Alert, oriented to person, place, and time and oriented to person, place and time.   Psychiatric:         Attention and Perception: Attention normal.         Mood and Affect: Mood normal.         Speech: Speech normal.        "  Behavior: Behavior normal. Behavior is cooperative.         Thought Content: Thought content normal.         Cognition and Memory: Cognition and memory normal.         /61   Pulse 71   Ht 154.9 cm (61\")   Wt 116 kg (256 lb)   LMP 03/17/2020 (Approximate)   BMI 48.37 kg/m²     Lab Review:           Assessment:          Diagnosis Plan   1. Orthostatic dizziness        2. Coronary artery disease involving native coronary artery of native heart without angina pectoris        3. S/P CABG (coronary artery bypass graft)        4. Primary hypertension        5. Mixed hyperlipidemia        6. Type 2 diabetes mellitus with complication, with long-term current use of insulin        7. Class 3 severe obesity due to excess calories with serious comorbidity and body mass index (BMI) of 45.0 to 49.9 in adult               Plan:       Orthostatic dizziness and hypotension: decrease lisinopril. She is currently taking lisinopril 20 mg BID. Although this has been used as BID dosing it is a long lasting medication. We will reduce her total dose to 30 mg daily and see if this helps her blood pressure control as well as reduces dizziness and symptoms. If not consider holter monitor.     CAD/CABG: stable. No anginal symptoms. Managed on aspirin, statin, and beta blocker therapy.      Hypertension: low low normal. Reduce dose as mentioned above    Hyperlipidemia: statin therapy with high intensity statin.     DM: type II on insulin managed per primary care.    Obesity: BMI 48.37 Cardiac diet, routine exercise and weight loss.    Reduce lisinopril to 30 mg daily from 20 mg BID. Monitor symptoms    Call back with symptoms in 3/4 weeks. Consider holter monitor at that time if still having palpitaitons, dizzinesss and syncope.        "

## 2025-07-09 PROBLEM — E11.65 POORLY CONTROLLED DIABETES MELLITUS: Status: RESOLVED | Noted: 2021-03-18 | Resolved: 2025-07-09

## 2025-07-09 PROBLEM — I21.4 NSTEMI (NON-ST ELEVATED MYOCARDIAL INFARCTION): Status: RESOLVED | Noted: 2021-03-18 | Resolved: 2025-07-09

## 2025-07-09 PROBLEM — I11.0 HYPERTENSIVE HEART DISEASE WITH CHRONIC DIASTOLIC CONGESTIVE HEART FAILURE: Status: RESOLVED | Noted: 2021-03-18 | Resolved: 2025-07-09

## 2025-07-09 PROBLEM — E66.813 CLASS 3 SEVERE OBESITY DUE TO EXCESS CALORIES WITH SERIOUS COMORBIDITY AND BODY MASS INDEX (BMI) OF 45.0 TO 49.9 IN ADULT: Chronic | Status: ACTIVE | Noted: 2021-03-18

## 2025-07-09 PROBLEM — I50.32 HYPERTENSIVE HEART DISEASE WITH CHRONIC DIASTOLIC CONGESTIVE HEART FAILURE: Status: RESOLVED | Noted: 2021-03-18 | Resolved: 2025-07-09

## 2025-07-09 PROBLEM — I25.10 CORONARY ARTERY DISEASE INVOLVING NATIVE CORONARY ARTERY OF NATIVE HEART WITHOUT ANGINA PECTORIS: Chronic | Status: ACTIVE | Noted: 2021-03-18

## 2025-07-09 PROCEDURE — 93000 ELECTROCARDIOGRAM COMPLETE: CPT | Performed by: NURSE PRACTITIONER

## 2025-07-14 NOTE — PROGRESS NOTES
Chief Complaint   Patient presents with    Lung Nodule     Patient is here for follow up w/CT    Mediastinal Lymphadenopathy     History of Present Illness      No new events.    She denies unintended weight loss, hoarseness of voice, chest pain, hemoptysis, history of pneumonia, or cough.      The following portions of the patient's history were reviewed and updated as appropriate: allergies, current medications, past family history, past medical history, past social history, past surgical history and problem list.       Objective   Physical Exam  Physical Exam  Physical Exam:    General: No acute distress, in good spirits  Cardiovascular: RRR, no murmur, rubs, or gallops.    Pulmonary: Clear to auscultation bilaterally, no wheezing, rubs, or rales.  Abdomen: Soft, nondistended, and nontender.  Extremities: Warm, moves all extremities.  Neurologic:  No focal deficits, CN II-XII intact grossly.          CT Chest With Contrast Diagnostic  Result Date: 6/26/2025  Narrative: EXAM: CT CHEST W CONTRAST DIAGNOSTIC-  HISTORY: Lung Nodules; R91.8-Other nonspecific abnormal finding of lung field   DOSE LENGTH PRODUCT: 472.19 mGy.cm mGy cm. Automated exposure control was also utilized to decrease patient radiation dose.  COMPARISON: 3/28/2024, 3/8/2023  TECHNIQUE: Serial helical tomographic images of the chest were acquired following the intravenous infusion of contrast. Multiplanar reformatted images were provided for review.  FINDINGS:  Airways/Lungs/Pleura: Stable small pulmonary nodules dating back to 3/8/2023, given 2+ year stability these are considered benign. No new or enlarging pulmonary nodule. No significant emphysematous changes. Minimal scarring within the left upper lobe/lingula. No consolidation or pleural effusion.  Lower Neck:Unremarkable.  Mediastinum/Hilum: Stable mildly prominent mediastinal lymph nodes, likely reactive. Granulomatous calcifications.  Heart/Vasculature: No pericardial effusion. Moderate  LAD calcifications. Normal in caliber with mild to moderate atherosclerosis.  Chest wall/Soft Tissues:Unremarkable.  Upper Abdomen: No acute or suspicious findings.  Osseous Structures: No acute or suspicious osseous finding.      Impression:  1. No suspicious pulmonary nodule. No new or enlarging pulmonary nodule.   This report was signed and finalized on 6/26/2025 8:06 AM by Claudy Romero.      Results      Diagnoses and all orders for this visit:    1. Lung nodules (Primary)  -     CT Chest Without Contrast; Future    2. Mediastinal lymphadenopathy          Assessment & Plan     Assessment & Plan      At this point she will continue with ongoing imaging for her lung nodule given patient preference to continue multidisciplinary evaluation given her tobacco use history.  We discussed signs and symptoms which would prompt an earlier assessment.  We discussed signs symptoms which would prompt earlier assessment in regards to her adenopathy although I believe at this time this is essentially benign.  All questions have been answered to the best of ability and she is agreeable to the forementioned plan.    She has been congratulated that she is quit smoking.     Many thanks for the opportunity to care for your patient.    I will continue to keep you apprised of provided care as it ensues.        Patient or patient representative verbalized consent for the use of Ambient Listening during the visit with  Vicente Thomas MD for chart documentation. 7/14/2025  07:50 CDT

## (undated) DEVICE — SUT SILK 2/0 FS BLK 18IN 685G

## (undated) DEVICE — KT ANTI FOG W/FLD AND SPNG

## (undated) DEVICE — SENSR O2 OXIMAX FNGR A/ 18IN NONSTR

## (undated) DEVICE — Device: Brand: DEFENDO AIR/WATER/SUCTION AND BIOPSY VALVE

## (undated) DEVICE — E-PACK PROCEDURE KIT: Brand: E-PACK

## (undated) DEVICE — TBG SMPL FLTR LINE NASL 02/C02 A/ BX/100

## (undated) DEVICE — PK OPN HEART 30

## (undated) DEVICE — SYS PUMP PREVENA125 INCSN MNGT PP/DRSNG 45ML 1P/U

## (undated) DEVICE — MASK,OXYGEN,MED CONC,ADLT,7' TUB, UC: Brand: PENDING

## (undated) DEVICE — GAUZE,SPONGE,4"X4",16PLY,XRAY,STRL,LF: Brand: MEDLINE

## (undated) DEVICE — YANKAUER,BULB TIP WITH VENT: Brand: ARGYLE

## (undated) DEVICE — CLTH CLENS READYCLEANSE PERI CARE PK/5

## (undated) DEVICE — PENCL ES MEGADINE EZ/CLEAN BUTN W/HOLSTR 10FT

## (undated) DEVICE — RESERVOIR,SUCTION,100CC,SILICONE: Brand: MEDLINE

## (undated) DEVICE — GLV SURG TRIUMPH MICRO PF LTX 8.5 STRL

## (undated) DEVICE — PK TURNOVER RM ADV

## (undated) DEVICE — SYS DISTNTION VEIN BONCHEK 300MMHG

## (undated) DEVICE — THE CHANNEL CLEANING BRUSH IS A NYLON FLEXI BRUSH ATTACHED TO A FLEXIBLE PLASTIC SHEATH DESIGNED TO SAFELY REMOVE DEBRIS FROM FLEXIBLE ENDOSCOPES.

## (undated) DEVICE — AORTIC PUNCHES ARE USED TO CREATE A UNIFORM OPENING IN BLOOD VESSELS DURING CARDIOVASCULAR SURGERY. THE VESSEL GRAFT IS INSERTED INTO THE CREATED OPENING AND SUTURED TO THE VESSEL WALL. AORTIC LANCETS ARE USED TO MAKE A SMALL UNIFORM CUT IN A BLOOD VESSEL TO FACILITATE INSERTION OF AN AORTIC PUNCH.  PUNCHES COME IN VARIOUS LENGTHS, DIAMETERS AND TIP CONFIGURATIONS.: Brand: CLEANCUT ROTATING AORTIC PUNCH

## (undated) DEVICE — GLV SURG BIOGEL LTX PF 6 1/2

## (undated) DEVICE — DRAIN,WOUND,ROUND,24FR,5/16",FULL-FLUTED: Brand: MEDLINE

## (undated) DEVICE — SYS VASOVIEW HEMOPRO ENDOSCOPIC HARVST VESL

## (undated) DEVICE — GLV SURG BIOGEL LTX PF 7 1/2

## (undated) DEVICE — STERNUM BLADE, OFFSET (32.0 X 0.8 X 6.3MM)

## (undated) DEVICE — PATIENT RETURN ELECTRODE, SINGLE-USE, CONTACT QUALITY MONITORING, ADULT, WITH 9FT CORD, FOR PATIENTS WEIGING OVER 33LBS. (15KG): Brand: MEGADYNE

## (undated) DEVICE — 1/2 FORCE SURGICAL SPRING CLIP: Brand: STEALTH® SPRING CLIP

## (undated) DEVICE — PK SET UP ANES OPN HEART 30

## (undated) DEVICE — BLAKE SILICONE DRAIN, 19 FR ROUND, HUBLESS WITH 1/4" BENDABLE TROCAR: Brand: BLAKE

## (undated) DEVICE — BLAKE SILICONE DRAINS CARDIO CONNECTOR 2:1: Brand: BLAKE